# Patient Record
Sex: MALE | Race: WHITE | Employment: OTHER | URBAN - METROPOLITAN AREA
[De-identification: names, ages, dates, MRNs, and addresses within clinical notes are randomized per-mention and may not be internally consistent; named-entity substitution may affect disease eponyms.]

---

## 2019-06-10 ENCOUNTER — HOSPITAL ENCOUNTER (OUTPATIENT)
Dept: LAB | Age: 69
Discharge: HOME | End: 2019-06-10

## 2019-06-10 PROCEDURE — 88305 TISSUE EXAM BY PATHOLOGIST: CPT

## 2020-01-28 ENCOUNTER — HOSPITAL ENCOUNTER (OUTPATIENT)
Dept: LAB | Age: 70
Discharge: HOME | End: 2020-01-28
Payer: MEDICARE

## 2020-01-28 DIAGNOSIS — R94.39 ABNORMAL CARDIOVASCULAR STRESS TEST: ICD-10-CM

## 2020-01-28 PROBLEM — I51.9 LV DYSFUNCTION: Status: ACTIVE | Noted: 2020-01-28

## 2020-01-28 PROBLEM — I44.7 LBBB (LEFT BUNDLE BRANCH BLOCK): Status: ACTIVE | Noted: 2020-01-28

## 2020-01-28 PROBLEM — I10 ESSENTIAL HYPERTENSION, BENIGN: Status: ACTIVE | Noted: 2020-01-28

## 2020-01-28 LAB
ANION GAP SERPL CALC-SCNC: 14 MMOL/L (ref 7–16)
BASOPHILS # BLD: 0.1 K/UL (ref 0–0.2)
BASOPHILS NFR BLD: 1 % (ref 0–2)
BUN SERPL-MCNC: 16 MG/DL (ref 8–23)
CALCIUM SERPL-MCNC: 9.9 MG/DL (ref 8.3–10.4)
CHLORIDE SERPL-SCNC: 106 MMOL/L (ref 98–107)
CO2 SERPL-SCNC: 21 MMOL/L (ref 21–32)
CREAT SERPL-MCNC: 1.37 MG/DL (ref 0.8–1.5)
DIFFERENTIAL METHOD BLD: NORMAL
EOSINOPHIL # BLD: 0.1 K/UL (ref 0–0.8)
EOSINOPHIL NFR BLD: 2 % (ref 0.5–7.8)
ERYTHROCYTE [DISTWIDTH] IN BLOOD BY AUTOMATED COUNT: 12.6 % (ref 11.9–14.6)
GLUCOSE SERPL-MCNC: 157 MG/DL (ref 65–100)
HCT VFR BLD AUTO: 44.8 % (ref 41.1–50.3)
HGB BLD-MCNC: 15.4 G/DL (ref 13.6–17.2)
IMM GRANULOCYTES # BLD AUTO: 0 K/UL (ref 0–0.5)
IMM GRANULOCYTES NFR BLD AUTO: 0 % (ref 0–5)
LYMPHOCYTES # BLD: 2.8 K/UL (ref 0.5–4.6)
LYMPHOCYTES NFR BLD: 41 % (ref 13–44)
MCH RBC QN AUTO: 30.9 PG (ref 26.1–32.9)
MCHC RBC AUTO-ENTMCNC: 34.4 G/DL (ref 31.4–35)
MCV RBC AUTO: 89.8 FL (ref 79.6–97.8)
MONOCYTES # BLD: 0.6 K/UL (ref 0.1–1.3)
MONOCYTES NFR BLD: 9 % (ref 4–12)
NEUTS SEG # BLD: 3.2 K/UL (ref 1.7–8.2)
NEUTS SEG NFR BLD: 47 % (ref 43–78)
NRBC # BLD: 0 K/UL (ref 0–0.2)
PLATELET # BLD AUTO: 212 K/UL (ref 150–450)
PMV BLD AUTO: 9.7 FL (ref 9.4–12.3)
POTASSIUM SERPL-SCNC: 4 MMOL/L (ref 3.5–5.1)
RBC # BLD AUTO: 4.99 M/UL (ref 4.23–5.6)
SODIUM SERPL-SCNC: 141 MMOL/L (ref 136–145)
WBC # BLD AUTO: 6.7 K/UL (ref 4.3–11.1)

## 2020-01-28 PROCEDURE — 36415 COLL VENOUS BLD VENIPUNCTURE: CPT

## 2020-01-28 PROCEDURE — 85025 COMPLETE CBC W/AUTO DIFF WBC: CPT

## 2020-01-28 PROCEDURE — 80048 BASIC METABOLIC PNL TOTAL CA: CPT

## 2020-01-31 ENCOUNTER — HOSPITAL ENCOUNTER (OUTPATIENT)
Dept: CARDIAC CATH/INVASIVE PROCEDURES | Age: 70
Discharge: HOME | End: 2020-01-31
Attending: INTERNAL MEDICINE | Admitting: INTERNAL MEDICINE
Payer: MEDICARE

## 2020-01-31 VITALS
HEART RATE: 75 BPM | BODY MASS INDEX: 32.21 KG/M2 | RESPIRATION RATE: 14 BRPM | HEIGHT: 70 IN | OXYGEN SATURATION: 95 % | DIASTOLIC BLOOD PRESSURE: 78 MMHG | SYSTOLIC BLOOD PRESSURE: 130 MMHG | TEMPERATURE: 98.1 F | WEIGHT: 225 LBS

## 2020-01-31 LAB
ALBUMIN SERPL-MCNC: 3.8 G/DL (ref 3.2–4.6)
ALBUMIN/GLOB SERPL: 1.2 {RATIO} (ref 1.2–3.5)
ALP SERPL-CCNC: 68 U/L (ref 50–136)
ALT SERPL-CCNC: 46 U/L (ref 12–65)
ANION GAP SERPL CALC-SCNC: 6 MMOL/L (ref 7–16)
AST SERPL-CCNC: 27 U/L (ref 15–37)
BILIRUB SERPL-MCNC: 0.8 MG/DL (ref 0.2–1.1)
BUN SERPL-MCNC: 13 MG/DL (ref 8–23)
CALCIUM SERPL-MCNC: 8.9 MG/DL (ref 8.3–10.4)
CHLORIDE SERPL-SCNC: 106 MMOL/L (ref 98–107)
CO2 SERPL-SCNC: 30 MMOL/L (ref 21–32)
CREAT SERPL-MCNC: 1.44 MG/DL (ref 0.8–1.5)
GLOBULIN SER CALC-MCNC: 3.3 G/DL (ref 2.3–3.5)
GLUCOSE SERPL-MCNC: 130 MG/DL (ref 65–100)
POTASSIUM SERPL-SCNC: 3.9 MMOL/L (ref 3.5–5.1)
PROT SERPL-MCNC: 7.1 G/DL (ref 6.3–8.2)
SODIUM SERPL-SCNC: 142 MMOL/L (ref 136–145)

## 2020-01-31 PROCEDURE — 80053 COMPREHEN METABOLIC PANEL: CPT

## 2020-01-31 PROCEDURE — 74011000250 HC RX REV CODE- 250: Performed by: INTERNAL MEDICINE

## 2020-01-31 PROCEDURE — 74011250637 HC RX REV CODE- 250/637: Performed by: INTERNAL MEDICINE

## 2020-01-31 PROCEDURE — C1894 INTRO/SHEATH, NON-LASER: HCPCS

## 2020-01-31 PROCEDURE — C1769 GUIDE WIRE: HCPCS

## 2020-01-31 PROCEDURE — 74011636320 HC RX REV CODE- 636/320: Performed by: INTERNAL MEDICINE

## 2020-01-31 PROCEDURE — 93458 L HRT ARTERY/VENTRICLE ANGIO: CPT

## 2020-01-31 PROCEDURE — 77030004559 HC CATH ANGI DX SUPT CARD -B

## 2020-01-31 PROCEDURE — 74011250636 HC RX REV CODE- 250/636: Performed by: INTERNAL MEDICINE

## 2020-01-31 PROCEDURE — 99152 MOD SED SAME PHYS/QHP 5/>YRS: CPT

## 2020-01-31 PROCEDURE — 77030029997 HC DEV COM RDL R BND TELE -B

## 2020-01-31 RX ORDER — GUAIFENESIN 100 MG/5ML
81-324 LIQUID (ML) ORAL ONCE
Status: COMPLETED | OUTPATIENT
Start: 2020-01-31 | End: 2020-01-31

## 2020-01-31 RX ORDER — MIDAZOLAM HYDROCHLORIDE 1 MG/ML
.5-2 INJECTION, SOLUTION INTRAMUSCULAR; INTRAVENOUS
Status: DISCONTINUED | OUTPATIENT
Start: 2020-01-31 | End: 2020-01-31 | Stop reason: HOSPADM

## 2020-01-31 RX ORDER — HEPARIN SODIUM 200 [USP'U]/100ML
3 INJECTION, SOLUTION INTRAVENOUS CONTINUOUS
Status: DISCONTINUED | OUTPATIENT
Start: 2020-01-31 | End: 2020-01-31 | Stop reason: HOSPADM

## 2020-01-31 RX ORDER — SODIUM CHLORIDE 9 MG/ML
75 INJECTION, SOLUTION INTRAVENOUS CONTINUOUS
Status: DISCONTINUED | OUTPATIENT
Start: 2020-01-31 | End: 2020-01-31 | Stop reason: HOSPADM

## 2020-01-31 RX ORDER — LIDOCAINE HYDROCHLORIDE 10 MG/ML
2-20 INJECTION, SOLUTION EPIDURAL; INFILTRATION; INTRACAUDAL; PERINEURAL ONCE
Status: COMPLETED | OUTPATIENT
Start: 2020-01-31 | End: 2020-01-31

## 2020-01-31 RX ADMIN — ASPIRIN 81 MG 324 MG: 81 TABLET ORAL at 10:02

## 2020-01-31 RX ADMIN — SODIUM CHLORIDE 75 ML/HR: 900 INJECTION, SOLUTION INTRAVENOUS at 10:03

## 2020-01-31 RX ADMIN — MIDAZOLAM 2 MG: 1 INJECTION INTRAMUSCULAR; INTRAVENOUS at 13:25

## 2020-01-31 RX ADMIN — IOPAMIDOL 80 ML: 755 INJECTION, SOLUTION INTRAVENOUS at 13:36

## 2020-01-31 RX ADMIN — HEPARIN SODIUM 3 ML/HR: 5000 INJECTION, SOLUTION INTRAVENOUS; SUBCUTANEOUS at 13:25

## 2020-01-31 RX ADMIN — HEPARIN SODIUM 2.3 ML: 10000 INJECTION, SOLUTION INTRAVENOUS; SUBCUTANEOUS at 13:25

## 2020-01-31 RX ADMIN — LIDOCAINE HYDROCHLORIDE 3 ML: 10 INJECTION, SOLUTION EPIDURAL; INFILTRATION; INTRACAUDAL; PERINEURAL at 13:25

## 2020-01-31 NOTE — PROGRESS NOTES
Patient pre-assessment complete for [facility] poss with Dr Shaina Rodriguez scheduled for 20 at 11am, arrival time 9am. Patient verified using . Patient instructed to bring all home medications in labeled bottles on the day of procedure. NPO status reinforced. Patient informed to take a full dose aspirin 325mg  or 81 mg x 4 on the day of procedure. Instructed they can take all other medications excluding vitamins & supplements. Patient verbalizes understanding of all instructions & denies any questions at this time.

## 2020-01-31 NOTE — PROGRESS NOTES
Pt arrived, ambulated to room with no visible problems, planned C for Dr Elmira Degroot. Consent signed, Procedure discussed with pt all questions answered voiced understanding. Medications and history discussed with pt. Pt prepped per ordersThe patient has a fraility score of 3-MANAGING WELL, based on ability to complete ADLs without assistance.

## 2020-01-31 NOTE — DISCHARGE INSTRUCTIONS
HEART CATHETERIZATION/ANGIOGRAPHY DISCHARGE INSTRUCTIONS    1. Check puncture site frequently for swelling or bleeding. If there is any bleeding, apply pressure over the area with a clean towel or washcloth. If you are unable to stop the bleeding in 15-20 minutes call 911. Notify your doctor for any redness, swelling, drainage, or oozing from the puncture site. Notify your doctor for any fever, chills or other signs of infection. 2. If the extremity becomes cold, numb, or painful call Dr. Salamanca [address] [facility] [address] Cardiology at [phone].  3. Activity should be very limited for the next 24 hours. Avoid pushing, pulling, or strenuous activity for 3 days No heavy lifting (anything over 5 pounds) for 3 days. No driving for 48 hours. 4. You may resume your usual diet. Drink more fluids than usual, water is best.  5. Have a responsible person drive you home and stay with you for at least 24 hours after your heart catheterization/angiography. 6. You may remove bandage from your right wrist in 24 hours. You may shower in 24 hours. No tub baths, hot tubs, or swimming for 1 week. Do not wash dishes for 1 week. Do not place any lotions, creams, powders, or ointments over puncture site for 1 week. You may place a clean band-aid over the puncture site each day for 5 days. Change daily. I have read the above instructions and have had the opportunity to ask questions.

## 2020-01-31 NOTE — PROCEDURES
Brief Cardiac Procedure Note    Patient: Arpit Records MRN: [id number]  SSN: [id number]    YOB: 1950  Age: 77 y.o. Sex: male      Date of Procedure: 1/31/2020     Pre-procedure Diagnosis: Congestive Heart Failure and Coronary Artery Disease    Post-procedure Diagnosis: Coronary Artery Disease    Reason for Procedure: Cardiomyopathy    Procedure: Left Heart Catheterization    Brief Description of Procedure: rra    Performed By: Jaz Domínguez MD     Assistants:     Anesthesia: Moderate Sedation    Estimated Blood Loss: Less than 10 mL      Specimens: None    Implants: None    Findings:   Ef 50  edp 12  Lm ok  + non obst cad lad, lcx and rca    Complications: None    Recommendations: Continue medical therapy.     Signed By: Jaz Domínguez MD     January 31, 2020

## 2020-02-01 NOTE — PROCEDURES
[facility]  CARDIAC CATH    Name:  Gertrudis Trevino  MR#:  [id number]  :  1950  ACCOUNT #:  [de-identified]  DATE OF SERVICE:  2020    PROCEDURES PERFORMED:  Cardiac catheterization. PREOPERATIVE DIAGNOSES:  cardiomyopathy. POSTOPERATIVE DIAGNOSES:  same. SURGEON:  Omar Dia MD    ASSISTANT:  YOLANDA Stearns    ESTIMATED BLOOD LOSS:  Zero. SPECIMENS REMOVED:  Zero. COMPLICATIONS:  Zero. IMPLANTS:  Zero. ANESTHESIA:  Conscious sedation, Versed 2 mg administered by Alexa Sinha RN. The procedure start time was 01:26 and end time was 01:41. HISTORY:  This is a 61-year-old gentleman with multiple risk factors of coronary artery disease. He has a left bundle-branch block. He has a mild left ventricular dysfunction. Cardiac catheterization to exclude coronary disease is recommended. PROCEDURE:  Left heart catheterization, left ventriculography, coronary angiography was carried out from right radial artery by modified Seldinger technique with a 6-Slov[place] multipurpose catheter. He tolerated well. No intervention was performed. FINDINGS:  The central aortic pressure is 140/70 mmHg. Left ventricular end-diastolic pressure is 12 mmHg. There is no gradient on pullback across the aortic valve. The overall left ventricular size is normal.  There is mild generalized left ventricular hypokinesis. Left ventricular ejection fraction on the order of 50%. Coronary angiography reveals the right coronary artery to be normal in its origin. There is mild-to-moderate smooth atherosclerotic disease in the midportion. The greatest stenosis in this region on the order of 40%. The middle and distal portion of the right coronary artery shows some calcification and minor irregularity. The left main is normal.  Divides into LAD and left circumflex. LAD has calcification.   In the midportion of the vessel, there is plaque formation with the greatest stenosis in this region on the order of 40-50%. The further middle and distal LAD segments are normal.  The LAD diagonal is a big vessel with minor disease. The left circumflex is a large artery. It has mild atherosclerotic disease present. IMPRESSION:  1. Mild left ventricular systolic dysfunction. 2.  Nonobstructive coronary artery disease as described. There is mild-to-moderate nonobstructive disease of the left anterior descending, left circumflex, and right coronary arteries. RECOMMENDATIONS:  Risk factor modification and medical therapy.       Philip Rodríguez MD      [id number]  D:  01/31/2020 13:52  T:  02/01/2020 1:41  JOB #:  [id number]

## 2020-02-17 PROBLEM — I25.10 ATHEROSCLEROSIS OF NATIVE CORONARY ARTERY OF NATIVE HEART WITHOUT ANGINA PECTORIS: Status: ACTIVE | Noted: 2020-02-17

## 2021-07-16 ENCOUNTER — TRANSCRIBE ORDER (OUTPATIENT)
Dept: SCHEDULING | Age: 71
End: 2021-07-16

## 2021-07-16 DIAGNOSIS — Z13.6 SCREENING FOR AAA (ABDOMINAL AORTIC ANEURYSM): Primary | ICD-10-CM

## 2021-07-24 ENCOUNTER — HOSPITAL ENCOUNTER (OUTPATIENT)
Dept: ULTRASOUND IMAGING | Age: 71
Discharge: HOME | End: 2021-07-24
Attending: FAMILY MEDICINE
Payer: MEDICARE

## 2021-07-24 DIAGNOSIS — Z13.6 SCREENING FOR AAA (ABDOMINAL AORTIC ANEURYSM): ICD-10-CM

## 2021-07-24 PROCEDURE — 76706 US ABDL AORTA SCREEN AAA: CPT

## 2021-08-11 PROBLEM — I48.92 ATRIAL FLUTTER (HCC): Status: ACTIVE | Noted: 2021-08-11

## 2021-10-07 ENCOUNTER — HOSPITAL ENCOUNTER (OUTPATIENT)
Dept: LAB | Age: 71
Discharge: HOME | End: 2021-10-07
Payer: MEDICARE

## 2021-10-07 DIAGNOSIS — I44.7 LBBB (LEFT BUNDLE BRANCH BLOCK): ICD-10-CM

## 2021-10-07 DIAGNOSIS — I48.3 TYPICAL ATRIAL FLUTTER (HCC): ICD-10-CM

## 2021-10-07 DIAGNOSIS — I10 ESSENTIAL HYPERTENSION, BENIGN: ICD-10-CM

## 2021-10-07 LAB
ANION GAP SERPL CALC-SCNC: 7 MMOL/L (ref 7–16)
BASOPHILS # BLD: 0.1 K/UL (ref 0–0.2)
BASOPHILS NFR BLD: 1 % (ref 0–2)
BUN SERPL-MCNC: 17 MG/DL (ref 8–23)
CALCIUM SERPL-MCNC: 9.6 MG/DL (ref 8.3–10.4)
CHLORIDE SERPL-SCNC: 107 MMOL/L (ref 98–107)
CO2 SERPL-SCNC: 28 MMOL/L (ref 21–32)
CREAT SERPL-MCNC: 1.61 MG/DL (ref 0.8–1.5)
DIFFERENTIAL METHOD BLD: NORMAL
EOSINOPHIL # BLD: 0.1 K/UL (ref 0–0.8)
EOSINOPHIL NFR BLD: 2 % (ref 0.5–7.8)
ERYTHROCYTE [DISTWIDTH] IN BLOOD BY AUTOMATED COUNT: 13.2 % (ref 11.9–14.6)
GLUCOSE SERPL-MCNC: 156 MG/DL (ref 65–100)
HCT VFR BLD AUTO: 43.7 % (ref 41.1–50.3)
HGB BLD-MCNC: 14.4 G/DL (ref 13.6–17.2)
IMM GRANULOCYTES # BLD AUTO: 0 K/UL (ref 0–0.5)
IMM GRANULOCYTES NFR BLD AUTO: 0 % (ref 0–5)
LYMPHOCYTES # BLD: 1.9 K/UL (ref 0.5–4.6)
LYMPHOCYTES NFR BLD: 34 % (ref 13–44)
MAGNESIUM SERPL-MCNC: 2 MG/DL (ref 1.8–2.4)
MCH RBC QN AUTO: 29.1 PG (ref 26.1–32.9)
MCHC RBC AUTO-ENTMCNC: 33 G/DL (ref 31.4–35)
MCV RBC AUTO: 88.3 FL (ref 79.6–97.8)
MONOCYTES # BLD: 0.4 K/UL (ref 0.1–1.3)
MONOCYTES NFR BLD: 7 % (ref 4–12)
NEUTS SEG # BLD: 3.1 K/UL (ref 1.7–8.2)
NEUTS SEG NFR BLD: 56 % (ref 43–78)
NRBC # BLD: 0 K/UL (ref 0–0.2)
PLATELET # BLD AUTO: 245 K/UL (ref 150–450)
PMV BLD AUTO: 9.9 FL (ref 9.4–12.3)
POTASSIUM SERPL-SCNC: 4.1 MMOL/L (ref 3.5–5.1)
RBC # BLD AUTO: 4.95 M/UL (ref 4.23–5.6)
SODIUM SERPL-SCNC: 142 MMOL/L (ref 136–145)
WBC # BLD AUTO: 5.5 K/UL (ref 4.3–11.1)

## 2021-10-07 PROCEDURE — 80048 BASIC METABOLIC PNL TOTAL CA: CPT

## 2021-10-07 PROCEDURE — 83735 ASSAY OF MAGNESIUM: CPT

## 2021-10-07 PROCEDURE — 36415 COLL VENOUS BLD VENIPUNCTURE: CPT

## 2021-10-07 PROCEDURE — 85025 COMPLETE CBC W/AUTO DIFF WBC: CPT

## 2021-10-08 NOTE — PROGRESS NOTES
Patient pre-assessment complete for Atrial flutter ablation with Dr Jeffrey Lopez scheduled for 10/11/21 at 9:30am, arrival time 7:30am. Patient verified using . Patient instructed to bring all home medications in labeled bottles on the day of procedure. NPO status reinforced. Patient instructed to HOLD eliquis starting  & HOLD lisinopril & metformin the day of procedure. Instructed they can take all other medications excluding vitamins & supplements. Patient verbalizes understanding of all instructions & denies any questions at this time.

## 2021-10-10 ENCOUNTER — ANESTHESIA EVENT (OUTPATIENT)
Dept: CARDIAC CATH/INVASIVE PROCEDURES | Age: 71
End: 2021-10-10
Payer: MEDICARE

## 2021-10-11 ENCOUNTER — HOSPITAL ENCOUNTER (OUTPATIENT)
Age: 71
Setting detail: OUTPATIENT SURGERY
Discharge: HOME | End: 2021-10-11
Attending: INTERNAL MEDICINE | Admitting: INTERNAL MEDICINE
Payer: MEDICARE

## 2021-10-11 ENCOUNTER — ANESTHESIA (OUTPATIENT)
Dept: CARDIAC CATH/INVASIVE PROCEDURES | Age: 71
End: 2021-10-11
Payer: MEDICARE

## 2021-10-11 ENCOUNTER — APPOINTMENT (OUTPATIENT)
Dept: CARDIAC CATH/INVASIVE PROCEDURES | Age: 71
End: 2021-10-11
Attending: INTERNAL MEDICINE
Payer: MEDICARE

## 2021-10-11 VITALS
OXYGEN SATURATION: 91 % | HEART RATE: 101 BPM | SYSTOLIC BLOOD PRESSURE: 127 MMHG | BODY MASS INDEX: 31.21 KG/M2 | TEMPERATURE: 98 F | RESPIRATION RATE: 16 BRPM | WEIGHT: 218 LBS | DIASTOLIC BLOOD PRESSURE: 88 MMHG | HEIGHT: 70 IN

## 2021-10-11 DIAGNOSIS — I48.3 TYPICAL ATRIAL FLUTTER (HCC): ICD-10-CM

## 2021-10-11 DIAGNOSIS — I48.19 PERSISTENT ATRIAL FIBRILLATION (HCC): ICD-10-CM

## 2021-10-11 LAB
ALBUMIN SERPL-MCNC: 4 G/DL (ref 3.2–4.6)
ALBUMIN/GLOB SERPL: 1.2 {RATIO} (ref 1.2–3.5)
ALP SERPL-CCNC: 42 U/L (ref 50–136)
ALT SERPL-CCNC: 29 U/L (ref 12–65)
ANION GAP SERPL CALC-SCNC: 5 MMOL/L (ref 7–16)
AST SERPL-CCNC: 19 U/L (ref 15–37)
ATRIAL RATE: 113 BPM
BILIRUB SERPL-MCNC: 0.6 MG/DL (ref 0.2–1.1)
BUN SERPL-MCNC: 14 MG/DL (ref 8–23)
CALCIUM SERPL-MCNC: 9 MG/DL (ref 8.3–10.4)
CALCULATED R AXIS, ECG10: -50 DEGREES
CALCULATED T AXIS, ECG11: 96 DEGREES
CHLORIDE SERPL-SCNC: 108 MMOL/L (ref 98–107)
CO2 SERPL-SCNC: 25 MMOL/L (ref 21–32)
CREAT SERPL-MCNC: 1.46 MG/DL (ref 0.8–1.5)
DIAGNOSIS, 93000: NORMAL
ERYTHROCYTE [DISTWIDTH] IN BLOOD BY AUTOMATED COUNT: 13.2 % (ref 11.9–14.6)
GLOBULIN SER CALC-MCNC: 3.3 G/DL (ref 2.3–3.5)
GLUCOSE SERPL-MCNC: 146 MG/DL (ref 65–100)
HCT VFR BLD AUTO: 43.2 % (ref 41.1–50.3)
HGB BLD-MCNC: 13.9 G/DL (ref 13.6–17.2)
MAGNESIUM SERPL-MCNC: 1.9 MG/DL (ref 1.8–2.4)
MCH RBC QN AUTO: 28.8 PG (ref 26.1–32.9)
MCHC RBC AUTO-ENTMCNC: 32.2 G/DL (ref 31.4–35)
MCV RBC AUTO: 89.6 FL (ref 79.6–97.8)
NRBC # BLD: 0 K/UL (ref 0–0.2)
PLATELET # BLD AUTO: 244 K/UL (ref 150–450)
PMV BLD AUTO: 9.7 FL (ref 9.4–12.3)
POTASSIUM SERPL-SCNC: 3.8 MMOL/L (ref 3.5–5.1)
PROT SERPL-MCNC: 7.3 G/DL (ref 6.3–8.2)
Q-T INTERVAL, ECG07: 324 MS
QRS DURATION, ECG06: 144 MS
QTC CALCULATION (BEZET), ECG08: 463 MS
RBC # BLD AUTO: 4.82 M/UL (ref 4.23–5.6)
SODIUM SERPL-SCNC: 138 MMOL/L (ref 138–145)
VENTRICULAR RATE, ECG03: 123 BPM
WBC # BLD AUTO: 6.2 K/UL (ref 4.3–11.1)

## 2021-10-11 PROCEDURE — 93312 ECHO TRANSESOPHAGEAL: CPT | Performed by: INTERNAL MEDICINE

## 2021-10-11 PROCEDURE — 93613 INTRACARDIAC EPHYS 3D MAPG: CPT | Performed by: INTERNAL MEDICINE

## 2021-10-11 PROCEDURE — 92960 CARDIOVERSION ELECTRIC EXT: CPT | Performed by: INTERNAL MEDICINE

## 2021-10-11 PROCEDURE — 93653 COMPRE EP EVAL TX SVT: CPT | Performed by: INTERNAL MEDICINE

## 2021-10-11 PROCEDURE — 76060000033 HC ANESTHESIA 1 TO 1.5 HR: Performed by: INTERNAL MEDICINE

## 2021-10-11 PROCEDURE — C1760 CLOSURE DEV, VASC: HCPCS | Performed by: INTERNAL MEDICINE

## 2021-10-11 PROCEDURE — 74011250636 HC RX REV CODE- 250/636: Performed by: NURSE ANESTHETIST, CERTIFIED REGISTERED

## 2021-10-11 PROCEDURE — 93325 DOPPLER ECHO COLOR FLOW MAPG: CPT | Performed by: INTERNAL MEDICINE

## 2021-10-11 PROCEDURE — C1730 CATH, EP, 19 OR FEW ELECT: HCPCS | Performed by: INTERNAL MEDICINE

## 2021-10-11 PROCEDURE — 77030027107 HC PTCH EXT REF CARTO3 J&J -F: Performed by: INTERNAL MEDICINE

## 2021-10-11 PROCEDURE — 93621 COMP EP EVL L PAC&REC C SINS: CPT | Performed by: INTERNAL MEDICINE

## 2021-10-11 PROCEDURE — C1732 CATH, EP, DIAG/ABL, 3D/VECT: HCPCS | Performed by: INTERNAL MEDICINE

## 2021-10-11 PROCEDURE — 93320 DOPPLER ECHO COMPLETE: CPT | Performed by: INTERNAL MEDICINE

## 2021-10-11 PROCEDURE — 77030039425 HC BLD LARYNG TRULITE DISP TELE -A: Performed by: NURSE ANESTHETIST, CERTIFIED REGISTERED

## 2021-10-11 PROCEDURE — 80053 COMPREHEN METABOLIC PANEL: CPT

## 2021-10-11 PROCEDURE — 76210000006 HC OR PH I REC 0.5 TO 1 HR: Performed by: INTERNAL MEDICINE

## 2021-10-11 PROCEDURE — 74011000250 HC RX REV CODE- 250: Performed by: NURSE ANESTHETIST, CERTIFIED REGISTERED

## 2021-10-11 PROCEDURE — 93005 ELECTROCARDIOGRAM TRACING: CPT | Performed by: INTERNAL MEDICINE

## 2021-10-11 PROCEDURE — 77030037088 HC TUBE ENDOTRACH ORAL NSL COVD-A: Performed by: NURSE ANESTHETIST, CERTIFIED REGISTERED

## 2021-10-11 PROCEDURE — 83735 ASSAY OF MAGNESIUM: CPT

## 2021-10-11 PROCEDURE — 93325 DOPPLER ECHO COLOR FLOW MAPG: CPT

## 2021-10-11 PROCEDURE — 85027 COMPLETE CBC AUTOMATED: CPT

## 2021-10-11 PROCEDURE — C1894 INTRO/SHEATH, NON-LASER: HCPCS | Performed by: INTERNAL MEDICINE

## 2021-10-11 PROCEDURE — 77030013687 HC GD NDL BARD -B: Performed by: INTERNAL MEDICINE

## 2021-10-11 PROCEDURE — 77030035291 HC TBNG PMP SMARTABLATE J&J -B: Performed by: INTERNAL MEDICINE

## 2021-10-11 RX ORDER — SODIUM CHLORIDE 0.9 % (FLUSH) 0.9 %
5-40 SYRINGE (ML) INJECTION AS NEEDED
Status: DISCONTINUED | OUTPATIENT
Start: 2021-10-11 | End: 2021-10-11 | Stop reason: HOSPADM

## 2021-10-11 RX ORDER — PROPOFOL 10 MG/ML
INJECTION, EMULSION INTRAVENOUS AS NEEDED
Status: DISCONTINUED | OUTPATIENT
Start: 2021-10-11 | End: 2021-10-11 | Stop reason: HOSPADM

## 2021-10-11 RX ORDER — PROPOFOL 10 MG/ML
INJECTION, EMULSION INTRAVENOUS
Status: DISCONTINUED | OUTPATIENT
Start: 2021-10-11 | End: 2021-10-11 | Stop reason: HOSPADM

## 2021-10-11 RX ORDER — LIDOCAINE HYDROCHLORIDE 20 MG/ML
INJECTION, SOLUTION EPIDURAL; INFILTRATION; INTRACAUDAL; PERINEURAL AS NEEDED
Status: DISCONTINUED | OUTPATIENT
Start: 2021-10-11 | End: 2021-10-11 | Stop reason: HOSPADM

## 2021-10-11 RX ORDER — DILTIAZEM HYDROCHLORIDE 120 MG/1
120 CAPSULE, COATED, EXTENDED RELEASE ORAL EVERY 12 HOURS
Qty: 60 CAPSULE | Refills: 3 | Status: SHIPPED | OUTPATIENT
Start: 2021-10-11 | End: 2021-12-08 | Stop reason: SDUPTHER

## 2021-10-11 RX ORDER — NALOXONE HYDROCHLORIDE 0.4 MG/ML
0.1 INJECTION, SOLUTION INTRAMUSCULAR; INTRAVENOUS; SUBCUTANEOUS AS NEEDED
Status: DISCONTINUED | OUTPATIENT
Start: 2021-10-11 | End: 2021-10-11 | Stop reason: HOSPADM

## 2021-10-11 RX ORDER — ROCURONIUM BROMIDE 10 MG/ML
INJECTION, SOLUTION INTRAVENOUS AS NEEDED
Status: DISCONTINUED | OUTPATIENT
Start: 2021-10-11 | End: 2021-10-11 | Stop reason: HOSPADM

## 2021-10-11 RX ORDER — HYDROCODONE BITARTRATE AND ACETAMINOPHEN 7.5; 325 MG/1; MG/1
1 TABLET ORAL AS NEEDED
Status: DISCONTINUED | OUTPATIENT
Start: 2021-10-11 | End: 2021-10-11 | Stop reason: HOSPADM

## 2021-10-11 RX ORDER — SODIUM CHLORIDE, SODIUM LACTATE, POTASSIUM CHLORIDE, CALCIUM CHLORIDE 600; 310; 30; 20 MG/100ML; MG/100ML; MG/100ML; MG/100ML
INJECTION, SOLUTION INTRAVENOUS
Status: DISCONTINUED | OUTPATIENT
Start: 2021-10-11 | End: 2021-10-11 | Stop reason: HOSPADM

## 2021-10-11 RX ORDER — OXYCODONE HYDROCHLORIDE 5 MG/1
5 TABLET ORAL
Status: DISCONTINUED | OUTPATIENT
Start: 2021-10-11 | End: 2021-10-11 | Stop reason: HOSPADM

## 2021-10-11 RX ORDER — SUCCINYLCHOLINE CHLORIDE 20 MG/ML
INJECTION INTRAMUSCULAR; INTRAVENOUS AS NEEDED
Status: DISCONTINUED | OUTPATIENT
Start: 2021-10-11 | End: 2021-10-11 | Stop reason: HOSPADM

## 2021-10-11 RX ORDER — SODIUM CHLORIDE 0.9 % (FLUSH) 0.9 %
5-40 SYRINGE (ML) INJECTION EVERY 8 HOURS
Status: DISCONTINUED | OUTPATIENT
Start: 2021-10-11 | End: 2021-10-11 | Stop reason: HOSPADM

## 2021-10-11 RX ORDER — HYDROMORPHONE HYDROCHLORIDE 2 MG/ML
0.5 INJECTION, SOLUTION INTRAMUSCULAR; INTRAVENOUS; SUBCUTANEOUS
Status: DISCONTINUED | OUTPATIENT
Start: 2021-10-11 | End: 2021-10-11 | Stop reason: HOSPADM

## 2021-10-11 RX ORDER — EPHEDRINE SULFATE/0.9% NACL/PF 50 MG/5 ML
SYRINGE (ML) INTRAVENOUS AS NEEDED
Status: DISCONTINUED | OUTPATIENT
Start: 2021-10-11 | End: 2021-10-11 | Stop reason: HOSPADM

## 2021-10-11 RX ADMIN — Medication 10 MG: at 10:38

## 2021-10-11 RX ADMIN — LIDOCAINE HYDROCHLORIDE 40 MG: 20 INJECTION, SOLUTION EPIDURAL; INFILTRATION; INTRACAUDAL; PERINEURAL at 09:53

## 2021-10-11 RX ADMIN — PHENYLEPHRINE HYDROCHLORIDE 100 MCG: 10 INJECTION INTRAVENOUS at 10:33

## 2021-10-11 RX ADMIN — SUCCINYLCHOLINE CHLORIDE 140 MG: 20 INJECTION, SOLUTION INTRAMUSCULAR; INTRAVENOUS at 10:29

## 2021-10-11 RX ADMIN — ROCURONIUM BROMIDE 30 MG: 10 INJECTION, SOLUTION INTRAVENOUS at 10:34

## 2021-10-11 RX ADMIN — PROPOFOL 30 MG: 10 INJECTION, EMULSION INTRAVENOUS at 09:54

## 2021-10-11 RX ADMIN — PROPOFOL 100 MCG/KG/MIN: 10 INJECTION, EMULSION INTRAVENOUS at 09:57

## 2021-10-11 RX ADMIN — PROPOFOL 30 MG: 10 INJECTION, EMULSION INTRAVENOUS at 10:17

## 2021-10-11 RX ADMIN — SUGAMMADEX 200 MG: 100 INJECTION, SOLUTION INTRAVENOUS at 10:47

## 2021-10-11 RX ADMIN — PROPOFOL 50 MG: 10 INJECTION, EMULSION INTRAVENOUS at 10:29

## 2021-10-11 RX ADMIN — PHENYLEPHRINE HYDROCHLORIDE 200 MCG: 10 INJECTION INTRAVENOUS at 10:37

## 2021-10-11 RX ADMIN — PHENYLEPHRINE HYDROCHLORIDE 100 MCG: 10 INJECTION INTRAVENOUS at 10:19

## 2021-10-11 RX ADMIN — SODIUM CHLORIDE, SODIUM LACTATE, POTASSIUM CHLORIDE, AND CALCIUM CHLORIDE: 600; 310; 30; 20 INJECTION, SOLUTION INTRAVENOUS at 09:46

## 2021-10-11 NOTE — Clinical Note
Bilateral groin clipped prepped with ChloraPrep and draped. Wet prep solution applied at: 952. Wet prep solution dried at: 957. Wet prep elapsed drying time: 5 mins.

## 2021-10-11 NOTE — DISCHARGE INSTRUCTIONS
Ablation Discharge Instructions    *Check the puncture site frequently for swelling or bleeding. If you see any bleeding, lie down and apply pressure over the area with a clean town or washcloth. Notify your doctor for any redness, swelling, drainage or oozing from the puncture site. Notify your doctor for any fever or chills. *If the leg with the puncture becomes cold, numb or painful, call [facility]iology at  [phone]. *Activity should be limited for the next 48 hours. Climb stairs as little as possible and avoid any stooping, bending or strenuous activity for 48 hours. No heavy lifting (anything over 10 pounds) for three days. *Do not drive for 48 hours. *You may resume your usual diet. Drink more fluids than usual.    *Have a responsible person drive you home and stay with you for at least 24 hours after your ablation. *You may remove the bandage from your Right Groin in 24 hours. You may shower in 24 hours. No tub baths, hot tubs or swimming for one week. Do not place any lotions, creams, powders, ointments over the puncture site for one week. You may place a clean band-aid over the puncture site each day for 5 days. Change this daily.

## 2021-10-11 NOTE — ANESTHESIA POSTPROCEDURE EVALUATION
Procedure(s):  ABLATION A-FLUTTER.    total IV anesthesia    Anesthesia Post Evaluation      Multimodal analgesia: multimodal analgesia used between 6 hours prior to anesthesia start to PACU discharge  Patient location during evaluation: PACU  Patient participation: complete - patient participated  Level of consciousness: awake and alert  Pain management: adequate  Airway patency: patent  Anesthetic complications: no  Cardiovascular status: acceptable  Respiratory status: acceptable  Hydration status: acceptable  Post anesthesia nausea and vomiting:  none  Final Post Anesthesia Temperature Assessment:  Normothermia (36.0-37.5 degrees C)      INITIAL Post-op Vital signs:   Vitals Value Taken Time   /73 10/11/21 1146   Temp 36.7 °C (98 °F) 10/11/21 1146   Pulse 105 10/11/21 1146   Resp 16 10/11/21 1146   SpO2 96 % 10/11/21 1146

## 2021-10-11 NOTE — H&P
[facility] Cardiology/Electrophyiology Consult                Date of  Admission: 10/11/2021  7:15 AM     CC/Reason for admission: aflutter ablation     Cyn Hoskins is a 80 y.o. male admitted for Typical atrial flutter ([facility] [address] [I48.3]. 80 y.o. male with a past medical and cardiac history significant for HTN, LBBB, CAD and new diagnosis of atrial flutter admitted for scheduled aflutter ablation. PT was seen recently by Dr. Christina Sanchez and was found incidentally to have rapid HR, atrial flutter. PT has been having some fatigue, no chest pain, palpitations, presyncope or syncope. He was feeling significant SOB, DUNLAP, now improved no meds. Cardiac PMH: (Old records have been reviewed and summarized below)  Cath (1/31/20): IMPRESSION:  1.  Mild left ventricular systolic dysfunction.   2.  Nonobstructive coronary artery disease as described. Xochitl Norris is mild-to-moderate nonobstructive disease of the left anterior descending, left circumflex, and right coronary arteries.     TTE (1/23/20): EF 50%    Patient Active Problem List   Diagnosis Code    Essential hypertension, benign I10    LBBB (left bundle branch block) I44.7    LV dysfunction I51.9    Abnormal cardiovascular stress test R94.39    Atherosclerosis of native coronary artery of native heart without angina pectoris I25.10    Atrial flutter ([facility] [address] I48.92       Past Medical History:   Diagnosis Date    Atherosclerosis of native coronary artery of native heart without angina pectoris 2/17/2020    GERD (gastroesophageal reflux disease)     High cholesterol     Hypertension     LBBB (left bundle branch block) 1/28/2020      Past Surgical History:   Procedure Laterality Date    HX HEENT      left tube placement    HX ORTHOPAEDIC      L5 disc surgery    HX OTHER SURGICAL      skin grafts on right hip and thigh     No Known Allergies   Family History   Problem Relation Age of Onset    Malignant Hyperthermia Neg Hx     Pseudocholinesterase Deficiency Neg Hx  Delayed Awakening Neg Hx     Post-op Nausea/Vomiting Neg Hx     Emergence Delirium Neg Hx     Post-op Cognitive Dysfunction Neg Hx     Other Neg Hx         No current facility-administered medications for this encounter. Review of Symptoms:  A comprehensive ROS was performed with the pertinent positives and negatives mentioned in the HPI, all other systems reviewed and are negative       Physical Exam  Vitals:    10/11/21 0743   BP: (!) 139/93   Pulse: (!) 107   Temp: 98.6 °F (37 °C)   SpO2: 95%   Weight: 218 lb (98.9 kg)   Height: 5' 10\" (1.778 m)       Physical Exam:  Gen: well appearing, well developed, NAD  Eyes: Pupils equal, EOMI  CV: irreg, no M/R/G, normal JVD, normal distal pulses, no JELENA  Pulm: CTAB, no accessory muscle uses, no wheezes, crackles  GI: soft, NT, ND    Cardiographics    Telemetry:   ECG (Indpendently visualized and interpreted):  Echocardiogram:     Labs:   Recent Labs     10/11/21  0740      K 3.8   MG 1.9   BUN 14   CREA 1.46   *   WBC 6.2   HGB 13.9   HCT 43.2           Assessment:      Principal Problem:    Atrial flutter ([address] (8/11/2021)           Plan:   1. Atrial flutter, new diagnosis: I had a discussion with the Pt regarding rate and rhythm control strategies, rhythm control strategy treatment options including DCCV, antiarrhythmic therapy, catheter ablation and the combination of the above. I discussed at length the advantages and disadvantages of all treatment strategies. We discussed the option of cardiac ablation in detail, including discussion of some of the more common, however, very low risks of the procedure including access complications and risks of damage to the heart or surrounding structures. Pt would like to proceed. --LAMONTE followed by aflutter ablation        --cont mariam Varghese MD, MS  Cardiology/Electrophysiology

## 2021-10-11 NOTE — ANESTHESIA PREPROCEDURE EVALUATION
Relevant Problems   No relevant active problems       Anesthetic History               Review of Systems / Medical History  Patient summary reviewed and pertinent labs reviewed    Pulmonary                   Neuro/Psych              Cardiovascular    Hypertension        Dysrhythmias : atrial flutter  CAD and hyperlipidemia    Exercise tolerance: >4 METS  Comments: LBBB    TTE 10/6/2021:  · LA: Moderately dilated left atrium. Left Atrium volume index is 39 mL/m2. · TV: Mild tricuspid valve regurgitation is present. Right Ventricular Arterial Pressure (RVSP) is 37 mmHg. · LV: Estimated LVEF is 45 - 50%. Normal cavity size and wall thickness. Abnormal left ventricular septal motion consistent with left bundle branch block. Moderate (grade 2) left ventricular diastolic dysfunction. · MV: Mild mitral annular calcification. Mild to moderate mitral valve regurgitation is present    NST: 1/2020:  CONCLUSION:   1. Stress EKG: Non-diagnostic due to resting EKG abnormalities. 2. SPECT Perfusion Imaging: Normal Perfusion. 3. LV Systolic Function is moderately abnormal.   4. Risk Assessment: Low Risk Scan.    GI/Hepatic/Renal     GERD           Endo/Other             Other Findings            Physical Exam    Airway  Mallampati: II  TM Distance: > 6 cm  Neck ROM: normal range of motion   Mouth opening: Normal     Cardiovascular  Regular rate and rhythm,  S1 and S2 normal,  no murmur, click, rub, or gallop             Dental  No notable dental hx       Pulmonary  Breath sounds clear to auscultation               Abdominal         Other Findings            Anesthetic Plan    ASA: 3  Anesthesia type: total IV anesthesia            Anesthetic plan and risks discussed with: Patient

## 2021-10-11 NOTE — PROGRESS NOTES
Pt arrived, ambulated to room with no visible problems, planned LAMONTE/Aflutter Ablation for Dr Elizabeth Ortiz. Consent signed, Procedure discussed with pt all questions answered voiced understanding. Medications and history discussed with pt. Pt prepped per ordersThe patient has a fraility score of 3-MANAGING WELL, based on no need for assistance.

## 2021-10-11 NOTE — PERIOP NOTES
TRANSFER - OUT REPORT:    Verbal report given to Cath lab RN on Lashaun Morris  being transferred to Cath Lab for routine progression of care         Report consisted of patients Situation, Background, Assessment and   Recommendations(SBAR). Information from the following report(s)  was reviewed with the receiving nurse. Lines:   Peripheral IV 10/11/21 Right Antecubital (Active)   Site Assessment Clean, dry, & intact 10/11/21 1146   Phlebitis Assessment 0 10/11/21 1146   Infiltration Assessment 0 10/11/21 1146   Dressing Status Clean, dry, & intact 10/11/21 1146   Dressing Type Transparent;Tape 10/11/21 1146   Hub Color/Line Status Patent 10/11/21 1146        Opportunity for questions and clarification was provided. Patient transported with:   Monitor  O2 @ 2 liters  Registered Nurse    VTE prophylaxis orders have been written for Lashaun Morris. Patient and family given floor number and nurses name. Family updated re: pt status after security code verified.

## 2021-10-11 NOTE — PROGRESS NOTES
Patient received from PACU. Patient alert with no complaints.  Right groin site intact with no bleeding or hematoma

## 2021-10-11 NOTE — PROGRESS NOTES
Patient up to bedside, vital signs and site stable. Patient ambulated to bathroom without difficulty.  Right groin site intact with no bleeding or hematoma

## 2022-03-09 ENCOUNTER — HOSPITAL ENCOUNTER (OUTPATIENT)
Dept: LAB | Age: 72
Discharge: HOME | End: 2022-03-09
Payer: MEDICARE

## 2022-03-09 DIAGNOSIS — I48.3 TYPICAL ATRIAL FLUTTER (HCC): ICD-10-CM

## 2022-03-09 LAB
ANION GAP SERPL CALC-SCNC: 1 MMOL/L (ref 7–16)
BUN SERPL-MCNC: 19 MG/DL (ref 8–23)
CALCIUM SERPL-MCNC: 9.5 MG/DL (ref 8.3–10.4)
CHLORIDE SERPL-SCNC: 106 MMOL/L (ref 98–107)
CO2 SERPL-SCNC: 33 MMOL/L (ref 21–32)
CREAT SERPL-MCNC: 1.5 MG/DL (ref 0.8–1.5)
ERYTHROCYTE [DISTWIDTH] IN BLOOD BY AUTOMATED COUNT: 13.2 % (ref 11.9–14.6)
GLUCOSE SERPL-MCNC: 162 MG/DL (ref 65–100)
HCT VFR BLD AUTO: 44.6 % (ref 41.1–50.3)
HGB BLD-MCNC: 14.7 G/DL (ref 13.6–17.2)
MCH RBC QN AUTO: 29.3 PG (ref 26.1–32.9)
MCHC RBC AUTO-ENTMCNC: 33 G/DL (ref 31.4–35)
MCV RBC AUTO: 89 FL (ref 79.6–97.8)
NRBC # BLD: 0 K/UL (ref 0–0.2)
PLATELET # BLD AUTO: 245 K/UL (ref 150–450)
PMV BLD AUTO: 9.5 FL (ref 9.4–12.3)
POTASSIUM SERPL-SCNC: 4.4 MMOL/L (ref 3.5–5.1)
RBC # BLD AUTO: 5.01 M/UL (ref 4.23–5.6)
SODIUM SERPL-SCNC: 140 MMOL/L (ref 138–145)
WBC # BLD AUTO: 6.2 K/UL (ref 4.3–11.1)

## 2022-03-09 PROCEDURE — 85027 COMPLETE CBC AUTOMATED: CPT

## 2022-03-09 PROCEDURE — 80048 BASIC METABOLIC PNL TOTAL CA: CPT

## 2022-03-09 PROCEDURE — 36415 COLL VENOUS BLD VENIPUNCTURE: CPT

## 2022-03-11 NOTE — PROGRESS NOTES
Patient pre-assessment complete for Cardioversion scheduled for 3/14/22, arrival time 0700. Patient verified using . Patient instructed to bring all home medications in labeled bottles on the day of procedure. NPO status reinforced. Patient instructed to HOLD metformin until after the procedure. Instructed they can take all other medications excluding vitamins & supplements. Patient verbalizes understanding of all instructions & denies any questions at this time.

## 2022-03-14 ENCOUNTER — HOSPITAL ENCOUNTER (OUTPATIENT)
Dept: CARDIAC CATH/INVASIVE PROCEDURES | Age: 72
Discharge: HOME | End: 2022-03-14
Attending: INTERNAL MEDICINE | Admitting: INTERNAL MEDICINE
Payer: MEDICARE

## 2022-03-14 VITALS
SYSTOLIC BLOOD PRESSURE: 92 MMHG | TEMPERATURE: 98 F | HEART RATE: 61 BPM | OXYGEN SATURATION: 95 % | DIASTOLIC BLOOD PRESSURE: 66 MMHG

## 2022-03-14 DIAGNOSIS — I48.91 ATRIAL FIBRILLATION, UNSPECIFIED TYPE (HCC): ICD-10-CM

## 2022-03-14 LAB
ATRIAL RATE: 110 BPM
ATRIAL RATE: 67 BPM
CALCULATED P AXIS, ECG09: 45 DEGREES
CALCULATED R AXIS, ECG10: -5 DEGREES
CALCULATED R AXIS, ECG10: -59 DEGREES
CALCULATED T AXIS, ECG11: 101 DEGREES
CALCULATED T AXIS, ECG11: 120 DEGREES
DIAGNOSIS, 93000: NORMAL
DIAGNOSIS, 93000: NORMAL
P-R INTERVAL, ECG05: 204 MS
Q-T INTERVAL, ECG07: 422 MS
Q-T INTERVAL, ECG07: 472 MS
QRS DURATION, ECG06: 150 MS
QRS DURATION, ECG06: 154 MS
QTC CALCULATION (BEZET), ECG08: 498 MS
QTC CALCULATION (BEZET), ECG08: 527 MS
VENTRICULAR RATE, ECG03: 67 BPM
VENTRICULAR RATE, ECG03: 94 BPM

## 2022-03-14 PROCEDURE — 99152 MOD SED SAME PHYS/QHP 5/>YRS: CPT

## 2022-03-14 PROCEDURE — 92960 CARDIOVERSION ELECTRIC EXT: CPT

## 2022-03-14 PROCEDURE — 92960 CARDIOVERSION ELECTRIC EXT: CPT | Performed by: INTERNAL MEDICINE

## 2022-03-14 PROCEDURE — 74011250636 HC RX REV CODE- 250/636: Performed by: INTERNAL MEDICINE

## 2022-03-14 PROCEDURE — 99152 MOD SED SAME PHYS/QHP 5/>YRS: CPT | Performed by: INTERNAL MEDICINE

## 2022-03-14 PROCEDURE — 93005 ELECTROCARDIOGRAM TRACING: CPT | Performed by: INTERNAL MEDICINE

## 2022-03-14 RX ORDER — FENTANYL CITRATE 50 UG/ML
25-50 INJECTION, SOLUTION INTRAMUSCULAR; INTRAVENOUS
Status: DISCONTINUED | OUTPATIENT
Start: 2022-03-14 | End: 2022-03-14 | Stop reason: HOSPADM

## 2022-03-14 RX ORDER — MIDAZOLAM HYDROCHLORIDE 1 MG/ML
1-2 INJECTION, SOLUTION INTRAMUSCULAR; INTRAVENOUS
Status: DISCONTINUED | OUTPATIENT
Start: 2022-03-14 | End: 2022-03-14 | Stop reason: HOSPADM

## 2022-03-14 RX ORDER — SODIUM CHLORIDE 9 MG/ML
75 INJECTION, SOLUTION INTRAVENOUS CONTINUOUS
Status: DISCONTINUED | OUTPATIENT
Start: 2022-03-14 | End: 2022-03-14 | Stop reason: HOSPADM

## 2022-03-14 RX ADMIN — MIDAZOLAM 1 MG: 1 INJECTION INTRAMUSCULAR; INTRAVENOUS at 07:52

## 2022-03-14 RX ADMIN — FENTANYL CITRATE 50 MCG: 50 INJECTION, SOLUTION INTRAMUSCULAR; INTRAVENOUS at 07:45

## 2022-03-14 RX ADMIN — FENTANYL CITRATE 25 MCG: 50 INJECTION, SOLUTION INTRAMUSCULAR; INTRAVENOUS at 07:48

## 2022-03-14 RX ADMIN — MIDAZOLAM 2 MG: 1 INJECTION INTRAMUSCULAR; INTRAVENOUS at 07:45

## 2022-03-14 RX ADMIN — MIDAZOLAM 1 MG: 1 INJECTION INTRAMUSCULAR; INTRAVENOUS at 07:48

## 2022-03-14 NOTE — PROGRESS NOTES
Cardioversion complete with Dr. Deedee Gale, Physician reassessed pt prior to sedation. 4 of Versed and 75 of Fentanyl was given. 1 shock @ 360j and conversion to NSR at 69 bpm. Pt tolerated procedure.     Sedation start 0742  Sedation end 0753 DISPLAY PLAN FREE TEXT

## 2022-03-14 NOTE — PROGRESS NOTES
Patient received to [facility] room # 15  Ambulatory from [facility]. Patient scheduled for CVN today with Dr Ankit Ray. Procedure reviewed & questions answered, voiced good understanding consent obtained & placed on chart. All medications and medical history reviewed. Will prep patient per orders. Patient & family updated on plan of care. The patient has a fraility score of 3-MANAGING WELL, based on ambulation.

## 2022-03-14 NOTE — PROGRESS NOTES
Direct Current Cardioversion Note  - Indication : atrial fibrillation   - synchronized DC Cardioversion 360 J x 1   - Results: sinus rhythm with PACs   - 12 lead EKG pending at this time  - start 0742  - stop 0752  - sedation: 4 mg IV Midazolam, 75 mcg Fentanyl IV given by Bonner Sever, RN under my direct supervision. Direct monitoring of vital signs and respiratory status throughout the procedure. - An independent trained observer pushed medications at my direction. We monitored the patient's level of consciousness and vital signs/physiologic status throughout the procedure duration (see start and stop times above). - No complications, pt in stable condition  - LAMONTE not indicated as the patient has had at least 30 days with uninterrupted anticoagulation with Eliquis 5 mg PO BID , confirmed with patient prior to procedure. - No driving or heavy machine operation today.    - Results and medication changes discussed with patient and family at bedside   - stable and appropriate for discharge home today

## 2022-03-14 NOTE — DISCHARGE INSTRUCTIONS
Sedation for a Medical Procedure: Care Instructions     You were given a sedative medication during your visit. While many of the effects will have worn   off before you leave; you may continue to feel some effects for several hours. Common side effects from sedation include:  · Feeling sleepy. (Your doctors and nurses will make sure you are not too sleepy to go home.)  · Nausea and vomiting. This usually does not last long. · Feeling tired. How can you care for yourself at home? Activity    · Don't do anything for 24 hours that requires attention to detail. It takes time for the medicine effects to completely wear off. · Do not make important legal decisions for 24 hours. · Do not sign any legal documents for 24 hours. · Do not drink alcohol today     · For your safety, you should not drive or operate heavy machinery for the remainder of the day     · Rest when you feel tired. Getting enough sleep will help you recover. Diet    · You can eat your normal diet, unless your doctor gives you other instructions. If your stomach is upset, try clear liquids and bland, low-fat foods like plain toast or rice. · Drink plenty of fluids (unless your doctor tells you not to). · Don't drink alcohol for 24 hours. Medicines    · Be safe with medicines. Read and follow all instructions on the label. · If the doctor gave you a prescription medicine for pain, take it as prescribed. · If you are not taking a prescription pain medicine, ask your doctor if you can take an over-the-counter medicine. · If you think your pain medicine is making you sick to your stomach:  · Take your medicine after meals (unless your doctor has told you not to). · Ask your doctor for a different pain medicine.      Patient Education        Electrical Cardioversion: What to Expect at Home  Your Recovery     Electrical cardioversion is a treatment for an abnormal heartbeat, such as atrial fibrillation, supraventricular tachycardia, or ventricular tachycardia (VT). Your doctor used a brief electrical shock to reset your heart's rhythm. After the procedure, you may have redness, like a sunburn, where the patches were. The medicines you got to make you sleepy may make you feel drowsy for the rest of the day. Your doctor may have you take medicines to help the heart beat normally and to prevent blood clots. This care sheet gives you a general idea about how long it will take for you to recover. But each person recovers at a different pace. Follow the steps below to feel better as quickly as possible. How can you care for yourself at home? Medicines    · Be safe with medicines. Take your medicines exactly as prescribed. Call your doctor if you think you are having a problem with your medicine. You may take one or more of the following medicines:  ? Rate-control medicines to slow the heart rate. These include beta-blockers, calcium channel blockers, and digoxin. ? Rhythm control medicines that help the heart keep a normal rhythm. ? Blood thinners, also called anticoagulants, which help prevent blood clots. You will get more details on the specific medicines your doctor prescribes. Be sure you know how to take your medicines safely.     · Do not take any vitamins, over-the-counter medicines, or herbal products without talking to your doctor first.   Exercise    · Start light exercise if your doctor says that it's okay. Even a small amount will help you get stronger, have more energy, and manage your stress. Walking is an easy way to get exercise. Start out by walking a little more than you did in the hospital. Bit by bit, increase the amount you walk.     · When you exercise, watch for signs that your heart is working too hard. You are pushing too hard if you cannot talk while you are exercising.  If you become short of breath or dizzy or have chest pain, sit down and rest right away.     · Check your pulse regularly. Place two fingers on the artery at the palm side of your wrist in line with your thumb. If your heartbeat seems uneven or fast, talk to your doctor. Other instructions    · Ask your doctor when you can drive again.     · Do not smoke. If you need help quitting, talk to your doctor about stop-smoking programs and medicines. These can increase your chances of quitting for good.     · Limit alcohol. Follow-up care is a key part of your treatment and safety. Be sure to make and go to all appointments, and call your doctor if you are having problems. It's also a good idea to know your test results and keep a list of the medicines you take. When should you call for help? Call 911 anytime you think you may need emergency care. For example, call if:    · You passed out (lost consciousness).     · You have chest pain or pressure. This may occur with:  ? Sweating. ? Shortness of breath. ? Nausea or vomiting. ? Pain that spreads from the chest to the neck, jaw, or one or both shoulders or arms. ? A fast or uneven pulse. After calling 911, the  may tell you to chew 1 adult-strength or 2 to 4 low-dose aspirin. Wait for an ambulance. Do not try to drive yourself.     · You have symptoms of a stroke. These may include:  ? Sudden numbness, tingling, weakness, or loss of movement in your face, arm, or leg, especially on only one side of your body. ? Sudden vision changes. ? Sudden trouble speaking. ? Sudden confusion or trouble understanding simple statements. ? Sudden problems with walking or balance. ? A sudden, severe headache that is different from past headaches. Call your doctor now or seek immediate medical care if:    · You feel dizzy or lightheaded, or you feel like you may faint.     · You have a fast or irregular heartbeat. Watch closely for any changes in your health, and be sure to contact your doctor if you have any problems. Where can you learn more?   Go to http://www.gray.com/  Enter A617 in the search box to learn more about \"Electrical Cardioversion: What to Expect at Home. \"  Current as of: January 10, 2022               Content Version: 13.2  © 4581-6318 [employer]. Care instructions adapted under license by [employer] (which disclaims liability or warranty for this information). If you have questions about a medical condition or this instruction, always ask your healthcare professional. [address] any warranty or liability for your use of this information.          HOLD CARDIM UNTIL FOLLOW UP WITH DR. Eliseo Martinez

## 2022-03-14 NOTE — PROGRESS NOTES
Patient received to [address] [place] [address] room # 15. Ambulatory from [facility]. Patient scheduled for cardioversion today with Dr Coleen Olguin. Procedure reviewed & questions answered, voiced good understanding consent obtained & placed on chart. All medications and medical history reviewed. Will prep patient per orders. Patient & family updated on plan of care.

## 2022-03-14 NOTE — PROGRESS NOTES
Discharge instructions given per orders, voiced good understanding of  care, medications & follow up care.  Denies any questions

## 2022-03-18 PROBLEM — I10 ESSENTIAL HYPERTENSION, BENIGN: Status: ACTIVE | Noted: 2020-01-28

## 2022-03-19 PROBLEM — I48.92 ATRIAL FLUTTER (HCC): Status: ACTIVE | Noted: 2021-08-11

## 2022-03-19 PROBLEM — R94.39 ABNORMAL CARDIOVASCULAR STRESS TEST: Status: ACTIVE | Noted: 2020-01-28

## 2022-03-19 PROBLEM — I25.10 ATHEROSCLEROSIS OF NATIVE CORONARY ARTERY OF NATIVE HEART WITHOUT ANGINA PECTORIS: Status: ACTIVE | Noted: 2020-02-17

## 2022-03-19 PROBLEM — I51.9 LV DYSFUNCTION: Status: ACTIVE | Noted: 2020-01-28

## 2022-03-20 PROBLEM — I44.7 LBBB (LEFT BUNDLE BRANCH BLOCK): Status: ACTIVE | Noted: 2020-01-28

## 2022-03-31 PROBLEM — I48.91 ATRIAL FIBRILLATION (HCC): Status: ACTIVE | Noted: 2022-03-31

## 2022-05-12 ENCOUNTER — HOSPITAL ENCOUNTER (OUTPATIENT)
Dept: LAB | Age: 72
Discharge: HOME | End: 2022-05-12
Payer: MEDICARE

## 2022-05-12 DIAGNOSIS — I48.19 PERSISTENT ATRIAL FIBRILLATION (HCC): ICD-10-CM

## 2022-05-12 PROBLEM — N18.30 CHRONIC RENAL DISEASE, STAGE III (HCC): Status: ACTIVE | Noted: 2022-05-12

## 2022-05-12 LAB
ANION GAP SERPL CALC-SCNC: 5 MMOL/L (ref 7–16)
BASOPHILS # BLD: 0.1 K/UL (ref 0–0.2)
BASOPHILS NFR BLD: 1 % (ref 0–2)
BUN SERPL-MCNC: 21 MG/DL (ref 8–23)
CALCIUM SERPL-MCNC: 9.6 MG/DL (ref 8.3–10.4)
CHLORIDE SERPL-SCNC: 106 MMOL/L (ref 98–107)
CO2 SERPL-SCNC: 29 MMOL/L (ref 21–32)
CREAT SERPL-MCNC: 1.7 MG/DL (ref 0.8–1.5)
DIFFERENTIAL METHOD BLD: NORMAL
EOSINOPHIL # BLD: 0.1 K/UL (ref 0–0.8)
EOSINOPHIL NFR BLD: 2 % (ref 0.5–7.8)
ERYTHROCYTE [DISTWIDTH] IN BLOOD BY AUTOMATED COUNT: 13.5 % (ref 11.9–14.6)
GLUCOSE SERPL-MCNC: 163 MG/DL (ref 65–100)
HCT VFR BLD AUTO: 44.9 % (ref 41.1–50.3)
HGB BLD-MCNC: 14.7 G/DL (ref 13.6–17.2)
IMM GRANULOCYTES # BLD AUTO: 0 K/UL (ref 0–0.5)
IMM GRANULOCYTES NFR BLD AUTO: 0 % (ref 0–5)
INR PPP: 1.1
LYMPHOCYTES # BLD: 2.3 K/UL (ref 0.5–4.6)
LYMPHOCYTES NFR BLD: 37 % (ref 13–44)
MCH RBC QN AUTO: 29.2 PG (ref 26.1–32.9)
MCHC RBC AUTO-ENTMCNC: 32.7 G/DL (ref 31.4–35)
MCV RBC AUTO: 89.1 FL (ref 79.6–97.8)
MONOCYTES # BLD: 0.5 K/UL (ref 0.1–1.3)
MONOCYTES NFR BLD: 7 % (ref 4–12)
NEUTS SEG # BLD: 3.4 K/UL (ref 1.7–8.2)
NEUTS SEG NFR BLD: 53 % (ref 43–78)
NRBC # BLD: 0 K/UL (ref 0–0.2)
PLATELET # BLD AUTO: 273 K/UL (ref 150–450)
PMV BLD AUTO: 9.6 FL (ref 9.4–12.3)
POTASSIUM SERPL-SCNC: 5.1 MMOL/L (ref 3.5–5.1)
PROTHROMBIN TIME: 14.3 SEC (ref 12.6–14.5)
RBC # BLD AUTO: 5.04 M/UL (ref 4.23–5.6)
SODIUM SERPL-SCNC: 140 MMOL/L (ref 136–145)
WBC # BLD AUTO: 6.3 K/UL (ref 4.3–11.1)

## 2022-05-12 PROCEDURE — 80048 BASIC METABOLIC PNL TOTAL CA: CPT

## 2022-05-12 PROCEDURE — 85610 PROTHROMBIN TIME: CPT

## 2022-05-12 PROCEDURE — 36415 COLL VENOUS BLD VENIPUNCTURE: CPT

## 2022-05-12 PROCEDURE — 85025 COMPLETE CBC W/AUTO DIFF WBC: CPT

## 2022-05-16 ENCOUNTER — ANESTHESIA EVENT (OUTPATIENT)
Dept: CARDIAC CATH/INVASIVE PROCEDURES | Age: 72
End: 2022-05-16
Payer: MEDICARE

## 2022-05-16 RX ORDER — ACETAMINOPHEN 500 MG
1000 TABLET ORAL ONCE
Status: CANCELLED | OUTPATIENT
Start: 2022-05-16 | End: 2022-05-16

## 2022-05-16 RX ORDER — LIDOCAINE HYDROCHLORIDE 10 MG/ML
0.1 INJECTION INFILTRATION; PERINEURAL AS NEEDED
Status: CANCELLED | OUTPATIENT
Start: 2022-05-16

## 2022-05-16 RX ORDER — MIDAZOLAM HYDROCHLORIDE 1 MG/ML
2 INJECTION, SOLUTION INTRAMUSCULAR; INTRAVENOUS
Status: CANCELLED | OUTPATIENT
Start: 2022-05-16 | End: 2022-05-17

## 2022-05-16 RX ORDER — SODIUM CHLORIDE, SODIUM LACTATE, POTASSIUM CHLORIDE, CALCIUM CHLORIDE 600; 310; 30; 20 MG/100ML; MG/100ML; MG/100ML; MG/100ML
1000 INJECTION, SOLUTION INTRAVENOUS CONTINUOUS
Status: CANCELLED | OUTPATIENT
Start: 2022-05-16 | End: 2022-05-17

## 2022-05-16 NOTE — PROGRESS NOTES
Called to pre-assess for Afib ablation with Dr Bee Dash , Scheduled 5/17/22. No answer & message left.

## 2022-05-17 ENCOUNTER — ANESTHESIA (OUTPATIENT)
Dept: CARDIAC CATH/INVASIVE PROCEDURES | Age: 72
End: 2022-05-17
Payer: MEDICARE

## 2022-05-17 ENCOUNTER — HOSPITAL ENCOUNTER (OUTPATIENT)
Dept: CARDIAC CATH/INVASIVE PROCEDURES | Age: 72
Setting detail: OUTPATIENT SURGERY
Discharge: HOME | End: 2022-05-17
Attending: INTERNAL MEDICINE
Payer: MEDICARE

## 2022-05-17 ENCOUNTER — HOSPITAL ENCOUNTER (OUTPATIENT)
Age: 72
Setting detail: OUTPATIENT SURGERY
Discharge: HOME | End: 2022-05-17
Attending: INTERNAL MEDICINE | Admitting: INTERNAL MEDICINE
Payer: MEDICARE

## 2022-05-17 VITALS — WEIGHT: 226 LBS | BODY MASS INDEX: 33.47 KG/M2 | HEIGHT: 69 IN

## 2022-05-17 VITALS
BODY MASS INDEX: 32.35 KG/M2 | TEMPERATURE: 97.4 F | OXYGEN SATURATION: 96 % | RESPIRATION RATE: 16 BRPM | HEIGHT: 70 IN | SYSTOLIC BLOOD PRESSURE: 119 MMHG | HEART RATE: 90 BPM | DIASTOLIC BLOOD PRESSURE: 82 MMHG | WEIGHT: 226 LBS

## 2022-05-17 DIAGNOSIS — I48.91 ATRIAL FIBRILLATION, UNSPECIFIED TYPE (HCC): ICD-10-CM

## 2022-05-17 DIAGNOSIS — I48.19 PERSISTENT ATRIAL FIBRILLATION (HCC): ICD-10-CM

## 2022-05-17 LAB
ACT BLD: 285 SECS (ref 70–128)
ACT BLD: 315 SECS (ref 70–128)
ANION GAP SERPL CALC-SCNC: 3 MMOL/L (ref 7–16)
ATRIAL RATE: 129 BPM
BUN SERPL-MCNC: 21 MG/DL (ref 8–23)
CALCIUM SERPL-MCNC: 9.1 MG/DL (ref 8.3–10.4)
CALCULATED R AXIS, ECG10: 11 DEGREES
CALCULATED T AXIS, ECG11: 149 DEGREES
CHLORIDE SERPL-SCNC: 107 MMOL/L (ref 98–107)
CO2 SERPL-SCNC: 30 MMOL/L (ref 21–32)
CREAT SERPL-MCNC: 1.7 MG/DL (ref 0.8–1.5)
DIAGNOSIS, 93000: NORMAL
GLUCOSE SERPL-MCNC: 178 MG/DL (ref 65–100)
MAGNESIUM SERPL-MCNC: 2.2 MG/DL (ref 1.8–2.4)
POTASSIUM SERPL-SCNC: 3.9 MMOL/L (ref 3.5–5.1)
Q-T INTERVAL, ECG07: 398 MS
QRS DURATION, ECG06: 152 MS
QTC CALCULATION (BEZET), ECG08: 541 MS
SODIUM SERPL-SCNC: 140 MMOL/L (ref 136–145)
VENTRICULAR RATE, ECG03: 111 BPM

## 2022-05-17 PROCEDURE — 77030027107 HC PTCH EXT REF CARTO3 J&J -F: Performed by: INTERNAL MEDICINE

## 2022-05-17 PROCEDURE — 74011250636 HC RX REV CODE- 250/636: Performed by: INTERNAL MEDICINE

## 2022-05-17 PROCEDURE — C1894 INTRO/SHEATH, NON-LASER: HCPCS | Performed by: INTERNAL MEDICINE

## 2022-05-17 PROCEDURE — C1893 INTRO/SHEATH, FIXED,NON-PEEL: HCPCS | Performed by: INTERNAL MEDICINE

## 2022-05-17 PROCEDURE — 92960 CARDIOVERSION ELECTRIC EXT: CPT | Performed by: INTERNAL MEDICINE

## 2022-05-17 PROCEDURE — C1759 CATH, INTRA ECHOCARDIOGRAPHY: HCPCS | Performed by: INTERNAL MEDICINE

## 2022-05-17 PROCEDURE — 77030019908 HC STETH ESOPH SIMS -A: Performed by: ANESTHESIOLOGY

## 2022-05-17 PROCEDURE — 93657 TX L/R ATRIAL FIB ADDL: CPT | Performed by: INTERNAL MEDICINE

## 2022-05-17 PROCEDURE — 93325 DOPPLER ECHO COLOR FLOW MAPG: CPT | Performed by: INTERNAL MEDICINE

## 2022-05-17 PROCEDURE — 77030037088 HC TUBE ENDOTRACH ORAL NSL COVD-A: Performed by: NURSE ANESTHETIST, CERTIFIED REGISTERED

## 2022-05-17 PROCEDURE — 77030035291 HC TBNG PMP SMARTABLATE J&J -B: Performed by: INTERNAL MEDICINE

## 2022-05-17 PROCEDURE — 93655 ICAR CATH ABLTJ DSCRT ARRHYT: CPT | Performed by: INTERNAL MEDICINE

## 2022-05-17 PROCEDURE — C1730 CATH, EP, 19 OR FEW ELECT: HCPCS | Performed by: INTERNAL MEDICINE

## 2022-05-17 PROCEDURE — C1733 CATH, EP, OTHR THAN COOL-TIP: HCPCS | Performed by: INTERNAL MEDICINE

## 2022-05-17 PROCEDURE — 93623 PRGRMD STIMJ&PACG IV RX NFS: CPT | Performed by: INTERNAL MEDICINE

## 2022-05-17 PROCEDURE — 93662 INTRACARDIAC ECG (ICE): CPT | Performed by: INTERNAL MEDICINE

## 2022-05-17 PROCEDURE — C1732 CATH, EP, DIAG/ABL, 3D/VECT: HCPCS | Performed by: INTERNAL MEDICINE

## 2022-05-17 PROCEDURE — 85347 COAGULATION TIME ACTIVATED: CPT

## 2022-05-17 PROCEDURE — 77030013687 HC GD NDL BARD -B: Performed by: INTERNAL MEDICINE

## 2022-05-17 PROCEDURE — 93656 COMPRE EP EVAL ABLTJ ATR FIB: CPT | Performed by: INTERNAL MEDICINE

## 2022-05-17 PROCEDURE — 93005 ELECTROCARDIOGRAM TRACING: CPT

## 2022-05-17 PROCEDURE — 76060000037 HC ANESTHESIA 3 TO 3.5 HR: Performed by: INTERNAL MEDICINE

## 2022-05-17 PROCEDURE — 83735 ASSAY OF MAGNESIUM: CPT

## 2022-05-17 PROCEDURE — 77030013797 HC KT TRNSDUC PRSSR EDWD -A: Performed by: INTERNAL MEDICINE

## 2022-05-17 PROCEDURE — 93613 INTRACARDIAC EPHYS 3D MAPG: CPT | Performed by: INTERNAL MEDICINE

## 2022-05-17 PROCEDURE — 93320 DOPPLER ECHO COMPLETE: CPT | Performed by: INTERNAL MEDICINE

## 2022-05-17 PROCEDURE — 93312 ECHO TRANSESOPHAGEAL: CPT | Performed by: INTERNAL MEDICINE

## 2022-05-17 PROCEDURE — 74011250636 HC RX REV CODE- 250/636: Performed by: NURSE ANESTHETIST, CERTIFIED REGISTERED

## 2022-05-17 PROCEDURE — C1760 CLOSURE DEV, VASC: HCPCS | Performed by: INTERNAL MEDICINE

## 2022-05-17 PROCEDURE — 74011000250 HC RX REV CODE- 250: Performed by: NURSE ANESTHETIST, CERTIFIED REGISTERED

## 2022-05-17 PROCEDURE — 77030020506 HC NDL TRNSPTL NRG BAYL -F: Performed by: INTERNAL MEDICINE

## 2022-05-17 PROCEDURE — 93622 COMP EP EVAL L VENTR PAC&REC: CPT | Performed by: INTERNAL MEDICINE

## 2022-05-17 PROCEDURE — 77030039425 HC BLD LARYNG TRULITE DISP TELE -A: Performed by: NURSE ANESTHETIST, CERTIFIED REGISTERED

## 2022-05-17 PROCEDURE — 80048 BASIC METABOLIC PNL TOTAL CA: CPT

## 2022-05-17 PROCEDURE — 93325 DOPPLER ECHO COLOR FLOW MAPG: CPT

## 2022-05-17 PROCEDURE — 76210000006 HC OR PH I REC 0.5 TO 1 HR: Performed by: INTERNAL MEDICINE

## 2022-05-17 RX ORDER — HEPARIN SODIUM 1000 [USP'U]/ML
INJECTION, SOLUTION INTRAVENOUS; SUBCUTANEOUS AS NEEDED
Status: DISCONTINUED | OUTPATIENT
Start: 2022-05-17 | End: 2022-05-17 | Stop reason: HOSPADM

## 2022-05-17 RX ORDER — PANTOPRAZOLE SODIUM 40 MG/1
40 TABLET, DELAYED RELEASE ORAL 2 TIMES DAILY
Qty: 60 TABLET | Refills: 0 | Status: SHIPPED | OUTPATIENT
Start: 2022-05-17 | End: 2022-06-16

## 2022-05-17 RX ORDER — SUCRALFATE 1 G/1
1 TABLET ORAL 4 TIMES DAILY
Qty: 120 TABLET | Refills: 0 | Status: SHIPPED | OUTPATIENT
Start: 2022-05-17 | End: 2022-06-16

## 2022-05-17 RX ORDER — FENTANYL CITRATE 50 UG/ML
INJECTION, SOLUTION INTRAMUSCULAR; INTRAVENOUS AS NEEDED
Status: DISCONTINUED | OUTPATIENT
Start: 2022-05-17 | End: 2022-05-17 | Stop reason: HOSPADM

## 2022-05-17 RX ORDER — ADENOSINE 3 MG/ML
INJECTION, SOLUTION INTRAVENOUS AS NEEDED
Status: DISCONTINUED | OUTPATIENT
Start: 2022-05-17 | End: 2022-05-17 | Stop reason: HOSPADM

## 2022-05-17 RX ORDER — COLCHICINE 0.6 MG/1
0.6 TABLET ORAL
Qty: 20 TABLET | Refills: 0 | Status: SHIPPED | OUTPATIENT
Start: 2022-05-17 | End: 2022-05-27

## 2022-05-17 RX ORDER — ROCURONIUM BROMIDE 10 MG/ML
INJECTION, SOLUTION INTRAVENOUS AS NEEDED
Status: DISCONTINUED | OUTPATIENT
Start: 2022-05-17 | End: 2022-05-17 | Stop reason: HOSPADM

## 2022-05-17 RX ORDER — SODIUM CHLORIDE, SODIUM LACTATE, POTASSIUM CHLORIDE, CALCIUM CHLORIDE 600; 310; 30; 20 MG/100ML; MG/100ML; MG/100ML; MG/100ML
INJECTION, SOLUTION INTRAVENOUS
Status: DISCONTINUED | OUTPATIENT
Start: 2022-05-17 | End: 2022-05-17 | Stop reason: HOSPADM

## 2022-05-17 RX ORDER — ONDANSETRON 2 MG/ML
INJECTION INTRAMUSCULAR; INTRAVENOUS AS NEEDED
Status: DISCONTINUED | OUTPATIENT
Start: 2022-05-17 | End: 2022-05-17 | Stop reason: HOSPADM

## 2022-05-17 RX ORDER — LIDOCAINE HYDROCHLORIDE 20 MG/ML
INJECTION, SOLUTION EPIDURAL; INFILTRATION; INTRACAUDAL; PERINEURAL AS NEEDED
Status: DISCONTINUED | OUTPATIENT
Start: 2022-05-17 | End: 2022-05-17 | Stop reason: HOSPADM

## 2022-05-17 RX ORDER — HYDROMORPHONE HYDROCHLORIDE 2 MG/ML
0.5 INJECTION, SOLUTION INTRAMUSCULAR; INTRAVENOUS; SUBCUTANEOUS
Status: DISCONTINUED | OUTPATIENT
Start: 2022-05-17 | End: 2022-05-17 | Stop reason: HOSPADM

## 2022-05-17 RX ORDER — ONDANSETRON 2 MG/ML
4 INJECTION INTRAMUSCULAR; INTRAVENOUS
Status: DISCONTINUED | OUTPATIENT
Start: 2022-05-17 | End: 2022-05-17 | Stop reason: HOSPADM

## 2022-05-17 RX ORDER — OXYCODONE HYDROCHLORIDE 5 MG/1
5 TABLET ORAL
Status: DISCONTINUED | OUTPATIENT
Start: 2022-05-17 | End: 2022-05-17 | Stop reason: HOSPADM

## 2022-05-17 RX ORDER — PROPOFOL 10 MG/ML
INJECTION, EMULSION INTRAVENOUS AS NEEDED
Status: DISCONTINUED | OUTPATIENT
Start: 2022-05-17 | End: 2022-05-17 | Stop reason: HOSPADM

## 2022-05-17 RX ORDER — PROTAMINE SULFATE 10 MG/ML
INJECTION, SOLUTION INTRAVENOUS AS NEEDED
Status: DISCONTINUED | OUTPATIENT
Start: 2022-05-17 | End: 2022-05-17 | Stop reason: HOSPADM

## 2022-05-17 RX ORDER — HEPARIN SODIUM 200 [USP'U]/100ML
INJECTION, SOLUTION INTRAVENOUS AS NEEDED
Status: DISCONTINUED | OUTPATIENT
Start: 2022-05-17 | End: 2022-05-17 | Stop reason: HOSPADM

## 2022-05-17 RX ORDER — HEPARIN SODIUM 5000 [USP'U]/100ML
INJECTION, SOLUTION INTRAVENOUS
Status: DISCONTINUED | OUTPATIENT
Start: 2022-05-17 | End: 2022-05-17 | Stop reason: HOSPADM

## 2022-05-17 RX ORDER — ALBUTEROL SULFATE 0.83 MG/ML
2.5 SOLUTION RESPIRATORY (INHALATION) AS NEEDED
Status: DISCONTINUED | OUTPATIENT
Start: 2022-05-17 | End: 2022-05-17 | Stop reason: HOSPADM

## 2022-05-17 RX ORDER — METOPROLOL SUCCINATE 100 MG/1
50 TABLET, EXTENDED RELEASE ORAL 2 TIMES DAILY
Qty: 60 TABLET | Refills: 5 | Status: SHIPPED | OUTPATIENT
Start: 2022-05-17

## 2022-05-17 RX ORDER — EPHEDRINE SULFATE/0.9% NACL/PF 50 MG/5 ML
SYRINGE (ML) INTRAVENOUS AS NEEDED
Status: DISCONTINUED | OUTPATIENT
Start: 2022-05-17 | End: 2022-05-17 | Stop reason: HOSPADM

## 2022-05-17 RX ADMIN — PHENYLEPHRINE HYDROCHLORIDE 300 MCG: 10 INJECTION INTRAVENOUS at 08:07

## 2022-05-17 RX ADMIN — PHENYLEPHRINE HYDROCHLORIDE 100 MCG: 10 INJECTION INTRAVENOUS at 08:11

## 2022-05-17 RX ADMIN — PHENYLEPHRINE HYDROCHLORIDE 200 MCG: 10 INJECTION INTRAVENOUS at 07:49

## 2022-05-17 RX ADMIN — PROTAMINE SULFATE 100 MG: 10 INJECTION, SOLUTION INTRAVENOUS at 10:21

## 2022-05-17 RX ADMIN — PHENYLEPHRINE HYDROCHLORIDE 200 MCG: 10 INJECTION INTRAVENOUS at 07:51

## 2022-05-17 RX ADMIN — ONDANSETRON 4 MG: 2 INJECTION INTRAMUSCULAR; INTRAVENOUS at 10:08

## 2022-05-17 RX ADMIN — Medication 65 MCG/MIN: at 07:59

## 2022-05-17 RX ADMIN — Medication 10 MG: at 08:24

## 2022-05-17 RX ADMIN — HEPARIN SODIUM 9000 UNITS: 1000 INJECTION INTRAVENOUS; SUBCUTANEOUS at 08:20

## 2022-05-17 RX ADMIN — PHENYLEPHRINE HYDROCHLORIDE 300 MCG: 10 INJECTION INTRAVENOUS at 07:57

## 2022-05-17 RX ADMIN — PHENYLEPHRINE HYDROCHLORIDE 200 MCG: 10 INJECTION INTRAVENOUS at 08:50

## 2022-05-17 RX ADMIN — ROCURONIUM BROMIDE 10 MG: 10 INJECTION, SOLUTION INTRAVENOUS at 08:21

## 2022-05-17 RX ADMIN — ROCURONIUM BROMIDE 15 MG: 10 INJECTION, SOLUTION INTRAVENOUS at 09:08

## 2022-05-17 RX ADMIN — SODIUM CHLORIDE, SODIUM LACTATE, POTASSIUM CHLORIDE, AND CALCIUM CHLORIDE: 600; 310; 30; 20 INJECTION, SOLUTION INTRAVENOUS at 07:28

## 2022-05-17 RX ADMIN — SODIUM CHLORIDE, SODIUM LACTATE, POTASSIUM CHLORIDE, AND CALCIUM CHLORIDE: 600; 310; 30; 20 INJECTION, SOLUTION INTRAVENOUS at 10:16

## 2022-05-17 RX ADMIN — PHENYLEPHRINE HYDROCHLORIDE 200 MCG: 10 INJECTION INTRAVENOUS at 07:53

## 2022-05-17 RX ADMIN — HEPARIN SODIUM 2000 UNITS: 1000 INJECTION INTRAVENOUS; SUBCUTANEOUS at 09:55

## 2022-05-17 RX ADMIN — FENTANYL CITRATE 100 MCG: 50 INJECTION INTRAMUSCULAR; INTRAVENOUS at 07:41

## 2022-05-17 RX ADMIN — LIDOCAINE HYDROCHLORIDE 40 MG: 20 INJECTION, SOLUTION EPIDURAL; INFILTRATION; INTRACAUDAL; PERINEURAL at 07:41

## 2022-05-17 RX ADMIN — ROCURONIUM BROMIDE 15 MG: 10 INJECTION, SOLUTION INTRAVENOUS at 10:00

## 2022-05-17 RX ADMIN — HEPARIN SODIUM 40 UNITS/KG/HR: 5000 INJECTION, SOLUTION INTRAVENOUS at 08:41

## 2022-05-17 RX ADMIN — PHENYLEPHRINE HYDROCHLORIDE 300 MCG: 10 INJECTION INTRAVENOUS at 07:59

## 2022-05-17 RX ADMIN — SUGAMMADEX 200 MG: 100 INJECTION, SOLUTION INTRAVENOUS at 10:21

## 2022-05-17 RX ADMIN — PROPOFOL 170 MG: 10 INJECTION, EMULSION INTRAVENOUS at 07:41

## 2022-05-17 RX ADMIN — Medication 10 MG: at 08:09

## 2022-05-17 RX ADMIN — ROCURONIUM BROMIDE 10 MG: 10 INJECTION, SOLUTION INTRAVENOUS at 09:22

## 2022-05-17 RX ADMIN — ROCURONIUM BROMIDE 30 MG: 10 INJECTION, SOLUTION INTRAVENOUS at 07:43

## 2022-05-17 RX ADMIN — HEPARIN SODIUM 9000 UNITS: 1000 INJECTION INTRAVENOUS; SUBCUTANEOUS at 08:41

## 2022-05-17 RX ADMIN — PHENYLEPHRINE HYDROCHLORIDE 300 MCG: 10 INJECTION INTRAVENOUS at 08:01

## 2022-05-17 RX ADMIN — PHENYLEPHRINE HYDROCHLORIDE 300 MCG: 10 INJECTION INTRAVENOUS at 07:55

## 2022-05-17 RX ADMIN — ROCURONIUM BROMIDE 20 MG: 10 INJECTION, SOLUTION INTRAVENOUS at 08:14

## 2022-05-17 RX ADMIN — Medication 10 MG: at 08:28

## 2022-05-17 NOTE — ANESTHESIA PREPROCEDURE EVALUATION
Relevant Problems   CARDIOVASCULAR   (+) Atherosclerosis of native coronary artery of native heart without angina pectoris   (+) Atrial flutter (HCC)   (+) Essential hypertension, benign   (+) LBBB (left bundle branch block)   (+) Persistent atrial fibrillation (HCC)      RENAL FAILURE   (+) Chronic renal disease, stage III       Anesthetic History   No history of anesthetic complications            Review of Systems / Medical History  Patient summary reviewed and pertinent labs reviewed    Pulmonary  Within defined limits                 Neuro/Psych   Within defined limits           Cardiovascular    Hypertension        Dysrhythmias (carvioverted 3/2022) : atrial flutter and atrial fibrillation  CAD and hyperlipidemia    Exercise tolerance: >4 METS  Comments: LBBB    TTE Mod-severe MR, Moderate TI    · LV: Estimated LVEF is 45 - 50%. Abnormal left ventricular septal motion consistent with left bundle branch block. NST: 1/2020:  CONCLUSION:   1. Stress EKG: Non-diagnostic due to resting EKG abnormalities. 2. SPECT Perfusion Imaging: Normal Perfusion. 3. LV Systolic Function is moderately abnormal.   4. Risk Assessment: Low Risk Scan.    GI/Hepatic/Renal     GERD    Renal disease: CRI       Endo/Other  Within defined limits           Other Findings              Physical Exam    Airway  Mallampati: II  TM Distance: > 6 cm  Neck ROM: normal range of motion   Mouth opening: Normal     Cardiovascular  Regular rate and rhythm,  S1 and S2 normal,  no murmur, click, rub, or gallop  Rhythm: regular  Rate: normal      Pertinent negatives: No murmur   Dental  No notable dental hx       Pulmonary  Breath sounds clear to auscultation               Abdominal         Other Findings            Anesthetic Plan    ASA: 3  Anesthesia type: total IV anesthesia            Anesthetic plan and risks discussed with: Patient      MAP goal >75 given CRI

## 2022-05-17 NOTE — Clinical Note
Sheath #all: Closed using Vascade and manual compression. Site secured by Tegaderm and transparent dressing. Pressure held for: 5 minutes.

## 2022-05-17 NOTE — ANESTHESIA POSTPROCEDURE EVALUATION
Procedure(s):  ABLATION A-FIB  W COMPLETE EP STUDY.    total IV anesthesia    Anesthesia Post Evaluation      Multimodal analgesia: multimodal analgesia used between 6 hours prior to anesthesia start to PACU discharge  Patient location during evaluation: bedside  Patient participation: complete - patient participated  Level of consciousness: awake and responsive to light touch  Pain management: adequate  Airway patency: patent  Anesthetic complications: no  Cardiovascular status: acceptable, hemodynamically stable, blood pressure returned to baseline and stable  Respiratory status: acceptable, unassisted, spontaneous ventilation and nonlabored ventilation  Hydration status: acceptable  Post anesthesia nausea and vomiting:  controlled  Final Post Anesthesia Temperature Assessment:  Normothermia (36.0-37.5 degrees C)      INITIAL Post-op Vital signs:   Vitals Value Taken Time   /64 05/17/22 1115   Temp 36.3 °C (97.4 °F) 05/17/22 1115   Pulse 88 05/17/22 1115   Resp 16 05/17/22 1115   SpO2 92 % 05/17/22 1115   Vitals shown include unvalidated device data.

## 2022-05-17 NOTE — PROGRESS NOTES
Assisted OOB & ambulated to BR & on unit. Tolerated activity without difficulty.  Bilateral groins without bleeding or hematoma

## 2022-05-17 NOTE — PROGRESS NOTES
Patient received to [address] [place] [address] room # 9  Ambulatory from [facility]. Patient scheduled for Afib albation today with Dr Fabian Ralph. Procedure reviewed & questions answered, voiced good understanding consent obtained & placed on chart. All medications and medical history reviewed. Will prep patient per orders. Patient & family updated on plan of care. The patient has a fraility score of 3-MANAGING WELL, based on ability to perform ADLS by self.

## 2022-05-17 NOTE — PROGRESS NOTES
Called to pre-assess for Atrial fib ablation with Dr Venessa Wallace , Scheduled 5/17/22 at 7:30am - arrival at [place]. No answer & message left with arrival time of 6am, NPO after MN, HOLD all meds in am & bring list of meds along with overnight bag & check in at [facility] entrance registration.

## 2022-05-17 NOTE — DISCHARGE INSTRUCTIONS
Instructions from     1) Continue medicines for now including your blood thinner, Eliquis, indefinitely and your rhythm medicine, metoprolol. These medicines should be continued until EP follow up.   2) Will start carafate and protonix for one month. This is to protect your esophagus from a possible injury during the ablation procedure. 3) If chest pain occurs (especially when taking a deep breath), it is likely due to pericarditis or inflammation around your heart sac which is related to the ablation procedure. It usually responded to ibuprofen at 400-600 mg every 6-8 hours as needed. If feels moderate to severe, we can consider a medicinecalled colchicine, please call office for a prescription and discuss symptoms with the triage nurse. 4) Call office with any questions. 5) Relax (no heavy lifting/working) for next 48-72 hours. 6) Reduce activity for 1-2 weeks. Walking ok, driving a car ok after 72 hours. Would avoid being outside in the hottest part of the day. Moderate to intense exercise should be avoided until further direction by Dr. Renita Marshall. 7) Most patients a mild flu like illness post AF ablation which usually improves over the course of 1-2 weeks. If there are alarm symptoms such as near fainting, fevers, chills or worsening shortness of breath or chest pain this should prompt a call to our office. If an emergency, call 911.   8) Office follow up in 1 month or as needed. Atrial Fib Ablation Discharge Instructions    1 - Check the puncture site frequently for swelling or bleeding. If you see any bleeding, lie down and apply pressure over the area with a clean town or washcloth. Notify your doctor for any redness, swelling, drainage or oozing from the puncture site. Notify your doctor for any fever or chills.     2 - If the leg with the puncture becomes cold, numb or painful, call [facility] at  [phone].    3 - Activity should be limited for the next 48 hours. Climb stairs as little as possible and avoid any stooping, bending or strenuous activity for 48 hours. No heavy lifting (anything over 10 pounds) for three days. 4 - Do not drive for the first 24 hours post ablation. 5 - You may resume your usual diet. Drink more fluids than usual.    6 - Have a responsible person drive you home and stay with you for at least 24 hours after your ablation. 7 -You may remove the bandage from your Right & Left Groin in 24 hours. You may shower in 24 hours. No tub baths, hot tubs or swimming for one week. Do not place any lotions, creams, powders, ointments over the puncture site for one week. You may place a clean band-aid over the puncture site each day for 5 days. Change this daily. 8 - Continue medicines for now including your blood thinner Eliquis, and your rhythm medicine Metoprolol. These medicines should be continued until EP follow up. 9 - You will need to start Carafate, Colchicine and Protonix for one month. This is to protect your esophagus from a possible injury during the ablation procedure. 10 - If chest pain occurs (especially when taking a deep breath), it is likely due to pericarditis or inflammation around your heart sac which is related to the ablation procedure. It usually responds to ibuprofen at 400-600 mg every 6-8 hours as needed. If feels severe or unrelieved, please call office to discuss symptoms with the triage nurse. 11 - Call office with any questions. 12 - Relax (no heavy lifting/working) for next 48-72 hours. 13 - Reduced activity for 1-2 weeks. Walking ok, driving a car ok after 72 hours. Would avoid being outside in the hottest part of the day. Moderate to intense exercise should be avoided until further direction by Dr. Fabian Ralph. 14 -Most patients can have mild flu like symptoms post AFib ablation which usually improves over the course of 1-2 weeks.  If there are alarming symptoms such as near fainting, fevers, chills or worsening shortness of breath or chest pain this should prompt a call to our office. If an emergency, call 911.     15 - Office follow up in 1 month or as needed. Sedation for a Medical Procedure: Care Instructions     You were given a sedative medication during your visit. While many of the effects will have worn   off before you leave; you may continue to feel some effects for several hours. Common side effects from sedation include:  · Feeling sleepy. (Your doctors and nurses will make sure you are not too sleepy to go home.)  · Nausea and vomiting. This usually does not last long. · Feeling tired. How can you care for yourself at home? Activity    · Don't do anything for 24 hours that requires attention to detail. It takes time for the medicine effects to completely wear off. · Do not make important legal decisions for 24 hours. · Do not sign any legal documents for 24 hours. · Do not drink alcohol today     · For your safety, you should not drive or operate heavy machinery for the remainder of the day     · Rest when you feel tired. Getting enough sleep will help you recover. Diet    · You can eat your normal diet, unless your doctor gives you other instructions. If your stomach is upset, try clear liquids and bland, low-fat foods like plain toast or rice. · Drink plenty of fluids (unless your doctor tells you not to). What to Expect after your Ablation             During the first 48 hours after an ablation, some patients experience:    Mild Chest Discomfort - for a week or so, due to post procedure inflammation. The pain will often worsen with a deep breath or when leaning forward. It should resolve within a week.     Mild shortness of breath with activity    Mild to moderate fatigue - this may last 1-3 weeks    Soreness and bruising in the groin area. This bruising may extend down past the knee. This bruising will go away slowly over a few weeks. During the first month after an ablation, some patients may experience:    Palpitations, fast heart rates can be normal first 6 weeks is the healing phase.     Recurrence of the arrhythmia during this time is not an indicator of failure of the ablation.  You will generally have two sets of puncture sites one on each side of the groin, keep area clean.  You may shower when you get home and remove the band aides. DO NOT go in a tub bath, pool, ocean, or lake until completely healed 7-10 days. Avoid any lotions, powder or creams to the puncture sites.  You may notice a lump at the puncture site smaller than the size of a quarter this is normal.           You will be scheduled with your electrophysiologist in 4-6 weeks after the procedure           Activity Restrictions:    First two days post ablation, you should take it easy.  Do not drive for 24 hours post ablation    Do not lift, pull or push anything greater than 5-10 pounds for 7 days    You may resume normal activity after 1 week, but avoid strenuous activities for 2 weeks      Call us immediately at [phone] for:  Signs of infection, such as fever over 100 degrees F within the first 3 weeks post procedure, drainage from the puncture sites, redness swelling, hot to touch or severe pain.  Lump larger than the size of a quarter near the puncture sites, increasing more painful or seem to be throbbing.     Severe chest pain with deep breath or when leaning forward, or shortness of breath    Slurred speech, difficulties swallowing, loss of sensation, decrease strength in hands or legs or any other stroke like symptoms    Severe chest pain or difficulty breathing

## 2022-05-17 NOTE — PROGRESS NOTES
Discharge instructions given per orders, voiced good understanding of bilateral groin site care, medications & follow up care.  Denies any questions

## 2022-05-17 NOTE — PERIOP NOTES
TRANSFER - OUT REPORT:    Verbal report given to Bautista Elena on Lashaun Morris  being transferred to [facility] for routine post - op       Report consisted of patients Situation, Background, Assessment and   Recommendations(SBAR). Information from the following report(s) SBAR, OR Summary, Procedure Summary, Intake/Output and Cardiac Rhythm NSR/ST was reviewed with the receiving nurse. Lines:   Peripheral IV 05/17/22 Right Antecubital (Active)   Site Assessment Clean, dry, & intact 05/17/22 1045   Phlebitis Assessment 0 05/17/22 1045   Infiltration Assessment 0 05/17/22 1045   Dressing Status Clean, dry, & intact; Occlusive 05/17/22 1045   Dressing Type Transparent;Tape 05/17/22 1045   Hub Color/Line Status Pink;Patent 05/17/22 1045   Alcohol Cap Used No 05/17/22 1045       Peripheral IV 05/17/22 Left Antecubital (Active)   Site Assessment Clean, dry, & intact 05/17/22 1045   Phlebitis Assessment 0 05/17/22 1045   Infiltration Assessment 0 05/17/22 1045   Dressing Status Clean, dry, & intact; Occlusive 05/17/22 1045   Dressing Type Transparent;Tape 05/17/22 1045   Hub Color/Line Status Pink;Patent 05/17/22 1045   Alcohol Cap Used No 05/17/22 1045        Opportunity for questions and clarification was provided. Patient transported with:   Monitor  O2 @ 2 liters  Registered Nurse    VTE prophylaxis orders have been written for Lashaun Morris. Patient and family given floor number and nurses name. Family updated re: pt status after security code verified.

## 2022-05-17 NOTE — Clinical Note
Transseptal Cath Performed under hemodynamic and ICE, utilizing a standard needle, [place] 71cm via a guiding sheath. Needle inserted.

## 2022-05-17 NOTE — PROGRESS NOTES
HOB elevated 30 degrees, groin sites remain CDI. Patient is awake and alert, vital signs cycling every 15 minutes. Meal tray and beverage provided. No other needs at this time.

## 2022-05-17 NOTE — PROGRESS NOTES
TRANSFER - IN REPORT:    Verbal report received from Skye(name) on [employer]  being received from [employer]) for routine post - op      Report consisted of patients Situation, Background, Assessment and   Recommendations(SBAR). Information from the following report(s) Procedure Summary was reviewed with the receiving nurse. Opportunity for questions and clarification was provided. Assessment completed upon patients arrival to unit and care assumed. Bilateral groin sites inspected upon arrival, sites remain CDI. HOB flat, wife at bedside. Call light within reach.

## 2022-05-17 NOTE — PROCEDURES
: Neelima Higgins. Masha Hayward MD    REFERRING: Trey Mohs, MD    Pre-Electrophysiology Diagnosis  1. Atrial fibrillation, persistent   2. Atrial flutter     Procedure Performed  1. EPS afib ablation/pulmonary vein isolation  2. Left atrial pacing recording from the coronary sinus. 3. 3-D Electroanatomical mapping  4. Intracardiac echo  5. Ablation of second arrhythmia  6. LV pacing and recording  7. Transesophageal echo  8. Drug infusion  9. Cardioversion    Anesthesia: General     Estimated Blood Loss: Less than 50 cc     Specimens: * No specimens in log *    Antibiotics: None    Complications: None    Fluoroscopy Time: 0 minutes     Procedure in Detail:  The patient was brought to the electrophysiology lab in the fasting state. The patient was intubated by anesthesiology and an esophageal temperature probe inserted and advanced to a location directly posterior to the LA at the level of the pulmonary veins. The esophageal temperature probe was repositioned throughout the case to a location as close the the ablation catheter as possible. If the esophageal temperature increased 0.5 degrees Celsius ablation was stopped and high flush performed until the temperature returned to baseline. A tranesophageal echocardiogram was performed directly prior to the procedure and was negative for a YUNIOR thrombus (see full report in chart). A Ref-Star CARTO patch was placed, the patient was then prepped and draped in sterile fashion. Venous access was obtained under ultrasound guidance x4 using modified Seldinger technique, with placement of three 8 Fr short sidearm sheaths into the right femoral vein and placement of a 10 Fr sheath into the left femoral vein. Two of the 8 Fr sheaths were upsized to an 8.5 Fr CadiaGuide (Biosense-Feliciano) and Vizigo (Biosense-Feliciano) sheaths, respectively. The patient presented to the EP lab in atrial fibrillation.  A synchronized cardioversion was performed with restoration of sinus rhythm. An intra-cardiac echo probe was prepped, and then inserted into an 10 Fr short sheath and used to localize the fossa ovalis and create LA and pulmonary vein anatomy utilizing [facility]. A Biosense Feliciano Pentaray catheter was then inserted into an 8.5 CardiaGuide sheath and used to create a 3D electroanatomical map of the right atrium, including attempts at His localization and the os of the CS with a focus on minimizing fluoroscopic radiation. Then a Smart Touch porous irrigated BW 3.5 mm ablation catheter was used to map out the body of the CS. A decapolar Biosense Feliciano CS catheter was inserted via an 8Fr sheath and positioned in the mid coronary sinus. Next, a trans-septal needle was inserted into the CardiaGuide sheath and was used to perform a trans-septal puncture with assistance from ICE (intra-cardiac echo) as well as the LA Carto Sound map and the right atrial impedence map. The SL1 sheath was advanced into the LA. Total weight based heparin bolus was administered just after transeptal access, and systemic blood pressure monitored invasively. The patient was then placed on our heparin weight based nomogram for targeted ACT of 300-350 during the ablation procedure. A second trans-septal access was obtained with the ablation catheter using the \"antoinette-wire\" technique. The sheaths were carefully advanced into the LA. The Pentaray catheter was then inserted into the LA via the CardiaGuide sheath and was used to map pulmonary vein potentials and target ablation. An 8 Fr, 3.5 mm tip saline irrigated bidirectional D/F curve Thermo-cool SmartTouch catheter was used for RF ablation and ablation was performed at 40-45 W unless ablation was in the proximity of the esophagus where ablation was performed at 31-35 W. The esophagus was located nearest the left inferior PV.  Antral pulmonary vein isolation was then performed for all 4 pulmonary veins with ablation index targets of 500 and 380 on the anterior and posterior walls, respectively. Entrance and exit block was demonstrated by left atrial pacing and within the pulmonary veins. Lack of any conducted atrial EGMs into the pulmonary veins was documented. Prior to ablation of the right antral pulmonary veins, the ablation catheter was used to pace at high output to try to localize the right phrenic nerve and map its location prior to ablation. Adenosine was injected (12 mg) to demonstrate the lack of early reconnection with the Pentaray in the PVs. There was reconnection in the RUPV antra in a high posterior area which was ablated and resulted in durable entrance/exit block. Repeat adenosine testing was performed with no re-connections observed. The ablation catheter was inserted into the LV, documented LV electrograms and was used to pace the LV for the EP study and for rescue pacing during adenosine infusion. Cavotricuspid Isthmus ablation (right atrial flutter)  Linear ablation across the cavo-tricuspid isthmus was performed starting with 1:2 A:V EGMs along the isthmus at 6pm YOUSUF. The local electrogram activation sequence, differential pacing maneuvers and electrogram timing was used to demonstrate bidirectional block along the cavotricuspid isthmus. Post-Ablation trans-isthmus time: 140 msec  Local double potentials: 88 msec    Next, baseline recordings and a diagnostic EP study was performed. The coronary sinus multipolar catheter was used to pace the left atrium during the EP study. The LA CS electrograms were documented and interpreted during the procedure. A comprehensive EP study was performed with 1:1 AV decremental pacing, atrial extrastimuli and ventricular pacing to assess retrograde conduction. The patient did not have sustained slow pathway conduction or evidence of an accessory pathway.  Ventricular pacing revealed retrograde AV conduction which was concentric and decremental.    At the completion of the ablation and EPS, all catheters were removed, 100 mg of Protamine was administered and all long sheaths were exchanged for short 8 Fr sheaths. Four VASCADE MVP devices were used to close the venotomy sites. The MVP tools were advanced into the 8 Fr and 10 Fr sheaths, respectively; the sheaths were then removed. The collagen was then deployed and a 30 second dwell time was performed to allow for expansion of the collagen. The delivery tools were then removed with adequate hemostasis. The patient tolerated the procedure well with no acute complications recognized. The patient left the EP lab in stable condition, in normal sinus rhythm. Just prior to pulling shealths, the ICE catheter was used to obtain ultrasound images and revealed no evidence of pericardial effusion. ABLATION DATA:  Total procedure time: 125 minutes  Left PV WACA isolation time: 20 minutes 2 seconds  Right PV WACA isolation time: 19 minutes 22 seconds   LA Volume: 115 cc     Baseline Intervals:  AH interval: 104 msec  HV interval: 55 msec  NH interval: 215 msec  QRS duration: 157 msec  QT interval: 459 msec  RR interval: 785 msec    EP Study  AV Wenchebach: 340 msec  AV candace ERP: < or equal to 600/310 msec  Atrial ERP: 600/310 msec  VA Wenchebach: >600 msec    Figure 1. 3D electroanatomic map of the posterior left atrium pre and post AF ablation. Plan of care: The patient will be placed in the same-day discharge protocol, 3-4 hour flat time, followed by ambulation as tolerated and will continue anticoagulation as prescribed pre-procedure. Impression:   1. Successful pulmonary vein isolation (PVAI) of the 4 pulmonary veins. 2.  Successful ablation of CTI-dependent atrial flutter. 3.  Comprehensive EP study with normal intra-atrial, AV candace and infrahissian conduction times. Plan:   1. Continue OAC (Eliquis) indefinitely until EP follow up. 2.  Continue AAD (metoprolol) for at least 4-6 weeks and stop if no AF.   3.  Family updated with plan. 4.  Routine cardiology follow up with Dr. Mariana Castaneda. 5.  Patient and family updated about small risk of atrioesophageal fistula and need for emergent ED evaluation if any concerning symptoms arise. Randy Montez.  Mauri DELAROSA, MS  Clinical Cardiac Electrophysiology  5/17/2022  10:35 AM

## 2022-06-06 ENCOUNTER — TELEPHONE (OUTPATIENT)
Dept: CARDIOLOGY CLINIC | Age: 72
End: 2022-06-06

## 2022-06-06 NOTE — TELEPHONE ENCOUNTER
Spoke to [facility]. States pt has been in the office since 215 and his rate has been 120s since arriving. Instructed to send pt to ER for evaluation of tachy rhythm. Verb understanding.

## 2022-06-07 NOTE — TELEPHONE ENCOUNTER
Spoke to pt. States he feels fine, declines offer for ekg. Made aware rate & rhythm may worsen on steroids, but should settle down. Will call back for worsening sx.

## 2022-06-10 ENCOUNTER — TELEPHONE (OUTPATIENT)
Dept: CARDIOLOGY CLINIC | Age: 72
End: 2022-06-10

## 2022-06-10 NOTE — TELEPHONE ENCOUNTER
----- Message from Raymon Frye MA sent at 6/9/2022  2:00 PM EDT -----  Pt sent a message to Dr. Vj Lopez with concern for response to messages that were sent to triage for Dr. Amna Dawkins. Christina Mcbride sent to me. I am happy to call pt to see how I can help but wanted to run the situation by you first. Please advise on how to proceed. \"I would like you to check out the messages I have send to Dr. Freddie Ruiz, since you are my primary heart Dr.   I might want you to be present at my next visit on June 17 for me to find out a few things are going on that bother me. \"

## 2022-06-17 ENCOUNTER — OFFICE VISIT (OUTPATIENT)
Dept: CARDIOLOGY CLINIC | Age: 72
End: 2022-06-17
Payer: MEDICARE

## 2022-06-17 VITALS
SYSTOLIC BLOOD PRESSURE: 110 MMHG | BODY MASS INDEX: 32.85 KG/M2 | HEART RATE: 120 BPM | HEIGHT: 69 IN | WEIGHT: 221.8 LBS | DIASTOLIC BLOOD PRESSURE: 74 MMHG

## 2022-06-17 DIAGNOSIS — I48.19 PERSISTENT ATRIAL FIBRILLATION (HCC): Primary | ICD-10-CM

## 2022-06-17 PROCEDURE — 1123F ACP DISCUSS/DSCN MKR DOCD: CPT | Performed by: INTERNAL MEDICINE

## 2022-06-17 PROCEDURE — 93000 ELECTROCARDIOGRAM COMPLETE: CPT | Performed by: INTERNAL MEDICINE

## 2022-06-17 PROCEDURE — 99214 OFFICE O/P EST MOD 30 MIN: CPT | Performed by: INTERNAL MEDICINE

## 2022-06-17 RX ORDER — ALBUTEROL SULFATE 90 UG/1
AEROSOL, METERED RESPIRATORY (INHALATION)
COMMUNITY
Start: 2022-06-15 | End: 2022-08-19 | Stop reason: ALTCHOICE

## 2022-06-17 ASSESSMENT — ENCOUNTER SYMPTOMS
ALLERGIC/IMMUNOLOGIC NEGATIVE: 1
RESPIRATORY NEGATIVE: 1
EYES NEGATIVE: 1
GASTROINTESTINAL NEGATIVE: 1

## 2022-06-17 NOTE — TELEPHONE ENCOUNTER
Pt did not respond to MyChart communication or phone calls. Patient in the office 6.17.22 for follow up with  [facility]. This nurse spent significant time with patient regarding concerns. Patient frustrated and feels he is rushed when he is in the office. Does not feel like he is getting the education and answers he needs. Admits receiving MyChart and phone messages. States \"I wouldn't answer for nothing\" due to frustration. States pcp reviewed all information with patient and patient has no additional questions or needs. Patient given this nurse's name for additional concerns.

## 2022-06-17 NOTE — PROGRESS NOTES
[facility] CARDIOLOGY  [address] [facility], [address] [employer] [facility], [address] [place] [address]  PHONE: [phone]        22    [facility] 1950  MRN: [id number]      Referring Cardiologist: Adithya Lima MD      Reason for Consultation: Atrial fibrillation, persistent       ASSESSMENT and PLAN:   Diagnoses and all orders for this visit:      1. Persistent atrial fibrillation ([facility] [address] s/p AF ablation 2022.       2. Essential hypertension, benign      3. Stage 3a chronic kidney disease ([facility] [address]      4. Atherosclerosis of native coronary artery of native heart without angina pectoris      5. LBBB (left bundle branch block)      6. LV dysfunction, EF 45-50%. 80year old male with a history of persistent AF/AFL to whom has undergone an AFL last year and an AF ablation recently. He is now in recurrent AF after his ablation. We discussed options and we will plan on sotalol induction with DCCV. -AF - s/p for AF ablation. Continue Eliquis and metoprolol.     -Plan for sotalol induction. -HFrEF  - continue GDMT.   -Routine cardiac care per Dr. Misa Cerna. -EP follow up PRN. Patient has been instructed and agrees to call our office with any issues or other concerns related to their cardiac condition(s) and/or complaint(s). No follow-up provider specified. Thank you for allowing me to participate in the electrophysiologic care of Mr. Milla Moore. Please contact me if any questions or concerns were to arise. Elmira Ascencio.  Cecilia STILES, MS  Clinical Cardiac Electrophysiology  [facility] Cardiology  22  8:30 AM    ===================================================================  Chief Complant:    Chief Complaint   Patient presents with    Follow[facility]Up from [facility] Atrial Fibrillation     1 month post afib abl    Shortness of Breath        Consultation is requested by Tiff Rahman MD for evaluation of Follow-Up from Hospital, Atrial Fibrillation (1 month post afib abl), and Dispense Refill    albuterol sulfate HFA (PROVENTIL;VENTOLIN;PROAIR) 108 (90 Base) MCG/ACT inhaler       apixaban (ELIQUIS) 5 MG TABS tablet Take 5 mg by mouth 2 times daily      aspirin 81 MG EC tablet Take by mouth daily      atorvastatin (LIPITOR) 40 MG tablet Take by mouth daily      azelastine HCl 0.15 % SOLN 1 spray by Nasal route daily      famotidine (PEPCID) 20 MG tablet TAKE 1 TABLET BY MOUTH ONCE DAILY AT BEDTIME AS NEEDED FOR 90 DAYS      fenofibrate (TRICOR) 145 MG tablet TAKE 1 TABLET BY MOUTH ONCE DAILY FOR 90 DAYS      fluticasone (FLONASE) 50 MCG/ACT nasal spray USE 1 SPRAY(S) IN EACH NOSTRIL TWICE DAILY      lisinopril (PRINIVIL;ZESTRIL) 30 MG tablet Take 30 mg by mouth daily      magnesium oxide (MAG-OX) 400 (240 Mg) MG tablet Take 400 mg by mouth daily      metFORMIN (GLUCOPHAGE) 1000 MG tablet Take 1,000 mg by mouth 2 times daily (with meals)      metoprolol succinate (TOPROL XL) 100 MG extended release tablet Take 100 mg by mouth 2 times daily Indications: 100 mg am 50 mg pm       montelukast (SINGULAIR) 10 MG tablet TAKE 1 TABLET BY MOUTH ONCE DAILY AT BEDTIME FOR ALLERGIES FOR 90 DAYS      sildenafil (REVATIO) 20 MG tablet Take 20 mg by mouth 3 times daily       No current facility-administered medications for this visit.      No Known Allergies    Past Medical History:   Diagnosis Date    Atherosclerosis of native coronary artery of native heart without angina pectoris 2/17/2020    GERD (gastroesophageal reflux disease)     High cholesterol     Hypertension     LBBB (left bundle branch block) 1/28/2020     Past Surgical History:   Procedure Laterality Date    HEENT      left tube placement    ORTHOPEDIC SURGERY      L5 disc surgery    OTHER SURGICAL HISTORY      skin grafts on right hip and thigh     Family History   Problem Relation Age of Onset    Other Neg Hx     Post-op Cognitive Dysfunction Neg Hx     Emergence Delirium Neg Hx     Post-op Nausea/Vomiting Neg Hx  Delayed Awakening Neg Hx     Pseudochol. Deficiency Neg Hx     Malig Hypertherm Neg Hx      Social History     Tobacco Use    Smoking status: Never Smoker    Smokeless tobacco: Never Used   Substance Use Topics    Alcohol use: No       ROS:  A comprehensive review of systems was performed with the pertinent positives and negatives as noted in the HPI in addition to:  Review of Systems   Constitutional: Negative. HENT: Negative. Eyes: Negative. Respiratory: Negative. Cardiovascular: Negative. Gastrointestinal: Negative. Endocrine: Negative. Genitourinary: Negative. Musculoskeletal: Negative. Skin: Negative. Allergic/Immunologic: Negative. Neurological: Negative. Hematological: Negative. Psychiatric/Behavioral: Negative. All other systems reviewed and are negative. PHYSICAL EXAM:   /74   Pulse (!) 120   Ht 5' 9\" (1.753 m)   Wt 221 lb 12.8 oz (100.6 kg)   BMI 32.75 kg/m²      Wt Readings from Last 3 Encounters:   06/17/22 221 lb 12.8 oz (100.6 kg)   05/17/22 226 lb (102.5 kg)   05/12/22 226 lb (102.5 kg)     BP Readings from Last 3 Encounters:   06/17/22 110/74   05/12/22 (!) 138/100   03/31/22 116/80       Gen: Well appearing, well developed, no acute distress  Eyes: Pupils equal, round. Extraocular movements are intact  ENT: Oropharynx clear, no oral lesions, normal dentition  CV: S1S2, regular rate and rhythm, no murmurs, rubs or gallops, normal JVD, no carotid bruits, normal distal pulses, no MARCUS  Pulm: Clear to auscultation bilaterally, no accessory muscle uses, no wheezes or rales  GI: Soft, NT, ND, +BS  Neuro: Alert and oriented, nonfocal  Psych: Appropriate affect  Skin: Normal color and skin turgor  MSK: Normal muscle bulk and tone    Medical problems and test results were reviewed with the patient today. No results found for any visits on 06/17/22.

## 2022-06-17 NOTE — PATIENT INSTRUCTIONS
Patient Education        Atrial Fibrillation: Care Instructions  Your Care Instructions     Atrial fibrillation is an irregular and often fast heartbeat. Treating this condition is important for several reasons. It can cause blood clots, which can travel from your heart to your brain and cause a stroke. If you have a fast heartbeat, you may feel lightheaded, dizzy, and weak. An irregular heartbeatcan also increase your risk for heart failure. Atrial fibrillation is often the result of another heart condition, such as high blood pressure or coronary artery disease. Making changes to improve yourheart condition will help you stay healthy and active. Follow-up care is a key part of your treatment and safety. Be sure to make and go to all appointments, and call your doctor if you are having problems. It's also a good idea to know your test results and keep alist of the medicines you take. How can you care for yourself at home? Medicines     Take your medicines exactly as prescribed. Call your doctor if you think you are having a problem with your medicine. You will get more details on the specific medicines your doctor prescribes.      If your doctor has given you a blood thinner to prevent a stroke, be sure you get instructions about how to take your medicine safely. Blood thinners can cause serious bleeding problems.      Do not take any vitamins, over-the-counter drugs, or herbal products without talking to your doctor first.   Lifestyle changes     Do not smoke. Smoking can increase your chance of a stroke and heart attack. If you need help quitting, talk to your doctor about stop-smoking programs and medicines. These can increase your chances of quitting for good.      Eat a heart-healthy diet.      Stay at a healthy weight. Lose weight if you need to.      Limit alcohol to 2 drinks a day for men and 1 drink a day for women. Too much alcohol can cause health problems.      Avoid colds and flu.  Get a pneumococcal vaccine shot. If you have had one before, ask your doctor whether you need another dose. Get a flu shot every year. If you must be around people with colds or flu, wash your hands often. Activity     If your doctor recommends it, get more exercise. Walking is a good choice. Bit by bit, increase the amount you walk every day. Try for at least 30 minutes on most days of the week. You also may want to swim, bike, or do other activities. Your doctor may suggest that you join a cardiac rehabilitation program so that you can have help increasing your physical activity safely.      Start light exercise if your doctor says it is okay. Even a small amount will help you get stronger, have more energy, and manage stress. Walking is an easy way to get exercise. Start out by walking a little more than you did in the hospital. Gradually increase the amount you walk.      When you exercise, watch for signs that your heart is working too hard. You are pushing too hard if you cannot talk while you are exercising. If you become short of breath or dizzy or have chest pain, sit down and rest immediately.      Check your pulse regularly. Place two fingers on the artery at the palm side of your wrist, in line with your thumb. If your heartbeat seems uneven or fast, talk to your doctor. When should you call for help? Call 911 anytime you think you may need emergency care. For example, call if:     You have symptoms of a heart attack. These may include:  ? Chest pain or pressure, or a strange feeling in the chest.  ? Sweating. ? Shortness of breath. ? Nausea or vomiting. ? Pain, pressure, or a strange feeling in the back, neck, jaw, or upper belly or in one or both shoulders or arms. ? Lightheadedness or sudden weakness. ? A fast or irregular heartbeat. After you call 911, the  may tell you to chew 1 adult-strength or 2 to 4 low-dose aspirin. Wait for an ambulance.  Do not try to drive yourself.      You have symptoms of a stroke. These may include:  ? Sudden numbness, tingling, weakness, or loss of movement in your face, arm, or leg, especially on only one side of your body. ? Sudden vision changes. ? Sudden trouble speaking. ? Sudden confusion or trouble understanding simple statements. ? Sudden problems with walking or balance. ? A sudden, severe headache that is different from past headaches.      You passed out (lost consciousness). Call your doctor now or seek immediate medical care if:     You have new or increased shortness of breath.      You feel dizzy or lightheaded, or you feel like you may faint.      Your heart rate becomes irregular.      You can feel your heart flutter in your chest or skip heartbeats. Tell your doctor if these symptoms are new or worse. Watch closely for changes in your health, and be sure to contact your doctor ifyou have any problems. Where can you learn more? Go to https://[employer]. org and sign in to your [employer] account. Enter U020 in the SSEV box to learn more about \"Atrial Fibrillation: Care Instructions. \"     If you do not have an account, please click on the \"Sign Up Now\" link. Current as of: January 10, 2022               Content Version: 13.2  © 2006-2022 [employer]. Care instructions adapted under license by [facility] ([facility]). If you have questions about a medical condition or this instruction, always ask your healthcare professional. [place] [address] any warranty or liability for your use of this information.

## 2022-06-18 ENCOUNTER — HOSPITAL ENCOUNTER (INPATIENT)
Age: 72
LOS: 5 days | Discharge: HOME | DRG: 871 | End: 2022-06-24
Attending: EMERGENCY MEDICINE | Admitting: FAMILY MEDICINE
Payer: MEDICARE

## 2022-06-18 ENCOUNTER — HOSPITAL ENCOUNTER (EMERGENCY)
Dept: GENERAL RADIOLOGY | Age: 72
Discharge: HOME | DRG: 871 | End: 2022-06-21
Payer: MEDICARE

## 2022-06-18 DIAGNOSIS — R65.21 SEPSIS WITH ACUTE HYPOXIC RESPIRATORY FAILURE AND SEPTIC SHOCK, DUE TO UNSPECIFIED ORGANISM (HCC): Primary | ICD-10-CM

## 2022-06-18 DIAGNOSIS — A41.9 SEPSIS WITH ACUTE HYPOXIC RESPIRATORY FAILURE AND SEPTIC SHOCK, DUE TO UNSPECIFIED ORGANISM (HCC): Primary | ICD-10-CM

## 2022-06-18 DIAGNOSIS — J96.01 SEPSIS WITH ACUTE HYPOXIC RESPIRATORY FAILURE AND SEPTIC SHOCK, DUE TO UNSPECIFIED ORGANISM (HCC): Primary | ICD-10-CM

## 2022-06-18 DIAGNOSIS — R60.9 PERIPHERAL EDEMA: ICD-10-CM

## 2022-06-18 DIAGNOSIS — J18.9 PNEUMONIA OF BOTH LUNGS DUE TO INFECTIOUS ORGANISM, UNSPECIFIED PART OF LUNG: ICD-10-CM

## 2022-06-18 DIAGNOSIS — I48.91 ATRIAL FIBRILLATION WITH RVR (HCC): ICD-10-CM

## 2022-06-18 LAB
BASOPHILS # BLD: 0 K/UL (ref 0–0.2)
BASOPHILS NFR BLD: 0 % (ref 0–2)
DIFFERENTIAL METHOD BLD: ABNORMAL
EOSINOPHIL # BLD: 0.1 K/UL (ref 0–0.8)
EOSINOPHIL NFR BLD: 1 % (ref 0.5–7.8)
ERYTHROCYTE [DISTWIDTH] IN BLOOD BY AUTOMATED COUNT: 14.2 % (ref 11.9–14.6)
HCT VFR BLD AUTO: 37.5 % (ref 41.1–50.3)
HGB BLD-MCNC: 12 G/DL (ref 13.6–17.2)
IMM GRANULOCYTES # BLD AUTO: 0.1 K/UL (ref 0–0.5)
IMM GRANULOCYTES NFR BLD AUTO: 1 % (ref 0–5)
LYMPHOCYTES # BLD: 1.1 K/UL (ref 0.5–4.6)
LYMPHOCYTES NFR BLD: 9 % (ref 13–44)
MCH RBC QN AUTO: 28.8 PG (ref 26.1–32.9)
MCHC RBC AUTO-ENTMCNC: 32 G/DL (ref 31.4–35)
MCV RBC AUTO: 90.1 FL (ref 79.6–97.8)
MONOCYTES # BLD: 0.6 K/UL (ref 0.1–1.3)
MONOCYTES NFR BLD: 5 % (ref 4–12)
NEUTS SEG # BLD: 10.7 K/UL (ref 1.7–8.2)
NEUTS SEG NFR BLD: 84 % (ref 43–78)
NRBC # BLD: 0 K/UL (ref 0–0.2)
PLATELET # BLD AUTO: 361 K/UL (ref 150–450)
PMV BLD AUTO: 9.3 FL (ref 9.4–12.3)
RBC # BLD AUTO: 4.16 M/UL (ref 4.23–5.6)
WBC # BLD AUTO: 12.6 K/UL (ref 4.3–11.1)

## 2022-06-18 PROCEDURE — 96365 THER/PROPH/DIAG IV INF INIT: CPT

## 2022-06-18 PROCEDURE — 80053 COMPREHEN METABOLIC PANEL: CPT

## 2022-06-18 PROCEDURE — 83880 ASSAY OF NATRIURETIC PEPTIDE: CPT

## 2022-06-18 PROCEDURE — 96375 TX/PRO/DX INJ NEW DRUG ADDON: CPT

## 2022-06-18 PROCEDURE — 83735 ASSAY OF MAGNESIUM: CPT

## 2022-06-18 PROCEDURE — 96374 THER/PROPH/DIAG INJ IV PUSH: CPT

## 2022-06-18 PROCEDURE — 71045 X-RAY EXAM CHEST 1 VIEW: CPT

## 2022-06-18 PROCEDURE — 99285 EMERGENCY DEPT VISIT HI MDM: CPT

## 2022-06-18 PROCEDURE — 85025 COMPLETE CBC W/AUTO DIFF WBC: CPT

## 2022-06-18 PROCEDURE — 84484 ASSAY OF TROPONIN QUANT: CPT

## 2022-06-18 ASSESSMENT — PAIN - FUNCTIONAL ASSESSMENT
PAIN_FUNCTIONAL_ASSESSMENT: NONE - DENIES PAIN
PAIN_FUNCTIONAL_ASSESSMENT: NONE - DENIES PAIN

## 2022-06-18 NOTE — Clinical Note
Discharge Plan[de-identified] Home with Office Follow-up   Telemetry/Cardiac Monitoring Required?: Yes   Bed request comments: 3rd floor Telemetry bed please.   Patient is borderline afib with RVR and may need cardizem drip tonight

## 2022-06-19 ENCOUNTER — APPOINTMENT (OUTPATIENT)
Dept: CT IMAGING | Age: 72
DRG: 871 | End: 2022-06-19
Payer: MEDICARE

## 2022-06-19 ENCOUNTER — APPOINTMENT (OUTPATIENT)
Dept: NON INVASIVE DIAGNOSTICS | Age: 72
DRG: 871 | End: 2022-06-19
Payer: MEDICARE

## 2022-06-19 PROBLEM — R65.21 SEPTIC SHOCK (HCC): Status: ACTIVE | Noted: 2022-06-19

## 2022-06-19 PROBLEM — I50.22 CHRONIC SYSTOLIC CONGESTIVE HEART FAILURE (HCC): Status: ACTIVE | Noted: 2020-01-28

## 2022-06-19 PROBLEM — I51.9 LV DYSFUNCTION: Status: ACTIVE | Noted: 2020-01-28

## 2022-06-19 PROBLEM — N18.30 CHRONIC RENAL DISEASE, STAGE III (HCC): Status: ACTIVE | Noted: 2022-05-12

## 2022-06-19 PROBLEM — J96.01 SEPSIS WITH ACUTE HYPOXIC RESPIRATORY FAILURE AND SEPTIC SHOCK (HCC): Status: ACTIVE | Noted: 2022-06-19

## 2022-06-19 PROBLEM — I48.0 PAROXYSMAL ATRIAL FIBRILLATION (HCC): Status: ACTIVE | Noted: 2022-03-31

## 2022-06-19 PROBLEM — A41.9 SEPSIS (HCC): Status: ACTIVE | Noted: 2022-06-19

## 2022-06-19 PROBLEM — R60.0 EDEMA OF BOTH LOWER LEGS: Status: ACTIVE | Noted: 2022-06-19

## 2022-06-19 PROBLEM — I48.19 PERSISTENT ATRIAL FIBRILLATION (HCC): Status: ACTIVE | Noted: 2022-03-31

## 2022-06-19 PROBLEM — I48.91 ATRIAL FIBRILLATION WITH RAPID VENTRICULAR RESPONSE (HCC): Status: ACTIVE | Noted: 2022-06-19

## 2022-06-19 PROBLEM — I10 ESSENTIAL HYPERTENSION, BENIGN: Status: ACTIVE | Noted: 2020-01-28

## 2022-06-19 PROBLEM — I25.10 ATHEROSCLEROSIS OF NATIVE CORONARY ARTERY OF NATIVE HEART WITHOUT ANGINA PECTORIS: Status: ACTIVE | Noted: 2020-02-17

## 2022-06-19 PROBLEM — I44.7 LBBB (LEFT BUNDLE BRANCH BLOCK): Status: ACTIVE | Noted: 2020-01-28

## 2022-06-19 LAB
ALBUMIN SERPL-MCNC: 3 G/DL (ref 3.2–4.6)
ALBUMIN/GLOB SERPL: 1 {RATIO} (ref 1.2–3.5)
ALP SERPL-CCNC: 56 U/L (ref 50–136)
ALT SERPL-CCNC: 70 U/L (ref 12–65)
ANION GAP SERPL CALC-SCNC: 8 MMOL/L (ref 7–16)
ANION GAP SERPL CALC-SCNC: 9 MMOL/L (ref 7–16)
AST SERPL-CCNC: 42 U/L (ref 15–37)
BILIRUB SERPL-MCNC: 0.6 MG/DL (ref 0.2–1.1)
BUN SERPL-MCNC: 23 MG/DL (ref 8–23)
BUN SERPL-MCNC: 23 MG/DL (ref 8–23)
CALCIUM SERPL-MCNC: 8.1 MG/DL (ref 8.3–10.4)
CALCIUM SERPL-MCNC: 8.7 MG/DL (ref 8.3–10.4)
CHLORIDE SERPL-SCNC: 107 MMOL/L (ref 98–107)
CHLORIDE SERPL-SCNC: 108 MMOL/L (ref 98–107)
CO2 SERPL-SCNC: 25 MMOL/L (ref 21–32)
CO2 SERPL-SCNC: 25 MMOL/L (ref 21–32)
CREAT SERPL-MCNC: 1.3 MG/DL (ref 0.8–1.5)
CREAT SERPL-MCNC: 1.4 MG/DL (ref 0.8–1.5)
ECHO AV AREA VTI: 2.1 CM2
ECHO AV AREA/BSA VTI: 1 CM2/M2
ECHO AV MEAN GRADIENT: 2 MMHG
ECHO AV MEAN VELOCITY: 0.7 M/S
ECHO AV PEAK GRADIENT: 5 MMHG
ECHO AV PEAK VELOCITY: 1.1 M/S
ECHO AV VELOCITY RATIO: 0.64
ECHO AV VTI: 16.7 CM
ECHO BSA: 2.26 M2
ECHO LA AREA 2C: 24.9 CM2
ECHO LA AREA 4C: 18.6 CM2
ECHO LA DIAMETER INDEX: 2.23 CM/M2
ECHO LA DIAMETER: 4.9 CM
ECHO LA MAJOR AXIS: 6 CM
ECHO LA MINOR AXIS: 5.9 CM
ECHO LA VOL BP: 62 ML (ref 18–58)
ECHO LA VOL/BSA BIPLANE: 28 ML/M2 (ref 16–34)
ECHO LV E' LATERAL VELOCITY: 12 CM/S
ECHO LV E' SEPTAL VELOCITY: 8 CM/S
ECHO LV FRACTIONAL SHORTENING: 18 % (ref 28–44)
ECHO LV INTERNAL DIMENSION DIASTOLE INDEX: 2.32 CM/M2
ECHO LV INTERNAL DIMENSION DIASTOLIC: 5.1 CM (ref 4.2–5.9)
ECHO LV INTERNAL DIMENSION SYSTOLIC INDEX: 1.91 CM/M2
ECHO LV INTERNAL DIMENSION SYSTOLIC: 4.2 CM
ECHO LV IVSD: 1.3 CM (ref 0.6–1)
ECHO LV MASS 2D: 255.5 G (ref 88–224)
ECHO LV MASS INDEX 2D: 116.1 G/M2 (ref 49–115)
ECHO LV POSTERIOR WALL DIASTOLIC: 1.2 CM (ref 0.6–1)
ECHO LV RELATIVE WALL THICKNESS RATIO: 0.47
ECHO LVOT AREA: 3.5 CM2
ECHO LVOT AV VTI INDEX: 0.59
ECHO LVOT DIAM: 2.1 CM
ECHO LVOT MEAN GRADIENT: 1 MMHG
ECHO LVOT PEAK GRADIENT: 2 MMHG
ECHO LVOT PEAK VELOCITY: 0.7 M/S
ECHO LVOT STROKE VOLUME INDEX: 15.6 ML/M2
ECHO LVOT SV: 34.3 ML
ECHO LVOT VTI: 9.9 CM
ECHO MV A VELOCITY: 0.32 M/S
ECHO MV AREA VTI: 2 CM2
ECHO MV E DECELERATION TIME (DT): 173 MS
ECHO MV E VELOCITY: 1.05 M/S
ECHO MV E/A RATIO: 3.28
ECHO MV E/E' LATERAL: 8.75
ECHO MV E/E' RATIO (AVERAGED): 10.94
ECHO MV E/E' SEPTAL: 13.13
ECHO MV EROA PISA: 33.3 CM2
ECHO MV LVOT VTI INDEX: 1.76
ECHO MV MAX VELOCITY: 1.1 M/S
ECHO MV MEAN GRADIENT: 1 MMHG
ECHO MV MEAN VELOCITY: 0.5 M/S
ECHO MV PEAK GRADIENT: 5 MMHG
ECHO MV REGURGITANT ALIASING (NYQUIST) VELOCITY: 40 CM/S
ECHO MV REGURGITANT PEAK GRADIENT: 55 MMHG
ECHO MV REGURGITANT PEAK VELOCITY: 3.7 M/S
ECHO MV REGURGITANT RADIUS PISA: 0.7 CM
ECHO MV REGURGITANT VOLUME PISA: 3526.3 ML
ECHO MV REGURGITANT VTIA: 106 CM
ECHO MV VTI: 17.4 CM
ECHO RA AREA 4C: 22.8 CM2
ECHO RA END SYSTOLIC VOLUME APICAL 4 CHAMBER INDEX BSA: 32 ML/M2
ECHO RA VOLUME: 71 ML
ECHO RV BASAL DIMENSION: 3.8 CM
ECHO RV INTERNAL DIMENSION: 3.3 CM
ECHO RV TAPSE: 1.5 CM (ref 1.7–?)
ECHO TV REGURGITANT MAX VELOCITY: 3.05 M/S
ECHO TV REGURGITANT PEAK GRADIENT: 37 MMHG
EKG DIAGNOSIS: NORMAL
EKG Q-T INTERVAL: 302 MS
EKG QRS DURATION: 144 MS
EKG QTC CALCULATION (BAZETT): 445 MS
EKG R AXIS: 83 DEGREES
EKG T AXIS: -74 DEGREES
EKG VENTRICULAR RATE: 131 BPM
GLOBULIN SER CALC-MCNC: 3.1 G/DL (ref 2.3–3.5)
GLUCOSE BLD STRIP.AUTO-MCNC: 226 MG/DL (ref 65–100)
GLUCOSE SERPL-MCNC: 170 MG/DL (ref 65–100)
GLUCOSE SERPL-MCNC: 209 MG/DL (ref 65–100)
LACTATE SERPL-SCNC: 1.6 MMOL/L (ref 0.4–2)
MAGNESIUM SERPL-MCNC: 1.9 MG/DL (ref 1.8–2.4)
NT PRO BNP: 3566 PG/ML (ref 5–125)
POTASSIUM SERPL-SCNC: 4.4 MMOL/L (ref 3.5–5.1)
POTASSIUM SERPL-SCNC: 4.6 MMOL/L (ref 3.5–5.1)
PROCALCITONIN SERPL-MCNC: <0.05 NG/ML (ref 0–0.49)
PROT SERPL-MCNC: 6.1 G/DL (ref 6.3–8.2)
SARS-COV-2 RDRP RESP QL NAA+PROBE: NOT DETECTED
SERVICE CMNT-IMP: ABNORMAL
SODIUM SERPL-SCNC: 141 MMOL/L (ref 136–145)
SODIUM SERPL-SCNC: 141 MMOL/L (ref 138–145)
SOURCE: NORMAL
TROPONIN I SERPL HS-MCNC: 35.3 PG/ML (ref 0–14)
TSH, 3RD GENERATION: 2.16 UIU/ML (ref 0.36–3.74)

## 2022-06-19 PROCEDURE — 2500000003 HC RX 250 WO HCPCS: Performed by: NURSE PRACTITIONER

## 2022-06-19 PROCEDURE — 6360000002 HC RX W HCPCS: Performed by: NURSE PRACTITIONER

## 2022-06-19 PROCEDURE — 6360000002 HC RX W HCPCS: Performed by: FAMILY MEDICINE

## 2022-06-19 PROCEDURE — 96365 THER/PROPH/DIAG IV INF INIT: CPT

## 2022-06-19 PROCEDURE — 84443 ASSAY THYROID STIM HORMONE: CPT

## 2022-06-19 PROCEDURE — 99223 1ST HOSP IP/OBS HIGH 75: CPT | Performed by: INTERNAL MEDICINE

## 2022-06-19 PROCEDURE — 6370000000 HC RX 637 (ALT 250 FOR IP): Performed by: FAMILY MEDICINE

## 2022-06-19 PROCEDURE — C8929 TTE W OR WO FOL WCON,DOPPLER: HCPCS

## 2022-06-19 PROCEDURE — 6360000004 HC RX CONTRAST MEDICATION: Performed by: INTERNAL MEDICINE

## 2022-06-19 PROCEDURE — 2580000003 HC RX 258: Performed by: EMERGENCY MEDICINE

## 2022-06-19 PROCEDURE — 2580000003 HC RX 258: Performed by: NURSE PRACTITIONER

## 2022-06-19 PROCEDURE — 93306 TTE W/DOPPLER COMPLETE: CPT | Performed by: INTERNAL MEDICINE

## 2022-06-19 PROCEDURE — 80048 BASIC METABOLIC PNL TOTAL CA: CPT

## 2022-06-19 PROCEDURE — 36415 COLL VENOUS BLD VENIPUNCTURE: CPT

## 2022-06-19 PROCEDURE — 1100000003 HC PRIVATE W/ TELEMETRY

## 2022-06-19 PROCEDURE — 81001 URINALYSIS AUTO W/SCOPE: CPT

## 2022-06-19 PROCEDURE — 87635 SARS-COV-2 COVID-19 AMP PRB: CPT

## 2022-06-19 PROCEDURE — 87040 BLOOD CULTURE FOR BACTERIA: CPT

## 2022-06-19 PROCEDURE — 96375 TX/PRO/DX INJ NEW DRUG ADDON: CPT

## 2022-06-19 PROCEDURE — 82962 GLUCOSE BLOOD TEST: CPT

## 2022-06-19 PROCEDURE — 96374 THER/PROPH/DIAG INJ IV PUSH: CPT

## 2022-06-19 PROCEDURE — 2500000003 HC RX 250 WO HCPCS: Performed by: EMERGENCY MEDICINE

## 2022-06-19 PROCEDURE — 2580000003 HC RX 258: Performed by: FAMILY MEDICINE

## 2022-06-19 PROCEDURE — 84145 PROCALCITONIN (PCT): CPT

## 2022-06-19 PROCEDURE — 2500000003 HC RX 250 WO HCPCS: Performed by: FAMILY MEDICINE

## 2022-06-19 PROCEDURE — P9041 ALBUMIN (HUMAN),5%, 50ML: HCPCS | Performed by: NURSE PRACTITIONER

## 2022-06-19 PROCEDURE — 71260 CT THORAX DX C+: CPT

## 2022-06-19 PROCEDURE — 6360000002 HC RX W HCPCS: Performed by: EMERGENCY MEDICINE

## 2022-06-19 PROCEDURE — 83605 ASSAY OF LACTIC ACID: CPT

## 2022-06-19 PROCEDURE — 6370000000 HC RX 637 (ALT 250 FOR IP): Performed by: INTERNAL MEDICINE

## 2022-06-19 RX ORDER — SODIUM CHLORIDE 9 MG/ML
INJECTION, SOLUTION INTRAVENOUS PRN
Status: DISCONTINUED | OUTPATIENT
Start: 2022-06-19 | End: 2022-06-24 | Stop reason: HOSPADM

## 2022-06-19 RX ORDER — SODIUM CHLORIDE 0.9 % (FLUSH) 0.9 %
5-40 SYRINGE (ML) INJECTION EVERY 12 HOURS SCHEDULED
Status: DISCONTINUED | OUTPATIENT
Start: 2022-06-19 | End: 2022-06-24 | Stop reason: HOSPADM

## 2022-06-19 RX ORDER — MONTELUKAST SODIUM 10 MG/1
10 TABLET ORAL NIGHTLY
Status: DISCONTINUED | OUTPATIENT
Start: 2022-06-19 | End: 2022-06-24 | Stop reason: HOSPADM

## 2022-06-19 RX ORDER — SODIUM CHLORIDE 0.9 % (FLUSH) 0.9 %
5-40 SYRINGE (ML) INJECTION PRN
Status: DISCONTINUED | OUTPATIENT
Start: 2022-06-19 | End: 2022-06-24 | Stop reason: HOSPADM

## 2022-06-19 RX ORDER — TEMAZEPAM 15 MG/1
15 CAPSULE ORAL NIGHTLY PRN
Status: DISCONTINUED | OUTPATIENT
Start: 2022-06-19 | End: 2022-06-24 | Stop reason: HOSPADM

## 2022-06-19 RX ORDER — SODIUM CHLORIDE 9 MG/ML
INJECTION, SOLUTION INTRAVENOUS CONTINUOUS
Status: DISCONTINUED | OUTPATIENT
Start: 2022-06-19 | End: 2022-06-19

## 2022-06-19 RX ORDER — ALBUTEROL SULFATE 90 UG/1
1 AEROSOL, METERED RESPIRATORY (INHALATION) EVERY 4 HOURS PRN
Status: DISCONTINUED | OUTPATIENT
Start: 2022-06-19 | End: 2022-06-24 | Stop reason: HOSPADM

## 2022-06-19 RX ORDER — AZELASTINE HCL 205.5 UG/1
1 SPRAY NASAL DAILY
Status: DISCONTINUED | OUTPATIENT
Start: 2022-06-19 | End: 2022-06-24 | Stop reason: HOSPADM

## 2022-06-19 RX ORDER — FENOFIBRATE 160 MG/1
160 TABLET ORAL DAILY
Status: DISCONTINUED | OUTPATIENT
Start: 2022-06-19 | End: 2022-06-24 | Stop reason: HOSPADM

## 2022-06-19 RX ORDER — GUAIFENESIN/DEXTROMETHORPHAN 100-10MG/5
5 SYRUP ORAL EVERY 4 HOURS PRN
Status: DISCONTINUED | OUTPATIENT
Start: 2022-06-19 | End: 2022-06-24 | Stop reason: HOSPADM

## 2022-06-19 RX ORDER — 0.9 % SODIUM CHLORIDE 0.9 %
30 INTRAVENOUS SOLUTION INTRAVENOUS
Status: DISCONTINUED | OUTPATIENT
Start: 2022-06-19 | End: 2022-06-19

## 2022-06-19 RX ORDER — PHENYLEPHRINE HCL IN 0.9% NACL 50MG/250ML
10-300 PLASTIC BAG, INJECTION (ML) INTRAVENOUS CONTINUOUS
Status: DISCONTINUED | OUTPATIENT
Start: 2022-06-19 | End: 2022-06-19 | Stop reason: HOSPADM

## 2022-06-19 RX ORDER — FUROSEMIDE 10 MG/ML
20 INJECTION INTRAMUSCULAR; INTRAVENOUS ONCE
Status: COMPLETED | OUTPATIENT
Start: 2022-06-19 | End: 2022-06-19

## 2022-06-19 RX ORDER — MAGNESIUM SULFATE IN WATER 40 MG/ML
2000 INJECTION, SOLUTION INTRAVENOUS ONCE
Status: COMPLETED | OUTPATIENT
Start: 2022-06-19 | End: 2022-06-19

## 2022-06-19 RX ORDER — NOREPINEPHRINE BIT/0.9 % NACL 16MG/250ML
1-100 INFUSION BOTTLE (ML) INTRAVENOUS CONTINUOUS
Status: DISCONTINUED | OUTPATIENT
Start: 2022-06-19 | End: 2022-06-19

## 2022-06-19 RX ORDER — 0.9 % SODIUM CHLORIDE 0.9 %
1000 INTRAVENOUS SOLUTION INTRAVENOUS
Status: COMPLETED | OUTPATIENT
Start: 2022-06-19 | End: 2022-06-19

## 2022-06-19 RX ORDER — ATORVASTATIN CALCIUM 40 MG/1
40 TABLET, FILM COATED ORAL NIGHTLY
Status: DISCONTINUED | OUTPATIENT
Start: 2022-06-19 | End: 2022-06-24 | Stop reason: HOSPADM

## 2022-06-19 RX ORDER — ALBUMIN, HUMAN INJ 5% 5 %
25 SOLUTION INTRAVENOUS ONCE
Status: COMPLETED | OUTPATIENT
Start: 2022-06-19 | End: 2022-06-19

## 2022-06-19 RX ORDER — ONDANSETRON 4 MG/1
4 TABLET, ORALLY DISINTEGRATING ORAL EVERY 8 HOURS PRN
Status: DISCONTINUED | OUTPATIENT
Start: 2022-06-19 | End: 2022-06-24 | Stop reason: HOSPADM

## 2022-06-19 RX ORDER — DILTIAZEM HYDROCHLORIDE 5 MG/ML
10 INJECTION INTRAVENOUS
Status: COMPLETED | OUTPATIENT
Start: 2022-06-19 | End: 2022-06-19

## 2022-06-19 RX ORDER — ASPIRIN 81 MG/1
81 TABLET ORAL DAILY
Status: DISCONTINUED | OUTPATIENT
Start: 2022-06-19 | End: 2022-06-24 | Stop reason: HOSPADM

## 2022-06-19 RX ORDER — LANOLIN ALCOHOL/MO/W.PET/CERES
400 CREAM (GRAM) TOPICAL DAILY
Status: DISCONTINUED | OUTPATIENT
Start: 2022-06-19 | End: 2022-06-24 | Stop reason: HOSPADM

## 2022-06-19 RX ORDER — AZITHROMYCIN 250 MG/1
500 TABLET, FILM COATED ORAL DAILY
Status: DISCONTINUED | OUTPATIENT
Start: 2022-06-20 | End: 2022-06-20

## 2022-06-19 RX ORDER — ACETAMINOPHEN 650 MG/1
650 SUPPOSITORY RECTAL EVERY 6 HOURS PRN
Status: DISCONTINUED | OUTPATIENT
Start: 2022-06-19 | End: 2022-06-24 | Stop reason: HOSPADM

## 2022-06-19 RX ORDER — POLYETHYLENE GLYCOL 3350 17 G/17G
17 POWDER, FOR SOLUTION ORAL DAILY PRN
Status: DISCONTINUED | OUTPATIENT
Start: 2022-06-19 | End: 2022-06-24 | Stop reason: HOSPADM

## 2022-06-19 RX ORDER — ACETAMINOPHEN 325 MG/1
650 TABLET ORAL EVERY 6 HOURS PRN
Status: DISCONTINUED | OUTPATIENT
Start: 2022-06-19 | End: 2022-06-24 | Stop reason: HOSPADM

## 2022-06-19 RX ORDER — FLUTICASONE PROPIONATE 50 MCG
1 SPRAY, SUSPENSION (ML) NASAL DAILY
Status: DISCONTINUED | OUTPATIENT
Start: 2022-06-19 | End: 2022-06-24 | Stop reason: HOSPADM

## 2022-06-19 RX ORDER — ONDANSETRON 2 MG/ML
4 INJECTION INTRAMUSCULAR; INTRAVENOUS EVERY 6 HOURS PRN
Status: DISCONTINUED | OUTPATIENT
Start: 2022-06-19 | End: 2022-06-24 | Stop reason: HOSPADM

## 2022-06-19 RX ORDER — METOPROLOL SUCCINATE 100 MG/1
100 TABLET, EXTENDED RELEASE ORAL 2 TIMES DAILY
Status: DISCONTINUED | OUTPATIENT
Start: 2022-06-19 | End: 2022-06-24 | Stop reason: HOSPADM

## 2022-06-19 RX ADMIN — DILTIAZEM HYDROCHLORIDE 10 MG: 5 INJECTION INTRAVENOUS at 02:38

## 2022-06-19 RX ADMIN — ONDANSETRON 4 MG: 2 INJECTION INTRAMUSCULAR; INTRAVENOUS at 19:18

## 2022-06-19 RX ADMIN — MAGNESIUM SULFATE HEPTAHYDRATE 2000 MG: 40 INJECTION, SOLUTION INTRAVENOUS at 05:56

## 2022-06-19 RX ADMIN — ATORVASTATIN CALCIUM 40 MG: 40 TABLET, FILM COATED ORAL at 20:48

## 2022-06-19 RX ADMIN — SODIUM CHLORIDE 1000 ML: 9 INJECTION, SOLUTION INTRAVENOUS at 01:19

## 2022-06-19 RX ADMIN — SODIUM CHLORIDE: 9 INJECTION, SOLUTION INTRAVENOUS at 05:01

## 2022-06-19 RX ADMIN — SODIUM CHLORIDE, PRESERVATIVE FREE 10 ML: 5 INJECTION INTRAVENOUS at 07:44

## 2022-06-19 RX ADMIN — FENOFIBRATE 160 MG: 160 TABLET ORAL at 10:15

## 2022-06-19 RX ADMIN — FUROSEMIDE 20 MG: 10 INJECTION, SOLUTION INTRAMUSCULAR; INTRAVENOUS at 06:10

## 2022-06-19 RX ADMIN — TEMAZEPAM 15 MG: 15 CAPSULE ORAL at 18:54

## 2022-06-19 RX ADMIN — CEFTRIAXONE 1000 MG: 1 INJECTION, POWDER, FOR SOLUTION INTRAMUSCULAR; INTRAVENOUS at 01:12

## 2022-06-19 RX ADMIN — GUAIFENESIN SYRUP AND DEXTROMETHORPHAN 5 ML: 100; 10 SYRUP ORAL at 18:54

## 2022-06-19 RX ADMIN — SODIUM CHLORIDE, PRESERVATIVE FREE 10 ML: 5 INJECTION INTRAVENOUS at 20:48

## 2022-06-19 RX ADMIN — APIXABAN 5 MG: 5 TABLET, FILM COATED ORAL at 20:48

## 2022-06-19 RX ADMIN — SODIUM CHLORIDE 2.5 MG/HR: 900 INJECTION, SOLUTION INTRAVENOUS at 08:06

## 2022-06-19 RX ADMIN — Medication 400 MG: at 07:40

## 2022-06-19 RX ADMIN — IOPAMIDOL 100 ML: 755 INJECTION, SOLUTION INTRAVENOUS at 11:01

## 2022-06-19 RX ADMIN — APIXABAN 5 MG: 5 TABLET, FILM COATED ORAL at 10:15

## 2022-06-19 RX ADMIN — MONTELUKAST 10 MG: 10 TABLET, FILM COATED ORAL at 20:49

## 2022-06-19 RX ADMIN — FUROSEMIDE 20 MG: 20 INJECTION, SOLUTION INTRAMUSCULAR; INTRAVENOUS at 06:42

## 2022-06-19 RX ADMIN — ASPIRIN 81 MG: 81 TABLET ORAL at 07:40

## 2022-06-19 RX ADMIN — METOPROLOL SUCCINATE 100 MG: 100 TABLET, EXTENDED RELEASE ORAL at 07:40

## 2022-06-19 RX ADMIN — PERFLUTREN 2 ML: 6.52 INJECTION, SUSPENSION INTRAVENOUS at 09:55

## 2022-06-19 RX ADMIN — SODIUM CHLORIDE 1000 ML: 9 INJECTION, SOLUTION INTRAVENOUS at 01:28

## 2022-06-19 RX ADMIN — Medication 5 MCG/MIN: at 04:16

## 2022-06-19 RX ADMIN — METOPROLOL SUCCINATE 100 MG: 100 TABLET, EXTENDED RELEASE ORAL at 20:49

## 2022-06-19 RX ADMIN — ALBUMIN (HUMAN) 25 G: 12.5 INJECTION, SOLUTION INTRAVENOUS at 05:56

## 2022-06-19 RX ADMIN — GUAIFENESIN SYRUP AND DEXTROMETHORPHAN 5 ML: 100; 10 SYRUP ORAL at 13:58

## 2022-06-19 RX ADMIN — AZITHROMYCIN MONOHYDRATE 500 MG: 500 INJECTION, POWDER, LYOPHILIZED, FOR SOLUTION INTRAVENOUS at 02:40

## 2022-06-19 RX ADMIN — ONDANSETRON 4 MG: 2 INJECTION INTRAMUSCULAR; INTRAVENOUS at 04:00

## 2022-06-19 ASSESSMENT — ENCOUNTER SYMPTOMS: SHORTNESS OF BREATH: 1

## 2022-06-19 ASSESSMENT — PAIN SCALES - GENERAL
PAINLEVEL_OUTOF10: 0

## 2022-06-19 ASSESSMENT — LIFESTYLE VARIABLES: HOW OFTEN DO YOU HAVE A DRINK CONTAINING ALCOHOL: NEVER

## 2022-06-19 NOTE — ED NOTES
TRANSFER - OUT REPORT:    Verbal report given to [address] on Gail Verdugo  being transferred to [facility] for routine progression of patient care       Report consisted of patient's Situation, Background, Assessment and   Recommendations(SBAR). Information from the following report(s) Nurse Handoff Report, ED Encounter Summary, ED SBAR, [facility], Recent Results and Cardiac Rhythm a-fib was reviewed with the receiving nurse. Lines:   Peripheral IV 06/19/22 Left; Anterior Antecubital (Active)       Peripheral IV 06/19/22 Right;Posterior Hand (Active)   Site Assessment Clean, dry & intact 06/19/22 0229   Line Status Blood return noted 06/19/22 0229   Phlebitis Assessment No symptoms 06/19/22 0229   Infiltration Assessment 0 06/19/22 0229   Dressing Status New dressing applied;Clean, dry & intact 06/19/22 0229   Dressing Type Transparent 06/19/22 0229        Opportunity for questions and clarification was provided.       Patient transported with:  Monitor and Registered Nurse       Jayla Shah RN  06/19/22 8227

## 2022-06-19 NOTE — PROGRESS NOTES
Bedside and Verbal shift change report given to MACEY ROLAND (oncoming nurse) by Geoffrey Rosas RN (offgoing nurse). Report included the following information Nurse Handoff Report.

## 2022-06-19 NOTE — H&P
Hospitalist History and Physical   Admit Date:  2022 11:52 PM   Name:  Neha Shields   Age:  80 y.o. Sex:  male  :  1950   MRN:  [id number]     Presenting Complaint: SOB  Reason(s) for Admission: Sepsis ([address] [A41.9]     History of Present Illness:   Neha Shields is a 80 y.o. male with medical history of atrial fibrillation with scheduled cardioversion on , HTN, CKD who presented to ED with SOB. Symptoms have worsened over the past 3-4 days. Patient reports fevers/chills at home. States that he saw an outpatient doctor about 3 days ago, who checked a BNP and when it resulted ~300, he was started on PO lasix. Patient has been taking lasix as prescribed since then. He denies any improvement in SOB. Upon ER evaluation, patient is tachycardic and hypotensive. CXR shows B infiltrates. WBC is 12. 6. pBNP is elevated at 3,566 and he does have BLE edema. Last, recent echo from 2022 shows mildly reduced systolic function with EF of 45-50%. He has known afib (and is currently in RVR), and is scheduled for CARDIOVERSION on Monday. Given concern for sepsis with pneumonia, Hospitalist consulted for admission. Patient has just completed sepsis bolus. BP and HR have both improved. Will monitor closely on telemetry in case cardizem and/or pressors are needed. **Addendum** Soon after my bedside eval in the ER, patient became hypotensive again. Starting levophed. Will admit to ICU. Review of Systems:  10 systems reviewed and negative except as noted in HPI.   Assessment & Plan:   Sepsis due to pneumonia  - Meets criteria for sepsis/septic shock given associated low Bps  - IV antibiotics  - Blood cultures obtained  - Lactic acid pending  - Patient has received initial sepsis fluid bolus    Afib with RVR  - Remains in rapid afib  - However is also hypotensive  - Will admit to ICU for pressors and cardizem drip  - Consult with Cardiology as patient is scheduled for cardioversion on Monday    Essential HTN  - Holding home meds given current hypotension    CKD, stage 3  - Of note  - Currently at baseline    LV dysfunction  - Does have elevated pBNP and BLE edema  - Recent echo with EF of 45-50%  - Holding lasix that was just prescribed 3 days ago given sepsis as above  - Consult to Cardiology    Disposition: inpatient    Past medical history reviewed. Past Medical History:   Diagnosis Date    Atherosclerosis of native coronary artery of native heart without angina pectoris 2/17/2020    Chronic renal disease, stage III (HCC)     GERD (gastroesophageal reflux disease)     High cholesterol     Hypertension     LBBB (left bundle branch block) 1/28/2020     Past surgical history reviewed. Past Surgical History:   Procedure Laterality Date    HEENT      left tube placement    ORTHOPEDIC SURGERY      L5 disc surgery    OTHER SURGICAL HISTORY      skin grafts on right hip and thigh      No Known Allergies   Social History     Tobacco Use    Smoking status: Never Smoker    Smokeless tobacco: Never Used   Substance Use Topics    Alcohol use: No      Family History   Problem Relation Age of Onset    Other Neg Hx     Post-op Cognitive Dysfunction Neg Hx     Emergence Delirium Neg Hx     Post-op Nausea/Vomiting Neg Hx     Delayed Awakening Neg Hx     Pseudochol. Deficiency Neg Hx     Malig Hypertherm Neg Hx       Family history reviewed and noncontributory to patient's acute condition; no relevant family history unless otherwise noted above. Immunization History   Administered Date(s) Administered    COVID-19, Moderna, Primary or Immunocompromised, PF, 100mcg/0.5mL 02/18/2021, 03/18/2021, 01/28/2022     PTA Medications:  Current Outpatient Medications   Medication Instructions    albuterol sulfate HFA (PROVENTIL;VENTOLIN;PROAIR) 108 (90 Base) MCG/ACT inhaler No dose, route, or frequency recorded.     apixaban (ELIQUIS) 5 mg, Oral, 2 TIMES DAILY    aspirin 81 MG EC tablet Oral, DAILY    atorvastatin (LIPITOR) 40 MG tablet Oral, DAILY    azelastine HCl 0.15 % SOLN 1 spray, Nasal, DAILY    famotidine (PEPCID) 20 MG tablet TAKE 1 TABLET BY MOUTH ONCE DAILY AT BEDTIME AS NEEDED FOR 90 DAYS    fenofibrate (TRICOR) 145 MG tablet TAKE 1 TABLET BY MOUTH ONCE DAILY FOR 90 DAYS    fluticasone (FLONASE) 50 MCG/ACT nasal spray USE 1 SPRAY(S) IN EACH NOSTRIL TWICE DAILY    lisinopril (PRINIVIL;ZESTRIL) 30 mg, Oral, DAILY    magnesium oxide (MAG-OX) 400 mg, Oral, DAILY    metFORMIN (GLUCOPHAGE) 1,000 mg, Oral, 2 TIMES DAILY WITH MEALS    metoprolol succinate (TOPROL XL) 100 mg, Oral, 2 TIMES DAILY    montelukast (SINGULAIR) 10 MG tablet TAKE 1 TABLET BY MOUTH ONCE DAILY AT BEDTIME FOR ALLERGIES FOR 90 DAYS    sildenafil (REVATIO) 20 mg, Oral, 3 TIMES DAILY       Objective:     Patient Vitals for the past 24 hrs:   Temp Pulse Resp BP SpO2   06/19/22 0430 -- (!) 110 -- 80/64 95 %   06/19/22 0423 98.8 °F (37.1 °C) (!) 114 23 (!) 79/67 93 %   06/19/22 0415 -- (!) 116 19 85/60 94 %   06/19/22 0403 -- (!) 121 15 (!) 68/58 96 %   06/19/22 0400 -- (!) 124 13 (!) 77/63 97 %   06/19/22 0343 -- (!) 120 17 (!) 75/62 97 %   06/19/22 0333 -- (!) 119 15 87/65 97 %   06/19/22 0320 -- (!) 126 26 99/74 97 %   06/19/22 0300 -- 99 20 109/81 95 %   06/19/22 0245 -- (!) 108 20 110/75 93 %   06/19/22 0235 -- (!) 133 (!) 33 116/84 94 %   06/19/22 0230 -- (!) 126 29 96/84 94 %   06/19/22 0215 -- (!) 132 24 105/73 94 %   06/19/22 0205 -- (!) 119 (!) 31 -- 92 %   06/19/22 0200 -- (!) 132 27 119/82 93 %   06/18/22 2356 100.1 °F (37.8 °C) (!) 133 26 86/74 95 %   06/18/22 2304 98.3 °F (36.8 °C) (!) 114 22 103/67 93 %       Estimated body mass index is 32.64 kg/m² as calculated from the following:    Height as of this encounter: 5' 9\" (1.753 m). Weight as of this encounter: 221 lb (100.2 kg). No intake or output data in the 24 hours ending 06/19/22 5473      Physical Exam:  General:    Well nourished. No overt distress  Head:  Normocephalic, atraumatic  Eyes:  Sclerae appear normal.  Pupils equally round. HENT:  Nares appear normal, no drainage. Moist mucous membranes  Neck:  No restricted ROM. Trachea midline  CV:   RRR. S1/S2 auscultated  Lungs: Tachycardic, irregular  Abdomen: Bowel sounds present. Soft, nontender, nondistended. Extremities: Warm and dry. No cyanosis or clubbing. No edema. Skin:     No rashes. Normal turgor. Normal coloration  Neuro:  Cranial nerves II-XII grossly intact. Sensation intact  Psych:  Normal mood and affect.   Alert and oriented x3    Data Ordered and Personally Reviewed:    Last 24hr Labs:  Recent Results (from the past 24 hour(s))   EKG 12 Lead    Collection Time: 06/18/22 11:05 PM   Result Value Ref Range    Ventricular Rate 131 BPM    QRS Duration 144 ms    Q-T Interval 302 ms    QTc Calculation (Bazett) 445 ms    R Axis 83 degrees    T Axis -74 degrees    Diagnosis       Atrial fibrillation with rapid ventricular response   CBC with Auto Differential    Collection Time: 06/18/22 11:20 PM   Result Value Ref Range    WBC 12.6 (H) 4.3 - 11.1 K/uL    RBC 4.16 (L) 4.23 - 5.6 M/uL    Hemoglobin 12.0 (L) 13.6 - 17.2 g/dL    Hematocrit 37.5 (L) 41.1 - 50.3 %    MCV 90.1 79.6 - 97.8 FL    MCH 28.8 26.1 - 32.9 PG    MCHC 32.0 31.4 - 35.0 g/dL    RDW 14.2 11.9 - 14.6 %    Platelets 747 352 - 198 K/uL    MPV 9.3 (L) 9.4 - 12.3 FL    nRBC 0.00 0.0 - 0.2 K/uL    Differential Type AUTOMATED      Seg Neutrophils 84 (H) 43 - 78 %    Lymphocytes 9 (L) 13 - 44 %    Monocytes 5 4.0 - 12.0 %    Eosinophils % 1 0.5 - 7.8 %    Basophils 0 0.0 - 2.0 %    Immature Granulocytes 1 0.0 - 5.0 %    Segs Absolute 10.7 (H) 1.7 - 8.2 K/UL    Absolute Lymph # 1.1 0.5 - 4.6 K/UL    Absolute Mono # 0.6 0.1 - 1.3 K/UL    Absolute Eos # 0.1 0.0 - 0.8 K/UL    Basophils Absolute 0.0 0.0 - 0.2 K/UL    Absolute Immature Granulocyte 0.1 0.0 - 0.5 K/UL   Comprehensive Metabolic Panel    Collection Time: 06/18/22 11:20 PM   Result Value Ref Range    Sodium 141 136 - 145 mmol/L    Potassium 4.6 3.5 - 5.1 mmol/L    Chloride 107 98 - 107 mmol/L    CO2 25 21 - 32 mmol/L    Anion Gap 9 7 - 16 mmol/L    Glucose 209 (H) 65 - 100 mg/dL    BUN 23 8 - 23 MG/DL    CREATININE 1.40 0.8 - 1.5 MG/DL    GFR African American >60 >60 ml/min/1.73m2    GFR Non- 53 (L) >60 ml/min/1.73m2    Calcium 8.7 8.3 - 10.4 MG/DL    Total Bilirubin 0.6 0.2 - 1.1 MG/DL    ALT 70 (H) 12 - 65 U/L    AST 42 (H) 15 - 37 U/L    Alk Phosphatase 56 50 - 136 U/L    Total Protein 6.1 (L) 6.3 - 8.2 g/dL    Albumin 3.0 (L) 3.2 - 4.6 g/dL    Globulin 3.1 2.3 - 3.5 g/dL    Albumin/Globulin Ratio 1.0 (L) 1.2 - 3.5     Magnesium    Collection Time: 06/18/22 11:20 PM   Result Value Ref Range    Magnesium 1.9 1.8 - 2.4 mg/dL   Troponin    Collection Time: 06/18/22 11:20 PM   Result Value Ref Range    Troponin, High Sensitivity 35.3 (H) 0 - 14 pg/mL   proBNP, N-TERMINAL    Collection Time: 06/18/22 11:20 PM   Result Value Ref Range    NT Pro-BNP 3,566 (H) 5 - 125 PG/ML   Lactic Acid    Collection Time: 06/19/22  1:11 AM   Result Value Ref Range    Lactic Acid, Plasma 1.6 0.4 - 2.0 MMOL/L   Procalcitonin    Collection Time: 06/19/22  1:11 AM   Result Value Ref Range    Procalcitonin <0.05 0.00 - 0.49 ng/mL   COVID-19, Rapid    Collection Time: 06/19/22  1:11 AM    Specimen: Nasopharyngeal   Result Value Ref Range    Source NASAL      SARS-CoV-2, Rapid Not detected NOTD           Signed:  Cassi Harris MD

## 2022-06-19 NOTE — CONSULTS
[facility]   Initial Cardiac Evaluation                 Primary Cardiologist: Dr. Mindy Lara    Primary EP: Dr. Scarlett Aggarwal    Primary Care Physician: Dr. Arias Gonzales Physician: Andrew Whitman MD    Subjective:     Patient is a 80 y.o.  male with PMHx of Persistent Afib, HTN, CKD 3a, CAD, LBBB, DM and HLD who presented to the ED with c/o BLE edema, cough and fevers. He reports that he saw his MD at [address] [facility] [address] [place] last week for \"respiratory issues\" and fevers. He had labs drawn and reports MD told him his BNP was elevated around 400. He was given Rx for 2 days of PO Lasix. [facility] States he was not having BLE edema at that time, just the resp complaints. Reports increased urination with meds. He states Sat he began to have the BLE edema. He states Dr. Scarlett Aggarwal told him to go to the ED if he developed swelling in his legs or abdomen so he came to the ED. Initial w/u in ED with elevated WBC, CXR with bilat infiltrates, pBNP 3,566. He was hypotensive and felt to be septic. He was started on IV Levophed gtt. He was felt to have PNA and was given 2L IVF resuscitation and IV Abx. He was noted to have rapid Afib on arrival with  on EKG. He was admitted by the Hospitalist and Cardiology asked to evaluate. Of note, LA was 1.6 and PCT <0.05. COVID swab was negative. Pt reports he has been having intermittent fevers for approx 6b months. Usually occur at night after lying down to sleep. States he wakes up profusely sweating to the point that they have had to change out the sheets. States he has checked his temp during these events and temp never over 100F. States the cough and sputum has been going on approx 2 months. Reports he had an Afib ablation in May and felt better for approx 2 weeks. Unsure exactly when he went back into Afib but states he thinks it was around 2wks post-ablation. He states HR has been elevated since then.  He had OV with Dr. Scarlett Aggarwal on 6/17 and HR at appt was 120bpm. He denies CP. Reports general dyspnea as well as cough. Denies increased abdominal girth and actually feels like he lost weight when taking the diuretics. Past Medical History:   Diagnosis Date    Atherosclerosis of native coronary artery of native heart without angina pectoris 2/17/2020    Chronic renal disease, stage III (HCC)     GERD (gastroesophageal reflux disease)     High cholesterol     Hypertension     LBBB (left bundle branch block) 1/28/2020      Past Surgical History:   Procedure Laterality Date    HEENT      left tube placement    ORTHOPEDIC SURGERY      L5 disc surgery    OTHER SURGICAL HISTORY      skin grafts on right hip and thigh      No Known Allergies  Social History     Tobacco Use    Smoking status: Never Smoker    Smokeless tobacco: Never Used   Substance Use Topics    Alcohol use: No      FH:   Family History   Problem Relation Age of Onset    Other Neg Hx     Post-op Cognitive Dysfunction Neg Hx     Emergence Delirium Neg Hx     Post-op Nausea/Vomiting Neg Hx     Delayed Awakening Neg Hx     Pseudochol.  Deficiency Neg Hx     Malig Hypertherm Neg Hx         Review of Systems  General: no acute weight change, no weakness, no fatigue, +fevers, no chills, +diaphoresis  Skin: no rashes, lumps, wounds, sores or other skin changes  HEENT: no headache, dizziness, lightheadedness, vision changes, hearing changes, tinnitus, vertigo, sinus pressure/pain, bleeding gums, sore throat, or hoarseness  Neck: no swollen glands, goiter, pain or stiffness  Respiratory: +cough, no congestion, +sputum, no hemoptysis, +dyspnea, no wheezing  Cardiovascular: + as per HPI, no palpitations, syncope, orthopnea, PND  Gastrointestinal: no increased reflux, no constipation, diarrhea, liver problems, GI bleeding, N/V, no abdominal pain or distension  Urinary: no frequency, urgency, hematuria, burning/pain with urination, flank pain, polyuria, nocturia, or difficulty urinating  Peripheral Vascular: no claudication, leg cramps, prior DVTs, +swelling of BLE, no color change, or swelling with redness or tenderness  Musculoskeletal: no muscle or joint pain/stiffness, joint swelling, erythema of joints, or back pain  Psychiatric: no increased depression, anxiety or excessive stress  Neurological: no sensory or motor loss, seizures, syncope, tremors, numbness, tingling, no changes in mood, attention, or speech, no changes in orientation, memory, insight, or judgment. Hematologic: no anemia, no easy bruising or bleeding  Endocrine: no thyroid problems, no heat or cold intolerance, excessive sweating, polyuria, polydipsia, +diabetes. Objective:       /63   Pulse (!) 113   Temp 97.7 °F (36.5 °C) (Oral)   Resp 23   Ht 5' 9\" (1.753 m)   Wt 232 lb 9.4 oz (105.5 kg)   SpO2 92%   BMI 34.35 kg/m²     No intake/output data recorded. No intake/output data recorded. Physical Exam:  General: well developed, well nourished, NAD, resting comfortably  HEENT: PERRLA, sclera clear, EOMs intact, R eye deviates outward  Neck: supple, no JVD, trachea midline   Heart: S1S2 with IRIR, Afib on bedside monitor, , no MRG  Lungs: clear bilaterally upper with mildly diminished bases, normal effort on RA, no wheezing or rales presently  Abd: soft, nontender, nondistended, +bowel sounds, obese and rotund  Ext: warm, trace BLE edema, calves supple/nontender, pulses 2+ bilaterally  Skin: warm and dry, intact to view  Psychiatric: appropriate mood and affect, cooperative  Neurologic: A&O X 3, moves all 4 equally, CNs intact      ECG: Afib RVR     ECHO: ordered and pending, last EF 45-50%    CXR: Impression: Bilateral lung infiltrates as described above may represent pneumonia.      Data Review:      Recent Results (from the past 24 hour(s))   EKG 12 Lead    Collection Time: 06/18/22 11:05 PM   Result Value Ref Range    Ventricular Rate 131 BPM    QRS Duration 144 ms    Q-T Interval 302 ms    QTc Calculation (Bazett) 445 ms    R Axis 83 degrees    T Axis -74 degrees    Diagnosis       Atrial fibrillation with rapid ventricular response   CBC with Auto Differential    Collection Time: 06/18/22 11:20 PM   Result Value Ref Range    WBC 12.6 (H) 4.3 - 11.1 K/uL    RBC 4.16 (L) 4.23 - 5.6 M/uL    Hemoglobin 12.0 (L) 13.6 - 17.2 g/dL    Hematocrit 37.5 (L) 41.1 - 50.3 %    MCV 90.1 79.6 - 97.8 FL    MCH 28.8 26.1 - 32.9 PG    MCHC 32.0 31.4 - 35.0 g/dL    RDW 14.2 11.9 - 14.6 %    Platelets 210 836 - 813 K/uL    MPV 9.3 (L) 9.4 - 12.3 FL    nRBC 0.00 0.0 - 0.2 K/uL    Differential Type AUTOMATED      Seg Neutrophils 84 (H) 43 - 78 %    Lymphocytes 9 (L) 13 - 44 %    Monocytes 5 4.0 - 12.0 %    Eosinophils % 1 0.5 - 7.8 %    Basophils 0 0.0 - 2.0 %    Immature Granulocytes 1 0.0 - 5.0 %    Segs Absolute 10.7 (H) 1.7 - 8.2 K/UL    Absolute Lymph # 1.1 0.5 - 4.6 K/UL    Absolute Mono # 0.6 0.1 - 1.3 K/UL    Absolute Eos # 0.1 0.0 - 0.8 K/UL    Basophils Absolute 0.0 0.0 - 0.2 K/UL    Absolute Immature Granulocyte 0.1 0.0 - 0.5 K/UL   Comprehensive Metabolic Panel    Collection Time: 06/18/22 11:20 PM   Result Value Ref Range    Sodium 141 136 - 145 mmol/L    Potassium 4.6 3.5 - 5.1 mmol/L    Chloride 107 98 - 107 mmol/L    CO2 25 21 - 32 mmol/L    Anion Gap 9 7 - 16 mmol/L    Glucose 209 (H) 65 - 100 mg/dL    BUN 23 8 - 23 MG/DL    CREATININE 1.40 0.8 - 1.5 MG/DL    GFR African American >60 >60 ml/min/1.73m2    GFR Non- 53 (L) >60 ml/min/1.73m2    Calcium 8.7 8.3 - 10.4 MG/DL    Total Bilirubin 0.6 0.2 - 1.1 MG/DL    ALT 70 (H) 12 - 65 U/L    AST 42 (H) 15 - 37 U/L    Alk Phosphatase 56 50 - 136 U/L    Total Protein 6.1 (L) 6.3 - 8.2 g/dL    Albumin 3.0 (L) 3.2 - 4.6 g/dL    Globulin 3.1 2.3 - 3.5 g/dL    Albumin/Globulin Ratio 1.0 (L) 1.2 - 3.5     Magnesium    Collection Time: 06/18/22 11:20 PM   Result Value Ref Range    Magnesium 1.9 1.8 - 2.4 mg/dL   Troponin    Collection Time: 06/18/22 11:20 PM Result Value Ref Range    Troponin, High Sensitivity 35.3 (H) 0 - 14 pg/mL   proBNP, N-TERMINAL    Collection Time: 06/18/22 11:20 PM   Result Value Ref Range    NT Pro-BNP 3,566 (H) 5 - 125 PG/ML   Lactic Acid    Collection Time: 06/19/22  1:11 AM   Result Value Ref Range    Lactic Acid, Plasma 1.6 0.4 - 2.0 MMOL/L   Procalcitonin    Collection Time: 06/19/22  1:11 AM   Result Value Ref Range    Procalcitonin <0.05 0.00 - 0.49 ng/mL   COVID-19, Rapid    Collection Time: 06/19/22  1:11 AM    Specimen: Nasopharyngeal   Result Value Ref Range    Source NASAL      SARS-CoV-2, Rapid Not detected NOTD     POCT Glucose    Collection Time: 06/19/22  5:00 AM   Result Value Ref Range    POC Glucose 226 (H) 65 - 100 mg/dL    Performed by: [facility]          Assessment/Plan:   Principal Problem:    Sepsis -- management as per Hospitalist, received IVF resuscitation and currently off Levo gtt but with marginal BP -- will have RN restart pressors but will try Loc for less HR effect, monitor effect and titrate as tolerated    Active Problems:    Edema of both lower legs -- new/worse, will try to give some IV Lasix -- will start with 20mg IV and monitor tolerance, plan to repeat 20mg (total dose of 40mg) pending response, noted Albumin 3.0 -- will supplement, Pt denies weight gain but wt at OV 6/17 221lb, weight on admit 232lb      Essential hypertension, benign -- BP marginal presently      LV dysfunction -- check ECHO this AM, potentially worsening LVEF 2/2 persistent Afib with RVR ??  leading to edema       LBBB (left bundle branch block) -- chronic      Chronic renal disease, stage III -- renal fxn appears baseline, follow daily labs and weights, watch I&O      Persistent atrial fibrillation -- plan was for OP DCCV and subsequent admit for Sotalol load on 6/20, will try to control rate with IV Dilt for now, current -114, HR on OV note from 6/17 was 120bpm, appears HR has been elevated for some time, cont Eliquis, keep K>4, Mag>2 -- supplement Mag now, titrate IV Dilt as BP tolerates        CHANEL Moore - CNP-C  6/19/2022  6:10 AM

## 2022-06-19 NOTE — ED TRIAGE NOTES
Pt presents with bilateral leg swelling, is supposed to have cardioversion prodecure on Monday, dry cough denies recent changes to medications, fever at home.  Denies CP

## 2022-06-19 NOTE — PROGRESS NOTES
TRANSFER - IN REPORT:    Verbal report received from Adelina Oh RN on [employer]  being received from ED for routine progression of patient care      Report consisted of patient's Situation, Background, Assessment and   Recommendations(SBAR). Information from the following report(s) Nurse Handoff Report, ED SBAR, Adult Overview, Intake/Output, MAR, Recent Results, Cardiac Rhythm AFIB and Alarm Parameters was reviewed with the receiving nurse. Opportunity for questions and clarification was provided. Assessment completed upon patient's arrival to unit and care assumed. Skin assessment performed with Desirae Lim RN and Jeff Antoine RN. Skin is intact, alleyn placed on sacrum. Patient belongings at bedside and charted in flowsheet.

## 2022-06-19 NOTE — PROGRESS NOTES
Hospitalist Progress Note   Admit Date:  2022 11:52 PM   Name:  Yanci Morgan   Age:  80 y.o. Sex:  male  :  1950   MRN:  [id number]   Room:  [address] [place] [address]    Presenting Complaint: Fevers, cough, and SOB  Reason(s) for Admission: Peripheral edema [R60.9]  Atrial fibrillation with RVR ([address] [I48.91]  Sepsis ([address] [A41.9]  Pneumonia of both lungs due to infectious organism, unspecified part of lung [J18.9]  Sepsis with acute hypoxic respiratory failure and septic shock, due to unspecified organism ([address] [A41.9, R65.21, J96.01]     Hospital Course & Interval History:   80 y.o. male with medical history of atrial fibrillation with scheduled outpatient cardioversion on , HTN, CKD presented to ED on 22 with worsening SOB and cough with associated fevers and chills x 3-4 days. States that he saw an outpatient doctor about 3 days prior, who checked a BNP and when it resulted ~300, he was started on PO lasix. Despite taking the Lasix, his SOB worsened. Upon ER evaluation, patient was tachycardic and hypotensive. CXR shows B infiltrates. WBC is 12. 6. pBNP is elevated at 3,566 and with BLE edema. Last, recent echo from 2022 shows mildly reduced systolic function with EF of 45-50%. He has known afib (and is currently in RVR), and is scheduled for CARDIOVERSION on Monday. He was given IVFs and antibiotics in the ER. His BP initially improved then dropped so he was admitted to the ICU on Phenylephrine and Cardizem gtt. Subjective/24hr Events (22):  SOB improved. Complains of cough, present x 2 months. Denies CP/palpitations. Denies F/C since admission, but \"when it happens I'm soaking wet. \" Denies N/V/D.       Assessment & Plan:     Principal Problem:    Septic shock ([address]  - Likely due to pneumonia and rapid Afib  - Was on pressors initially  - Now off pressors since last evening  - Continue Ceftriaxone + Azithromycin  - Not on IVFs with fluid overload  - Blood cultures pending  - Check UA  - Procal normal  - Lactic acid normal    Active Problems:    Atrial fibrillation with rapid ventricular response (HCC)  - Likely exacerbated by pneumonia and sepsis  - Continue Cardizem gtt  - Continue Eliquis  - Cardiology following  - Possible cardioversion on 6/20      Bilateral pneumonia  - CXR with multilobar infilatrates  - Concerning for edema vs atypical pneumonia  - Check CT Chest to better visualize  - Continue Ceftriaxone + Azithromycin      Essential hypertension, benign  - Hypotension improving  - On Cardizem gtt  - Off pressors      Chronic systolic congestive heart failure (HCC)  - EF 45%  - Continue Lasix  - Repeat ECHO  - Cardiology following      Paroxysmal atrial fibrillation ([address]  - See above      Edema of both lower legs  - Due to fluid overload  - Continue IV Lasix      Atherosclerosis of native coronary artery of native heart without angina pectoris      LBBB (left bundle branch block)      Chronic renal disease, stage III (HCC)  - Creatinine at baseline      High cholesterol    Diet:  ADULT DIET;  Regular; Low Fat/Low Chol/High Fiber/2 gm Na  DVT PPx: Eliquis  Code status: Full Code    Hospital Problems:  Hospital Problems           Last Modified POA    * (Principal) Septic shock ([address] 6/19/2022 Yes    Atrial fibrillation with rapid ventricular response ([address] 6/19/2022 Yes    Bilateral pneumonia 6/19/2022 Yes    Essential hypertension, benign 6/19/2022 Yes    Chronic systolic congestive heart failure ([address] 6/19/2022 Yes    Paroxysmal atrial fibrillation ([address] 6/19/2022 Yes    Edema of both lower legs 6/19/2022 Yes    Atherosclerosis of native coronary artery of native heart without angina pectoris 6/19/2022 Yes    LBBB (left bundle branch block) 6/19/2022 Yes    Chronic renal disease, stage III ([address] 6/19/2022 Yes    High cholesterol 6/19/2022 Yes          Objective:     Patient Vitals for the past 24 hrs:   Temp Pulse Resp BP SpO2   06/19/22 0830 -- (!) 115 24 117/71 93 % 06/19/22 0815 -- (!) 115 27 120/71 94 %   06/19/22 0800 -- (!) 118 29 117/83 (!) 89 %   06/19/22 0745 -- (!) 121 26 (!) 136/98 95 %   06/19/22 0730 -- (!) 118 (!) 77 100/74 92 %   06/19/22 0715 -- (!) 105 (!) 65 102/80 94 %   06/19/22 0700 97.7 °F (36.5 °C) (!) 101 30 112/70 92 %   06/19/22 0645 -- (!) 117 17 (!) 120/56 94 %   06/19/22 0630 -- (!) 115 (!) 34 133/75 97 %   06/19/22 0615 -- (!) 115 20 114/73 96 %   06/19/22 0600 -- (!) 110 29 116/79 93 %   06/19/22 0545 -- (!) 111 (!) 33 103/70 90 %   06/19/22 0530 -- (!) 108 (!) 34 98/76 96 %   06/19/22 0528 97.7 °F (36.5 °C) (!) 113 23 102/63 92 %   06/19/22 0515 -- (!) 113 (!) 32 102/63 93 %   06/19/22 0504 -- -- -- 120/77 --   06/19/22 0430 -- (!) 110 -- 80/64 95 %   06/19/22 0423 98.8 °F (37.1 °C) (!) 114 23 (!) 79/67 93 %   06/19/22 0415 -- (!) 116 19 85/60 94 %   06/19/22 0403 -- (!) 121 15 (!) 68/58 96 %   06/19/22 0400 -- (!) 124 13 (!) 77/63 97 %   06/19/22 0343 -- (!) 120 17 (!) 75/62 97 %   06/19/22 0333 -- (!) 119 15 87/65 97 %   06/19/22 0320 -- (!) 126 26 99/74 97 %   06/19/22 0300 -- 99 20 109/81 95 %   06/19/22 0245 -- (!) 108 20 110/75 93 %   06/19/22 0235 -- (!) 133 (!) 33 116/84 94 %   06/19/22 0230 -- (!) 126 29 96/84 94 %   06/19/22 0215 -- (!) 132 24 105/73 94 %   06/19/22 0205 -- (!) 119 (!) 31 -- 92 %   06/19/22 0200 -- (!) 132 27 119/82 93 %   06/18/22 2356 100.1 °F (37.8 °C) (!) 133 26 86/74 95 %   06/18/22 2304 98.3 °F (36.8 °C) (!) 114 22 103/67 93 %       Oxygen Therapy  SpO2: 93 %  Pulse Oximetry Type: Continuous  Pulse via Oximetry: 115 beats per minute  SPO2 Low Alarm Limit POX: 89  Pulse Oximeter Device Mode: Continuous  Pulse Oximeter Device Location: Left,Finger  O2 Device: None (Room air)  Skin Assessment: Clean, dry, & intact  Skin Protection for O2 Device: N/A    Estimated body mass index is 34.35 kg/m² as calculated from the following:    Height as of this encounter: 5' 9\" (1.753 m).     Weight as of this encounter: 232 lb 9.4 oz (105.5 kg). Intake/Output Summary (Last 24 hours) at 6/19/2022 0911  Last data filed at 6/19/2022 0830  Gross per 24 hour   Intake 500 ml   Output 1225 ml   Net -725 ml         Physical Exam:   Blood pressure 117/71, pulse (!) 115, temperature 97.7 °F (36.5 °C), temperature source Oral, resp. rate 24, height 5' 9\" (1.753 m), weight 232 lb 9.4 oz (105.5 kg), SpO2 93 %. General:    Well nourished. No overt distress  Head:  Normocephalic, atraumatic  Eyes:  Sclerae appear normal.  Pupils equally round. ENT:  Nares appear normal, no drainage. Moist oral mucosa  Neck:  No restricted ROM. Trachea midline   CV:   Tachy, irregular. No m/r/g. No jugular venous distension. Lungs:   CTAB. No wheezing, rhonchi, or rales. Respirations even, unlabored  Abdomen: Bowel sounds present. Soft, nontender, nondistended. Extremities: No cyanosis or clubbing. No edema  Skin:     No rashes and normal coloration. Warm and dry. Neuro:  CN II-XII grossly intact. Sensation intact. A&Ox3  Psych:  Normal mood and affect.       I have reviewed ordered lab tests and independently visualized imaging below:    Recent Labs:  Recent Results (from the past 48 hour(s))   EKG 12 Lead    Collection Time: 06/18/22 11:05 PM   Result Value Ref Range    Ventricular Rate 131 BPM    QRS Duration 144 ms    Q-T Interval 302 ms    QTc Calculation (Bazett) 445 ms    R Axis 83 degrees    T Axis -74 degrees    Diagnosis       Atrial fibrillation with rapid ventricular response   CBC with Auto Differential    Collection Time: 06/18/22 11:20 PM   Result Value Ref Range    WBC 12.6 (H) 4.3 - 11.1 K/uL    RBC 4.16 (L) 4.23 - 5.6 M/uL    Hemoglobin 12.0 (L) 13.6 - 17.2 g/dL    Hematocrit 37.5 (L) 41.1 - 50.3 %    MCV 90.1 79.6 - 97.8 FL    MCH 28.8 26.1 - 32.9 PG    MCHC 32.0 31.4 - 35.0 g/dL    RDW 14.2 11.9 - 14.6 %    Platelets 064 880 - 532 K/uL    MPV 9.3 (L) 9.4 - 12.3 FL    nRBC 0.00 0.0 - 0.2 K/uL    Differential Type AUTOMATED      Seg Neutrophils 84 (H) 43 - 78 %    Lymphocytes 9 (L) 13 - 44 %    Monocytes 5 4.0 - 12.0 %    Eosinophils % 1 0.5 - 7.8 %    Basophils 0 0.0 - 2.0 %    Immature Granulocytes 1 0.0 - 5.0 %    Segs Absolute 10.7 (H) 1.7 - 8.2 K/UL    Absolute Lymph # 1.1 0.5 - 4.6 K/UL    Absolute Mono # 0.6 0.1 - 1.3 K/UL    Absolute Eos # 0.1 0.0 - 0.8 K/UL    Basophils Absolute 0.0 0.0 - 0.2 K/UL    Absolute Immature Granulocyte 0.1 0.0 - 0.5 K/UL   Comprehensive Metabolic Panel    Collection Time: 06/18/22 11:20 PM   Result Value Ref Range    Sodium 141 136 - 145 mmol/L    Potassium 4.6 3.5 - 5.1 mmol/L    Chloride 107 98 - 107 mmol/L    CO2 25 21 - 32 mmol/L    Anion Gap 9 7 - 16 mmol/L    Glucose 209 (H) 65 - 100 mg/dL    BUN 23 8 - 23 MG/DL    CREATININE 1.40 0.8 - 1.5 MG/DL    GFR African American >60 >60 ml/min/1.73m2    GFR Non- 53 (L) >60 ml/min/1.73m2    Calcium 8.7 8.3 - 10.4 MG/DL    Total Bilirubin 0.6 0.2 - 1.1 MG/DL    ALT 70 (H) 12 - 65 U/L    AST 42 (H) 15 - 37 U/L    Alk Phosphatase 56 50 - 136 U/L    Total Protein 6.1 (L) 6.3 - 8.2 g/dL    Albumin 3.0 (L) 3.2 - 4.6 g/dL    Globulin 3.1 2.3 - 3.5 g/dL    Albumin/Globulin Ratio 1.0 (L) 1.2 - 3.5     Magnesium    Collection Time: 06/18/22 11:20 PM   Result Value Ref Range    Magnesium 1.9 1.8 - 2.4 mg/dL   Troponin    Collection Time: 06/18/22 11:20 PM   Result Value Ref Range    Troponin, High Sensitivity 35.3 (H) 0 - 14 pg/mL   proBNP, N-TERMINAL    Collection Time: 06/18/22 11:20 PM   Result Value Ref Range    NT Pro-BNP 3,566 (H) 5 - 125 PG/ML   Lactic Acid    Collection Time: 06/19/22  1:11 AM   Result Value Ref Range    Lactic Acid, Plasma 1.6 0.4 - 2.0 MMOL/L   Procalcitonin    Collection Time: 06/19/22  1:11 AM   Result Value Ref Range    Procalcitonin <0.05 0.00 - 0.49 ng/mL   COVID-19, Rapid    Collection Time: 06/19/22  1:11 AM    Specimen: Nasopharyngeal   Result Value Ref Range    Source NASAL      SARS-CoV-2, Rapid Not detected NOTD POCT Glucose    Collection Time: 06/19/22  5:00 AM   Result Value Ref Range    POC Glucose 226 (H) 65 - 100 mg/dL    Performed by: [employer]    Basic Metabolic Panel w/ Reflex to MG    Collection Time: 06/19/22  6:27 AM   Result Value Ref Range    Sodium 141 138 - 145 mmol/L    Potassium 4.4 3.5 - 5.1 mmol/L    Chloride 108 (H) 98 - 107 mmol/L    CO2 25 21 - 32 mmol/L    Anion Gap 8 7 - 16 mmol/L    Glucose 170 (H) 65 - 100 mg/dL    BUN 23 8 - 23 MG/DL    CREATININE 1.30 0.8 - 1.5 MG/DL    GFR African American >60 >60 ml/min/1.73m2    GFR Non- 58 (L) >60 ml/min/1.73m2    Calcium 8.1 (L) 8.3 - 10.4 MG/DL   TSH    Collection Time: 06/19/22  6:27 AM   Result Value Ref Range    TSH, 3RD GENERATION 2.160 0.358 - 3.740 uIU/mL       Other Studies:  XR CHEST PORTABLE    Result Date: 6/18/2022  EXAM: XR CHEST PORTABLE HISTORY: Shortness of Breath. TECHNIQUE: Frontal chest. COMPARISON: None available. FINDINGS: The cardiac silhouette, mediastinum, and pulmonary vasculature are within normal limits. There is no pneumothorax. Infiltrates are seen in the right upper and lower lung and in the left lower lung. There is pleural thickening along the right chest wall. There there is no appreciable pleural effusion No significant osseous abnormalities are observed. Bilateral lung infiltrates as described above may represent pneumonia.        Current Meds:  Current Facility-Administered Medications   Medication Dose Route Frequency    apixaban (ELIQUIS) tablet 5 mg  5 mg Oral BID    aspirin EC tablet 81 mg  81 mg Oral Daily    atorvastatin (LIPITOR) tablet 40 mg  40 mg Oral Nightly    azelastine HCl 0.15 % SOLN 1 spray- pt supply (Patient Supplied)  1 spray Nasal Daily    fenofibrate (TRIGLIDE) tablet 160 mg  160 mg Oral Daily    fluticasone (FLONASE) 50 MCG/ACT nasal spray 1 spray  1 spray Each Nostril Daily    lisinopril (PRINIVIL;ZESTRIL) tablet 30 mg  30 mg Oral Daily    magnesium oxide (MAG-OX) tablet 400 mg  400 mg Oral Daily    metoprolol succinate (TOPROL XL) extended release tablet 100 mg  100 mg Oral BID    montelukast (SINGULAIR) tablet 10 mg  10 mg Oral Nightly    albuterol sulfate HFA (PROVENTIL;VENTOLIN;PROAIR) 108 (90 Base) MCG/ACT inhaler 1 puff  1 puff Inhalation Q4H PRN    sodium chloride flush 0.9 % injection 5-40 mL  5-40 mL IntraVENous 2 times per day    sodium chloride flush 0.9 % injection 5-40 mL  5-40 mL IntraVENous PRN    0.9 % sodium chloride infusion   IntraVENous PRN    ondansetron (ZOFRAN-ODT) disintegrating tablet 4 mg  4 mg Oral Q8H PRN    Or    ondansetron (ZOFRAN) injection 4 mg  4 mg IntraVENous Q6H PRN    polyethylene glycol (GLYCOLAX) packet 17 g  17 g Oral Daily PRN    acetaminophen (TYLENOL) tablet 650 mg  650 mg Oral Q6H PRN    Or    acetaminophen (TYLENOL) suppository 650 mg  650 mg Rectal Q6H PRN    [START ON 6/20/2022] azithromycin (ZITHROMAX) tablet 500 mg  500 mg Oral Daily    [START ON 6/20/2022] cefTRIAXone (ROCEPHIN) 1000 mg IVPB in NS 50ml minibag  1,000 mg IntraVENous Q24H    phenylephrine (MARY-SYNEPHRINE) 50 mg/250 mL infusion   mcg/min IntraVENous Continuous    dilTIAZem 100 mg in sodium chloride 0.9 % 100 mL infusion (ADD-Scott)  2.5-15 mg/hr IntraVENous Continuous    iopamidol (ISOVUE-370) 76 % injection 100 mL  100 mL IntraVENous ONCE PRN       Discharge Plan:     Location: Home  Days: 2-3  PPD Ordered: N/A    Signed:  Katrina Kawasaki, DO    Part of this note may have been written by using a voice dictation software. The note has been proof read but may still contain some grammatical/other typographical errors.

## 2022-06-19 NOTE — ED PROVIDER NOTES
[facility] Emergency Department Provider Note                   PCP:                Adilia Parada MD               Age: 80 y.o. Sex: male       ICD-10-CM    1. Sepsis with acute hypoxic respiratory failure and septic shock, due to unspecified organism ([facility]  A41.9     R65.21     J96.01    2. Pneumonia of both lungs due to infectious organism, unspecified part of lung  J18.9    3. Atrial fibrillation with RVR (HCC)  I48.91 Transthoracic echocardiogram (TTE) complete with contrast, bubble, strain, and 3D PRN     Transthoracic echocardiogram (TTE) complete with contrast, bubble, strain, and 3D PRN   4. Peripheral edema  R60.9        DISPOSITION         Current Discharge Medication List          Orders Placed This Encounter   Procedures    Critical Care    Culture, Blood 1    Culture, Blood 1    COVID-19, Rapid    XR CHEST PORTABLE    CT CHEST W CONTRAST    CBC with Auto Differential    Comprehensive Metabolic Panel    Magnesium    Troponin    proBNP, N-TERMINAL    Lactic Acid    Lactic Acid    Procalcitonin    Basic Metabolic Panel w/ Reflex to MG    CBC with Auto Differential    TSH    Urinalysis with Reflex to Culture    Magnesium    ADULT DIET;  Regular; Low Fat/Low Chol/High Fiber/2 gm Na    Continuous Pulse Oximetry    Telemetry monitoring - 48 hour duration    Vital signs per unit routine    Admission/Observation order previously placed    Up with assistance    Full code    Inpatient consult to Cardiology    Initiate Oxygen Therapy Protocol    POCT Glucose    EKG 12 Lead    Saline lock IV    ADMIT TO INPATIENT    ADMIT TO INPATIENT    Transfer Patient        Jarrett Gaxiola DO 3:29 AM      MDM  Number of Diagnoses or Management Options  Atrial fibrillation with RVR (HCC)  Peripheral edema  Pneumonia of both lungs due to infectious organism, unspecified part of lung  Sepsis with acute hypoxic respiratory failure and septic shock, due to unspecified organism [facility])  Diagnosis management comments: CHF, COPD, pneumonia, PE,    MI, coronary artery disease, unstable angina, coronary artery disease,    Atrial fibrillation, cardiac arrhythmia, PVC, medication induced palpitations, heart block,  electrolyte induced palpitations,    Aortic dissection, aortic aneurysm,    GERD, musculoskeletal pain, costochondritis, rib fracture, pleurisy,         Amount and/or Complexity of Data Reviewed  Clinical lab tests: ordered and reviewed  Tests in the radiology section of CPT®: ordered and reviewed  Tests in the medicine section of CPT®: reviewed and ordered  Obtain history from someone other than the patient: yes  Review and summarize past medical records: yes  Discuss the patient with other providers: yes  Independent visualization of images, tracings, or specimens: yes         Teena Rothman is a 80 y.o. male who presents to the Emergency Department with chief complaint of    Chief Complaint   Patient presents with    Leg Swelling    Cough      67-year-old male presenting to the emergency department today complaining of increasing shortness of breath over the last couple of days however his wife says for the last 3 to 4 weeks he has been having night sweats and has had some mild shortness of breath with exertion. Patient is started having fevers and chills over the last 2 days. He had a temperature at home and here it is 100.1. He does have a cough as well. He went to his family doctor and because the BNP level was 300 he was given Lasix and has taken 3 days of this medicine but has no history of congestive heart failure. The patient denies any chest pain. He does have a history of atrial fibrillation and was scheduled for an electrocardioversion on Monday. All other systems reviewed and are negative. Review of Systems   Constitutional: Positive for activity change, appetite change, chills, diaphoresis, fatigue and fever. Respiratory: Positive for shortness of breath. Cardiovascular: Positive for leg swelling. All other systems reviewed and are negative. Past Medical History:   Diagnosis Date    Atherosclerosis of native coronary artery of native heart without angina pectoris 2/17/2020    Chronic renal disease, stage III (HCC)     GERD (gastroesophageal reflux disease)     High cholesterol     Hypertension     LBBB (left bundle branch block) 1/28/2020        Past Surgical History:   Procedure Laterality Date    HEENT      left tube placement    ORTHOPEDIC SURGERY      L5 disc surgery    OTHER SURGICAL HISTORY      skin grafts on right hip and thigh        Family History   Problem Relation Age of Onset    Other Neg Hx     Post-op Cognitive Dysfunction Neg Hx     Emergence Delirium Neg Hx     Post-op Nausea/Vomiting Neg Hx     Delayed Awakening Neg Hx     Pseudochol. Deficiency Neg Hx     Malig Hypertherm Neg Hx            Social Connections:     Frequency of Communication with Friends and Family: Not on file    Frequency of Social Gatherings with Friends and Family: Not on file    Attends Taoist Services: Not on file    Active Member of Clubs or Organizations: Not on file    Attends Club or Organization Meetings: Not on file    Marital Status: Not on file        No Known Allergies     Vitals signs and nursing note reviewed. Patient Vitals for the past 4 hrs:   Temp Pulse Resp BP SpO2   06/20/22 0114 98.1 °F (36.7 °C) (!) 115 24 115/88 94 %          Physical Exam     GENERAL:The patient has Body mass index is 34.47 kg/m². Well-hydrated. VITAL SIGNS: Heart rate, blood pressure, respiratory rate reviewed as recorded in  nurse's notes  EYES: Pupils reactive. Extraocular motion intact. No conjunctival redness or drainage. EARS: No external masses or lesions. NOSE: No nasal drainage or epistaxis. MOUTH/THROAT: Pharynx clear; airway patent. NECK: Supple, no meningeal signs. Trachea midline. No masses or thyromegaly.   LUNGS: no accessory muscle use.  Coarse rhonchi throughout the lung fields bilaterally. The patient is tachypneic on arrival.  CHEST: No deformity  CARDIOVASCULAR: Irregularly irregular with murmur noted but no gallops or rubs present. ABDOMEN: Soft without tenderness. No palpable masses or organomegaly. No  peritoneal signs. No rigidity. EXTREMITIES: No clubbing or cyanosis. No joint swelling. Normal muscle tone. No  restricted range of motion appreciated. Pitting edema in the lower extremities bilaterally. NEUROLOGIC: Sensation is grossly intact. Cranial nerve exam reveals face is  symmetrical, tongue is midline speech is clear. SKIN: No rash or petechiae. Good skin turgor palpated. PSYCHIATRIC: Alert and oriented. Appropriate behavior and judgment. EKG 12 Lead    Date/Time: 6/19/2022 12:59 AM  Performed by: Alicia Anguiano DO  Authorized by: Alicia Anguiano DO     ECG reviewed by ED Physician in the absence of a cardiologist: yes    Comments:      Atrial fibrillation with rapid ventricular rate 131 bpm.  Critical Care  Performed by: Alicia Anguiano DO  Authorized by: Alicia Anguiano DO     Comments:      Critical care time: 79 minutes of critical care time was performed in the emergency department. This was separate from any other procedures listed during the patients emergency department course. The failure to initiate these interventions on an urgent basis would likely have resulted in sudden, clinically significant or life-threatening deterioration in the patients condition.               Labs Reviewed   CBC WITH AUTO DIFFERENTIAL - Abnormal; Notable for the following components:       Result Value    WBC 12.6 (*)     RBC 4.16 (*)     Hemoglobin 12.0 (*)     Hematocrit 37.5 (*)     MPV 9.3 (*)     Seg Neutrophils 84 (*)     Lymphocytes 9 (*)     Segs Absolute 10.7 (*)     All other components within normal limits   COMPREHENSIVE METABOLIC PANEL - Abnormal; Notable for the following components:    Glucose 209 (*)     GFR Non-African American 53 (*)     ALT 70 (*)     AST 42 (*)     Total Protein 6.1 (*)     Albumin 3.0 (*)     Albumin/Globulin Ratio 1.0 (*)     All other components within normal limits   TROPONIN - Abnormal; Notable for the following components:    Troponin, High Sensitivity 35.3 (*)     All other components within normal limits   PROBNP, N-TERMINAL - Abnormal; Notable for the following components:    NT Pro-BNP 3,566 (*)     All other components within normal limits   BASIC METABOLIC PANEL W/ REFLEX TO MG FOR LOW K - Abnormal; Notable for the following components:    Chloride 108 (*)     Glucose 170 (*)     GFR Non-African American 58 (*)     Calcium 8.1 (*)     All other components within normal limits   POCT GLUCOSE - Abnormal; Notable for the following components:    POC Glucose 226 (*)     All other components within normal limits   CULTURE, BLOOD 1   CULTURE, BLOOD 1   COVID-19, RAPID   MAGNESIUM   LACTIC ACID   PROCALCITONIN   TSH   URINALYSIS WITH REFLEX TO CULTURE   BASIC METABOLIC PANEL W/ REFLEX TO MG FOR LOW K   CBC WITH AUTO DIFFERENTIAL   MAGNESIUM        CT CHEST W CONTRAST   Final Result   Multilobar atypical pneumonia         XR CHEST PORTABLE   Final Result   Bilateral lung infiltrates as described above may represent pneumonia. ED Course as of 06/20/22 0329   Sun Jun 19, 2022   0029 Segs Absolute(!): 10.7 [KH]   0029 WBC(!): 12.6 [KH]   0029 NT Pro-BNP(!): 3,566 [KH]   0029 XR CHEST PORTABLE  FINDINGS:   The cardiac silhouette, mediastinum, and pulmonary vasculature are within normal  limits. There is no pneumothorax.     Infiltrates are seen in the right upper and lower lung and in the left lower  lung.     There is pleural thickening along the right chest wall. There there is no  appreciable pleural effusion     No significant osseous abnormalities are observed.  [KH]   0059 I talked to the patient and his wife about the findings in the emergency department need for admission to the hospital.  They are both agreeable with this plan. The hospitalist Dr. Harvey Evans was consulted and will come and see the patient planning on admission to the hospital as well. Because of the patient's congestive heart failure he is only on get 15 cc/kg in spite of his sepsis. He will also be getting IV fluids and frequent reassessment of his rapid rate and hypotension. [AR]   6996 SARS-CoV-2, Rapid: Not detected [KH]      ED Course User Index  [KH] Carmelina Baez DO        Voice dictation software was used during the making of this note. This software is not perfect and grammatical and other typographical errors may be present. This note has not been completely proofread for errors.        Carmelina Baez DO  06/19/22 [address],   06/20/22 6085

## 2022-06-20 LAB
AMORPH CRY URNS QL MICRO: ABNORMAL
ANION GAP SERPL CALC-SCNC: 6 MMOL/L (ref 7–16)
APPEARANCE UR: ABNORMAL
BACTERIA URNS QL MICRO: 0 /HPF
BASOPHILS # BLD: 0 K/UL (ref 0–0.2)
BASOPHILS NFR BLD: 0 % (ref 0–2)
BILIRUB UR QL: ABNORMAL
BUN SERPL-MCNC: 28 MG/DL (ref 8–23)
CALCIUM SERPL-MCNC: 8 MG/DL (ref 8.3–10.4)
CASTS URNS QL MICRO: 0 /LPF
CHLORIDE SERPL-SCNC: 105 MMOL/L (ref 98–107)
CO2 SERPL-SCNC: 29 MMOL/L (ref 21–32)
COLOR UR: ABNORMAL
CREAT SERPL-MCNC: 1.5 MG/DL (ref 0.8–1.5)
CRYSTALS URNS QL MICRO: 0 /LPF
DIFFERENTIAL METHOD BLD: ABNORMAL
EOSINOPHIL # BLD: 0 K/UL (ref 0–0.8)
EOSINOPHIL NFR BLD: 0 % (ref 0.5–7.8)
EPI CELLS #/AREA URNS HPF: 0 /HPF
ERYTHROCYTE [DISTWIDTH] IN BLOOD BY AUTOMATED COUNT: 14.3 % (ref 11.9–14.6)
GLUCOSE SERPL-MCNC: 143 MG/DL (ref 65–100)
GLUCOSE UR STRIP.AUTO-MCNC: 100 MG/DL
HCT VFR BLD AUTO: 36.5 % (ref 41.1–50.3)
HGB BLD-MCNC: 11.5 G/DL (ref 13.6–17.2)
HGB UR QL STRIP: ABNORMAL
IMM GRANULOCYTES # BLD AUTO: 0.1 K/UL (ref 0–0.5)
IMM GRANULOCYTES NFR BLD AUTO: 1 % (ref 0–5)
KETONES UR QL STRIP.AUTO: NEGATIVE MG/DL
LEUKOCYTE ESTERASE UR QL STRIP.AUTO: NEGATIVE
LYMPHOCYTES # BLD: 1.9 K/UL (ref 0.5–4.6)
LYMPHOCYTES NFR BLD: 15 % (ref 13–44)
MAGNESIUM SERPL-MCNC: 2.2 MG/DL (ref 1.8–2.4)
MCH RBC QN AUTO: 28.6 PG (ref 26.1–32.9)
MCHC RBC AUTO-ENTMCNC: 31.5 G/DL (ref 31.4–35)
MCV RBC AUTO: 90.8 FL (ref 79.6–97.8)
MONOCYTES # BLD: 0.6 K/UL (ref 0.1–1.3)
MONOCYTES NFR BLD: 5 % (ref 4–12)
MUCOUS THREADS URNS QL MICRO: 0 /LPF
NEUTS SEG # BLD: 9.7 K/UL (ref 1.7–8.2)
NEUTS SEG NFR BLD: 79 % (ref 43–78)
NITRITE UR QL STRIP.AUTO: NEGATIVE
NRBC # BLD: 0.02 K/UL (ref 0–0.2)
OTHER OBSERVATIONS: ABNORMAL
PH UR STRIP: 5 [PH] (ref 5–9)
PLATELET # BLD AUTO: 320 K/UL (ref 150–450)
PMV BLD AUTO: 9.3 FL (ref 9.4–12.3)
POTASSIUM SERPL-SCNC: 4.9 MMOL/L (ref 3.5–5.1)
PROT UR STRIP-MCNC: 30 MG/DL
RBC # BLD AUTO: 4.02 M/UL (ref 4.23–5.6)
RBC #/AREA URNS HPF: 0 /HPF
SODIUM SERPL-SCNC: 140 MMOL/L (ref 138–145)
SP GR UR REFRACTOMETRY: 1.03 (ref 1–1.02)
URINE CULTURE IF INDICATED: ABNORMAL
UROBILINOGEN UR QL STRIP.AUTO: 1 EU/DL (ref 0.2–1)
WBC # BLD AUTO: 12.3 K/UL (ref 4.3–11.1)
WBC URNS QL MICRO: 0 /HPF

## 2022-06-20 PROCEDURE — 2580000003 HC RX 258: Performed by: FAMILY MEDICINE

## 2022-06-20 PROCEDURE — 6370000000 HC RX 637 (ALT 250 FOR IP): Performed by: FAMILY MEDICINE

## 2022-06-20 PROCEDURE — 85025 COMPLETE CBC W/AUTO DIFF WBC: CPT

## 2022-06-20 PROCEDURE — 6360000002 HC RX W HCPCS: Performed by: FAMILY MEDICINE

## 2022-06-20 PROCEDURE — 2700000000 HC OXYGEN THERAPY PER DAY

## 2022-06-20 PROCEDURE — 2500000003 HC RX 250 WO HCPCS: Performed by: NURSE PRACTITIONER

## 2022-06-20 PROCEDURE — 1100000003 HC PRIVATE W/ TELEMETRY

## 2022-06-20 PROCEDURE — 99223 1ST HOSP IP/OBS HIGH 75: CPT | Performed by: INTERNAL MEDICINE

## 2022-06-20 PROCEDURE — 2580000003 HC RX 258: Performed by: NURSE PRACTITIONER

## 2022-06-20 PROCEDURE — 51798 US URINE CAPACITY MEASURE: CPT

## 2022-06-20 PROCEDURE — 80048 BASIC METABOLIC PNL TOTAL CA: CPT

## 2022-06-20 PROCEDURE — 6370000000 HC RX 637 (ALT 250 FOR IP): Performed by: INTERNAL MEDICINE

## 2022-06-20 PROCEDURE — 83735 ASSAY OF MAGNESIUM: CPT

## 2022-06-20 PROCEDURE — 99232 SBSQ HOSP IP/OBS MODERATE 35: CPT | Performed by: INTERNAL MEDICINE

## 2022-06-20 RX ORDER — DOXYCYCLINE HYCLATE 100 MG/1
100 CAPSULE ORAL EVERY 12 HOURS SCHEDULED
Status: DISCONTINUED | OUTPATIENT
Start: 2022-06-20 | End: 2022-06-24 | Stop reason: HOSPADM

## 2022-06-20 RX ORDER — DIAZEPAM 5 MG/1
5 TABLET ORAL EVERY 6 HOURS PRN
Status: DISCONTINUED | OUTPATIENT
Start: 2022-06-20 | End: 2022-06-24 | Stop reason: HOSPADM

## 2022-06-20 RX ADMIN — MONTELUKAST 10 MG: 10 TABLET, FILM COATED ORAL at 21:00

## 2022-06-20 RX ADMIN — DOXYCYCLINE HYCLATE 100 MG: 100 CAPSULE ORAL at 21:00

## 2022-06-20 RX ADMIN — LISINOPRIL 30 MG: 20 TABLET ORAL at 08:44

## 2022-06-20 RX ADMIN — APIXABAN 5 MG: 5 TABLET, FILM COATED ORAL at 21:00

## 2022-06-20 RX ADMIN — Medication 400 MG: at 08:45

## 2022-06-20 RX ADMIN — METOPROLOL SUCCINATE 100 MG: 100 TABLET, EXTENDED RELEASE ORAL at 22:57

## 2022-06-20 RX ADMIN — SODIUM CHLORIDE 12.5 MG/HR: 900 INJECTION, SOLUTION INTRAVENOUS at 16:24

## 2022-06-20 RX ADMIN — ATORVASTATIN CALCIUM 40 MG: 40 TABLET, FILM COATED ORAL at 21:00

## 2022-06-20 RX ADMIN — APIXABAN 5 MG: 5 TABLET, FILM COATED ORAL at 08:44

## 2022-06-20 RX ADMIN — SODIUM CHLORIDE, PRESERVATIVE FREE 5 ML: 5 INJECTION INTRAVENOUS at 08:54

## 2022-06-20 RX ADMIN — ASPIRIN 81 MG: 81 TABLET ORAL at 08:44

## 2022-06-20 RX ADMIN — AZITHROMYCIN MONOHYDRATE 500 MG: 250 TABLET ORAL at 08:44

## 2022-06-20 RX ADMIN — SODIUM CHLORIDE 15 MG/HR: 900 INJECTION, SOLUTION INTRAVENOUS at 10:25

## 2022-06-20 RX ADMIN — DIAZEPAM 5 MG: 5 TABLET ORAL at 12:01

## 2022-06-20 RX ADMIN — FENOFIBRATE 160 MG: 160 TABLET ORAL at 08:44

## 2022-06-20 RX ADMIN — METOPROLOL SUCCINATE 100 MG: 100 TABLET, EXTENDED RELEASE ORAL at 08:45

## 2022-06-20 RX ADMIN — CEFTRIAXONE 1000 MG: 1 INJECTION, POWDER, FOR SOLUTION INTRAMUSCULAR; INTRAVENOUS at 02:10

## 2022-06-20 RX ADMIN — FLUTICASONE PROPIONATE 1 SPRAY: 50 SPRAY, METERED NASAL at 08:50

## 2022-06-20 RX ADMIN — SODIUM CHLORIDE, PRESERVATIVE FREE 10 ML: 5 INJECTION INTRAVENOUS at 05:41

## 2022-06-20 ASSESSMENT — ENCOUNTER SYMPTOMS
GASTROINTESTINAL NEGATIVE: 1
SHORTNESS OF BREATH: 1
ALLERGIC/IMMUNOLOGIC NEGATIVE: 1
EYES NEGATIVE: 1

## 2022-06-20 ASSESSMENT — PAIN SCALES - GENERAL
PAINLEVEL_OUTOF10: 0

## 2022-06-20 NOTE — CONSULTS
[facility] CARDIOLOGY  [address] [facility], [address] [facility], [address] [place] [address]  PHONE: [phone]        22      NAME:  Kandice GRANT [facility] ERNA  : 1950  MRN: [id number]     Referring Cardiologist: Marbella Zuniga MD    Reason for Consultation: Atrial fibrillation    ASSESSMENT and PLAN:   1. Persistent atrial fibrillation ([facility] [address] s/p AF ablation 2022.       2. Essential hypertension, benign      3. Stage 3a chronic kidney disease ([facility] [address]      4. Atherosclerosis of native coronary artery of native heart without angina pectoris      5. LBBB (left bundle branch block)      6. LV dysfunction, EF EF now 30-35%, previously 45-50%     7. Atypical PNA     80year old male with a history of persistent AF/AFL s/p AF and AFL ablations. He remains in AF and he now is admitted with an atypical PNA. Ideally, it would be helpful to start a class III AAD. His EF is now reduced so Tikosyn is likely the best option. Reasonable to allow for infection to improve before initiating AAD therapy. -AF - s/p for AF ablation. Continue Eliquis and metoprolol. Start class III AAD when pt more stable/infection improves. -Atypical PNA - continue abx. -HFrEF  - continue GDMT. Thank you for allowing me to participate in the electrophysiologic care of Mr. Dylan Smith. Please contact me if any questions or concerns were to arise. Jj Shoemaker. Cecilia STILES, MS  Clinical Cardiac Electrophysiology  [address] [facility] [address] Cardiology  22  7:19 AM    ===================================================================  Chief Complant:    Chief Complaint   Patient presents with    Leg Swelling    Cough        Consultation is requested by No ref. provider found for evaluation of Leg Swelling and Cough    History:  Dylan Smith is a most pleasant 80 y.o. male with a past medical and cardiac history significant for HTN, LBBB and CAD with previous AFL ablation in 10/2021. He's had a previous flutter ablation.  After his flutter ablation, he went into persistent atrial fibrillation. He continues to have persistent AF after an AF ablation in 5/2022 with NYHA class II-III symptoms. He otherwise is compliant with medical therapy. He was admitted over the weekend with what appears to be an atpyical PNA and ongoing heart failure exacerbation now with a further reduced EF. He is on IV dilt for rate control and is now on abx.      His wife has severe back problems. Cardiac PMH: (Old records have been reviewed and summarized below)     AF ablation 5/17/2022         EKG:  (EKG has been independently visualized by me with interpretation below): Atrial fibrillation, LBBB, QRSd = 149, mild tachy. Monitor: 3/2022, persistent AF, 100%. Periods of bradycardia. ECHO: 6/18/2022'    Left Ventricle: Severely reduced left ventricular systolic function with a visually estimated EF of 30 - 35%. Left ventricle size is normal. Mildly increased wall thickness. Severe global hypokinesis present. Abnormal diastolic function.   Right Ventricle: Mildly reduced systolic function.   Mitral Valve: Mildly thickened leaflet. Mild annular calcification of the mitral valve. Moderate regurgitation. ERO 0.33 cm2. RVol 35 mL.   Interatrial Septum: Agitated saline study was negative with and without provocation. ECHO: 10/2021  ·   LA: Moderately dilated left atrium. Left Atrium volume index is 39 mL/m2. ·   TV: Mild tricuspid valve regurgitation is present. Right Ventricular Arterial Pressure (RVSP) is 37 mmHg. ·   LV: Estimated LVEF is 45 - 50%. Normal cavity size and wall thickness. Abnormal left ventricular septal motion consistent with left bundle branch block. Moderate (grade 2) left ventricular diastolic dysfunction. ·   MV: Mild mitral annular calcification. Mild to moderate mitral valve regurgitation is present.       Previous Heart Catheterization: n/a       Stress Test: n/a       DEVICE INTERROGATION: n/a      Past Medical History, Past Surgical History, Family history, Social History, and Medications were all reviewed with the patient today and updated as necessary.      Current Facility-Administered Medications   Medication Dose Route Frequency Provider Last Rate Last Admin    apixaban (ELIQUIS) tablet 5 mg  5 mg Oral BID Torito Almeida MD   5 mg at 06/19/22 2048    aspirin EC tablet 81 mg  81 mg Oral Daily Torito Almeida MD   81 mg at 06/19/22 0740    atorvastatin (LIPITOR) tablet 40 mg  40 mg Oral Nightly Torito Almeida MD   40 mg at 06/19/22 2048    azelastine HCl 0.15 % SOLN 1 spray- pt supply (Patient Supplied)  1 spray Nasal Daily Torito Almeida MD        fenofibrate (TRIGLIDE) tablet 160 mg  160 mg Oral Daily Torito Almeida MD   160 mg at 06/19/22 1015    fluticasone (FLONASE) 50 MCG/ACT nasal spray 1 spray  1 spray Each Nostril Daily Torito Almeida MD        lisinopril (PRINIVIL;ZESTRIL) tablet 30 mg  30 mg Oral Daily Torito Almeida MD        magnesium oxide (MAG-OX) tablet 400 mg  400 mg Oral Daily Torito Almeida MD   400 mg at 06/19/22 0740    metoprolol succinate (TOPROL XL) extended release tablet 100 mg  100 mg Oral BID Torito Almeida MD   100 mg at 06/19/22 2049    montelukast (SINGULAIR) tablet 10 mg  10 mg Oral Nightly Torito Almeida MD   10 mg at 06/19/22 2049    albuterol sulfate HFA (PROVENTIL;VENTOLIN;PROAIR) 108 (90 Base) MCG/ACT inhaler 1 puff  1 puff Inhalation Q4H PRN Torito Almeida MD        sodium chloride flush 0.9 % injection 5-40 mL  5-40 mL IntraVENous 2 times per day Torito Almeida MD   10 mL at 06/19/22 2048    sodium chloride flush 0.9 % injection 5-40 mL  5-40 mL IntraVENous PRN Torito Almeida MD   10 mL at 06/20/22 0541    0.9 % sodium chloride infusion   IntraVENous PRN Torito Almeida MD        ondansetron (ZOFRAN-ODT) disintegrating tablet 4 mg  4 mg Oral Q8H PRN Torito Almeida MD        Or    ondansetron [facility]) injection 4 mg  4 mg IntraVENous Q6H PRN Torito Almeida MD 4 mg at 06/19/22 1918    polyethylene glycol (GLYCOLAX) packet 17 g  17 g Oral Daily PRN Hailee Clement MD        acetaminophen (TYLENOL) tablet 650 mg  650 mg Oral Q6H PRN Hailee Clement MD        Or    acetaminophen (TYLENOL) suppository 650 mg  650 mg Rectal Q6H PRN Hailee Clement MD        azithromycin [facility]) tablet 500 mg  500 mg Oral Daily Hailee Clement MD        cefTRIAXone (ROCEPHIN) 1000 mg IVPB in NS 50ml minibag  1,000 mg IntraVENous Q24H Hailee Clement MD   Stopped at 06/20/22 0240    dilTIAZem 100 mg in sodium chloride 0.9 % 100 mL infusion (ADD-Alger)  2.5-15 mg/hr IntraVENous Continuous Moses Hallmark, APRN - CNP 7.5 mL/hr at 06/19/22 2138 7.5 mg/hr at 06/19/22 2138    guaiFENesin-dextromethorphan (ROBITUSSIN DM) 100-10 MG/5ML syrup 5 mL  5 mL Oral Q4H PRN Espiridion Cruise, DO   5 mL at 06/19/22 1854    temazepam (RESTORIL) capsule 15 mg  15 mg Oral Nightly PRN Espiridion Cruise, DO   15 mg at 06/19/22 1854     No Known Allergies    Past Medical History:   Diagnosis Date    Atherosclerosis of native coronary artery of native heart without angina pectoris 2/17/2020    Chronic renal disease, stage III ([facility] [address]     GERD (gastroesophageal reflux disease)     High cholesterol     Hypertension     LBBB (left bundle branch block) 1/28/2020     Past Surgical History:   Procedure Laterality Date    HEENT      left tube placement    ORTHOPEDIC SURGERY      L5 disc surgery    OTHER SURGICAL HISTORY      skin grafts on right hip and thigh     Family History   Problem Relation Age of Onset    Other Neg Hx     Post-op Cognitive Dysfunction Neg Hx     Emergence Delirium Neg Hx     Post-op Nausea/Vomiting Neg Hx     Delayed Awakening Neg Hx     Pseudochol.  Deficiency Neg Hx     Malig Hypertherm Neg Hx      Social History     Tobacco Use    Smoking status: Never Smoker    Smokeless tobacco: Never Used   Substance Use Topics    Alcohol use: No       ROS:  A comprehensive review of systems was performed with the pertinent positives and negatives as noted in the HPI in addition to:  Review of Systems   Constitutional: Negative. HENT: Negative. Eyes: Negative. Respiratory: Positive for shortness of breath. Cardiovascular: Negative. Gastrointestinal: Negative. Endocrine: Negative. Genitourinary: Negative. Musculoskeletal: Negative. Skin: Negative. Allergic/Immunologic: Negative. Neurological: Negative. Hematological: Negative. Psychiatric/Behavioral: Negative. All other systems reviewed and are negative. PHYSICAL EXAM:   /72   Pulse 77   Temp 98.7 °F (37.1 °C) (Oral)   Resp 20   Ht 5' 9\" (1.753 m)   Wt 233 lb 14.4 oz (106.1 kg)   SpO2 94%   BMI 34.54 kg/m²      Wt Readings from Last 3 Encounters:   06/20/22 233 lb 14.4 oz (106.1 kg)   06/17/22 221 lb 12.8 oz (100.6 kg)   05/17/22 226 lb (102.5 kg)     BP Readings from Last 3 Encounters:   06/20/22 119/72   06/17/22 110/74   05/12/22 (!) 138/100       Gen: Well appearing, well developed, no acute distress  Eyes: Pupils equal, round. Extraocular movements are intact  ENT: Oropharynx clear, no oral lesions, normal dentition  CV: S1S2, IRIR, no murmurs, rubs or gallops, normal JVD, no carotid bruits, normal distal pulses, no MARCUS  Pulm: Clear to auscultation bilaterally, no accessory muscle uses, no wheezes or rales  GI: Soft, NT, ND, +BS  Neuro: Alert and oriented, nonfocal  Psych: Appropriate affect  Skin: Normal color and skin turgor  MSK: Normal muscle bulk and tone    Medical problems and test results were reviewed with the patient today.

## 2022-06-20 NOTE — PROGRESS NOTES
Bedside and verbal shift change report received from  Shelli Ni, [address] [place] [address] (offgoing nurse). Report included the following information SBAR, Kardex, ED Summary, Procedure Summary, Intake/Output, MAR, Recent Results and Cardiac Rhythm Atrial fibrillation.      Dual skin assessment completed at bedside: WDL (list pertinent skin assessment findings)    Dual verification of gtts completed (name of gtts verified): Cardizem at 2.5mg/hr

## 2022-06-20 NOTE — PROGRESS NOTES
Chart reviewed by [facility].    No needs identified at this time    Will continue to follow for discharge planning needs  Please consult  if any new issues arise    Discharge plan is home with family assistance    ASSESSMENT NOTE    Attending Physician: Blanco Raygoza, DO  Admit Problem: Peripheral edema [R60.9]  Atrial fibrillation with RVR ([address] [I48.91]  Sepsis ([address] [A41.9]  Pneumonia of both lungs due to infectious organism, unspecified part of lung [J18.9]  Sepsis with acute hypoxic respiratory failure and septic shock, due to unspecified organism ([address] [A41.9, R65.21, J96.01]  Date/Time of Admission: 6/18/2022 11:52 PM  Problem List:  Patient Active Problem List   Diagnosis    Essential hypertension, benign    Atherosclerosis of native coronary artery of native heart without angina pectoris    Chronic systolic congestive heart failure (HCC)    Abnormal cardiovascular stress test    Atrial flutter (HCC)    LBBB (left bundle branch block)    Chronic renal disease, stage III (HCC)    Paroxysmal atrial fibrillation ([address]    Sepsis with acute hypoxic respiratory failure and septic shock ([address]    Edema of both lower legs    Atrial fibrillation with rapid ventricular response ([address]    High cholesterol    Bilateral pneumonia       Service Assessment  Patient Orientation     Cognition     History Provided By Medical Record   Primary Caregiver Self   Accompanied By/Relationship     Support Systems Spouse/Significant Other   Patient's Healthcare Decision Maker is: Legal Next of Anupam 70   PCP Verified by CM Yes   Last Visit to PCP Within last 3 months   Prior Functional Level Independent in ADLs/IADLs   Current Functional Level Independent in ADLs/IADLs   Can patient return to prior living arrangement Yes   Ability to make needs known: Good   Family able to assist with home care needs: Yes   Would you like for me to discuss the discharge plan with any other family members/significant others, and if so, who? Outside [address]     Partner SNF     Reason Why Partner SNF Not Chosen     Internal Comfort Care     Reason Outside [address] Discharge     [address] Provided? No   Mode of Transport at Discharge [address] Time of Discharge     Confirm Follow Up Transport       Condition of Participation: Discharge Planning  The plan for Transition of Care is related to the following treatment goals: no further needs   The Patient and/or Patient Representative was provided with a Choice of Provider? Name of the Patient Representative who was provided with the Choice of Provider and agrees with the Discharge Plan? The Patient and/or Patient Representative Agree with the Discharge Plan? Freedom of Choice list was provided with basic dialogue that supports the individualized plan of care/goals, treatment preferences, and shares the quality data associated with the providers?  No     Documentation for Discharge Appeal  Discharge Appealed by     Date notified by QIO of appeal request:     Time notified by QIO of appeal request:     Detailed Notice of Discharge given to:     Date Notice of Discharge given:     Time Notice of Discharge given:     Date records sent to [address]     Time records sent to [address]     Date Notified of Outcome     Time Notified of Outcome     Outcome of appeal           Nalini Huynh RN 06/20/22 3:57 PM

## 2022-06-20 NOTE — PROGRESS NOTES
Patient with only 150 ml of amauri urine since 0700. Bladder scan only showing 8 ml in bladder. Will continue to monitor.

## 2022-06-20 NOTE — ACP (ADVANCE CARE PLANNING)
Advance Care Planning     General Advance Care Planning (ACP) Conversation    Date of Conversation: 6/18/2022  Conducted with: Patient with Decision Making Capacity    Healthcare Decision Maker:  No healthcare decision makers have been documented. Click here to complete [address] including selection of the Healthcare Decision Maker Relationship (ie \"Primary\")      Content/Action Overview:     Has ACP document(s) on file - reflects the patient's care preferences  Reviewed DNR/DNI and patient elects Full Code (Attempt Resuscitation)        Length of Voluntary ACP Conversation in minutes:  <16 minutes (Non-Billable)    Alexandra Wagner RN

## 2022-06-20 NOTE — PROGRESS NOTES
Hospitalist Progress Note   Admit Date:  2022 11:52 PM   Name:  Rula Acosta   Age:  80 y.o. Sex:  male  :  1950   MRN:  [id number]   Room:  [facility]/    Presenting Complaint: Fevers, cough, and SOB  Reason(s) for Admission: Peripheral edema [R60.9]  Atrial fibrillation with RVR ([address] [I48.91]  Sepsis ([address] [A41.9]  Pneumonia of both lungs due to infectious organism, unspecified part of lung [J18.9]  Sepsis with acute hypoxic respiratory failure and septic shock, due to unspecified organism ([address] [A41.9, R65.21, J96.01]     Hospital Course & Interval History:   80 y.o. male with medical history of atrial fibrillation with scheduled outpatient cardioversion on , HTN, CKD presented to ED on 22 with worsening SOB and cough with associated fevers and chills x 3-4 days. States that he saw an outpatient doctor about 3 days prior, who checked a BNP and when it resulted ~300, he was started on PO lasix. Despite taking the Lasix, his SOB worsened. Upon ER evaluation, patient was tachycardic and hypotensive. CXR shows B infiltrates. WBC is 12. 6. pBNP is elevated at 3,566 and with BLE edema. Last, recent echo from 2022 shows mildly reduced systolic function with EF of 45-50%. He has known afib (and is currently in RVR), and is scheduled for CARDIOVERSION on Monday. He was given IVFs and antibiotics in the ER. His BP initially improved then dropped so he was admitted to the ICU on Phenylephrine and Cardizem gtt. Subjective/24hr Events (22):  SOB improved. Cough still persistent. Feels jittery or anxious today. Denies CP/palpitations. Denies F/C since admission, but \"when it happens I'm soaking wet. \" Denies N/V/D.       Assessment & Plan:     Principal Problem:    Septic shock ([address] (Resolved)  - Likely due to pneumonia and rapid Afib  - Was on pressors initially  - Now off pressors x 48 hours  - Continue Ceftriaxone + Azithromycin  - Not on IVFs with fluid overload  - Blood cultures NGTD  - Check UA  - Procal normal  - Lactic acid normal    Active Problems:    Atrial fibrillation with rapid ventricular response (HCC)  - Likely exacerbated by pneumonia and sepsis  - Continue Cardizem gtt  - Continue Eliquis  - Cardiology following  - Possible Tikosyn load and cardioversion soon      Bilateral pneumonia  - CXR with multilobar infilatrates  - Concerning for edema vs atypical pneumonia  - CT Chest with bilateral atypical pneumonia  - Continue Ceftriaxone + Azithromycin      Essential hypertension, benign  - Hypotension improving  - On Cardizem gtt  - Off pressors      Chronic systolic congestive heart failure (HCC)  - EF 45% on previos ECHO  - 6/19 ECHO now with EF 30%-35%  - Continue Lasix  - Cardiology following      Paroxysmal atrial fibrillation ([address]  - See above      Edema of both lower legs  - Due to fluid overload  - Continue IV Lasix      Atherosclerosis of native coronary artery of native heart without angina pectoris      LBBB (left bundle branch block)      Chronic renal disease, stage III (HCC)  - Creatinine at baseline      High cholesterol    Diet:  ADULT DIET;  Regular; Low Fat/Low Chol/High Fiber/2 gm Na  DVT PPx: Eliquis  Code status: Full Code    Hospital Problems:  Hospital Problems           Last Modified POA    * (Principal) Sepsis with acute hypoxic respiratory failure and septic shock ([address] 6/19/2022 Yes    Atrial fibrillation with rapid ventricular response ([address] 6/19/2022 Yes    Bilateral pneumonia 6/19/2022 Yes    Essential hypertension, benign 6/19/2022 Yes    Chronic systolic congestive heart failure ([address] 6/19/2022 Yes    Paroxysmal atrial fibrillation ([address] 6/19/2022 Yes    Edema of both lower legs 6/19/2022 Yes    Atherosclerosis of native coronary artery of native heart without angina pectoris 6/19/2022 Yes    LBBB (left bundle branch block) 6/19/2022 Yes    Chronic renal disease, stage III ([address] 6/19/2022 Yes    High cholesterol 6/19/2022 Yes Objective:     Patient Vitals for the past 24 hrs:   Temp Pulse Resp BP SpO2   06/20/22 0931 98.1 °F (36.7 °C) 77 20 110/70 95 %   06/20/22 0845 -- 82 -- 109/76 --   06/20/22 0805 -- 82 -- 109/76 --   06/20/22 0500 98.7 °F (37.1 °C) 77 20 119/72 94 %   06/20/22 0114 98.1 °F (36.7 °C) (!) 115 24 115/88 94 %   06/19/22 2100 98.7 °F (37.1 °C) 97 28 (!) 118/97 94 %   06/19/22 2048 -- (!) 137 -- 131/76 --   06/19/22 2045 -- (!) 121 (!) 31 -- --   06/19/22 2030 -- (!) 123 20 131/76 (!) 89 %   06/19/22 2015 -- (!) 124 -- -- --   06/19/22 2000 -- 94 -- (!) 129/94 --   06/19/22 1945 -- (!) 121 -- -- 91 %   06/19/22 1930 99.2 °F (37.3 °C) (!) 118 25 (!) 147/82 92 %   06/19/22 1915 -- (!) 110 30 -- --   06/19/22 1900 -- (!) 132 29 (!) 130/95 --   06/19/22 1830 -- (!) 121 16 -- (!) 83 %   06/19/22 1815 -- (!) 110 (!) 45 (!) 140/79 (!) 87 %   06/19/22 1800 -- (!) 124 (!) 38 109/76 (!) 86 %   06/19/22 1745 -- (!) 111 24 114/86 92 %   06/19/22 1730 -- (!) 118 (!) 112 116/79 91 %   06/19/22 1715 -- (!) 116 15 123/89 90 %   06/19/22 1700 -- (!) 105 29 109/76 94 %   06/19/22 1645 -- (!) 115 (!) 37 118/85 92 %   06/19/22 1630 -- (!) 113 27 114/78 92 %   06/19/22 1615 -- (!) 111 29 117/86 95 %   06/19/22 1600 -- (!) 109 (!) 35 131/83 90 %   06/19/22 1545 -- (!) 111 (!) 35 (!) 143/79 93 %   06/19/22 1530 -- (!) 112 (!) 34 129/68 92 %   06/19/22 1515 -- (!) 106 25 125/84 95 %   06/19/22 1500 97.9 °F (36.6 °C) (!) 101 26 111/83 92 %   06/19/22 1445 -- (!) 104 (!) 47 108/75 95 %   06/19/22 1430 -- (!) 102 28 121/69 94 %   06/19/22 1415 -- (!) 116 29 (!) 97/54 91 %   06/19/22 1400 -- (!) 110 (!) 37 118/83 96 %   06/19/22 1345 -- (!) 114 (!) 35 (!) 115/100 95 %   06/19/22 1330 -- (!) 112 (!) 33 110/87 95 %   06/19/22 1315 -- (!) 107 16 114/84 (!) 79 %       Oxygen Therapy  SpO2: 95 %  Pulse Oximetry Type: Continuous  Pulse via Oximetry: 112 beats per minute  SPO2 Low Alarm Limit POX: 89  Pulse Oximeter Device Mode: Continuous  Pulse Oximeter Device Location: Left,Finger  O2 Device: Nasal cannula  Skin Assessment: Clean, dry, & intact  Skin Protection for O2 Device: No  O2 Flow Rate (L/min): 2 L/min    Estimated body mass index is 34.54 kg/m² as calculated from the following:    Height as of this encounter: 5' 9\" (1.753 m). Weight as of this encounter: 233 lb 14.4 oz (106.1 kg). Intake/Output Summary (Last 24 hours) at 6/20/2022 1306  Last data filed at 6/20/2022 1213  Gross per 24 hour   Intake 502.5 ml   Output 600 ml   Net -97.5 ml         Physical Exam:   Blood pressure 110/70, pulse 77, temperature 98.1 °F (36.7 °C), temperature source Oral, resp. rate 20, height 5' 9\" (1.753 m), weight 233 lb 14.4 oz (106.1 kg), SpO2 95 %. General:    Well nourished. No overt distress  Head:  Normocephalic, atraumatic  Eyes:  Sclerae appear normal.  Pupils equally round. ENT:  Nares appear normal, no drainage. Moist oral mucosa  Neck:  No restricted ROM. Trachea midline   CV:   Tachy, irregular. No m/r/g. No jugular venous distension. Lungs:   CTAB. No wheezing, rhonchi, or rales. Respirations even, unlabored  Abdomen: Bowel sounds present. Soft, nontender, nondistended. Extremities: No cyanosis or clubbing. No edema  Skin:     No rashes and normal coloration. Warm and dry. Neuro:  CN II-XII grossly intact. Sensation intact. A&Ox3  Psych:  Normal mood and affect.       I have reviewed ordered lab tests and independently visualized imaging below:    Recent Labs:  Recent Results (from the past 48 hour(s))   EKG 12 Lead    Collection Time: 06/18/22 11:05 PM   Result Value Ref Range    Ventricular Rate 131 BPM    QRS Duration 144 ms    Q-T Interval 302 ms    QTc Calculation (Bazett) 445 ms    R Axis 83 degrees    T Axis -74 degrees    Diagnosis       Atrial fibrillation with rapid ventricular response   CBC with Auto Differential    Collection Time: 06/18/22 11:20 PM   Result Value Ref Range    WBC 12.6 (H) 4.3 - 11.1 K/uL    RBC 4.16 (L) 4.23 - 5.6 M/uL    Hemoglobin 12.0 (L) 13.6 - 17.2 g/dL    Hematocrit 37.5 (L) 41.1 - 50.3 %    MCV 90.1 79.6 - 97.8 FL    MCH 28.8 26.1 - 32.9 PG    MCHC 32.0 31.4 - 35.0 g/dL    RDW 14.2 11.9 - 14.6 %    Platelets 131 428 - 159 K/uL    MPV 9.3 (L) 9.4 - 12.3 FL    nRBC 0.00 0.0 - 0.2 K/uL    Differential Type AUTOMATED      Seg Neutrophils 84 (H) 43 - 78 %    Lymphocytes 9 (L) 13 - 44 %    Monocytes 5 4.0 - 12.0 %    Eosinophils % 1 0.5 - 7.8 %    Basophils 0 0.0 - 2.0 %    Immature Granulocytes 1 0.0 - 5.0 %    Segs Absolute 10.7 (H) 1.7 - 8.2 K/UL    Absolute Lymph # 1.1 0.5 - 4.6 K/UL    Absolute Mono # 0.6 0.1 - 1.3 K/UL    Absolute Eos # 0.1 0.0 - 0.8 K/UL    Basophils Absolute 0.0 0.0 - 0.2 K/UL    Absolute Immature Granulocyte 0.1 0.0 - 0.5 K/UL   Comprehensive Metabolic Panel    Collection Time: 06/18/22 11:20 PM   Result Value Ref Range    Sodium 141 136 - 145 mmol/L    Potassium 4.6 3.5 - 5.1 mmol/L    Chloride 107 98 - 107 mmol/L    CO2 25 21 - 32 mmol/L    Anion Gap 9 7 - 16 mmol/L    Glucose 209 (H) 65 - 100 mg/dL    BUN 23 8 - 23 MG/DL    CREATININE 1.40 0.8 - 1.5 MG/DL    GFR African American >60 >60 ml/min/1.73m2    GFR Non- 53 (L) >60 ml/min/1.73m2    Calcium 8.7 8.3 - 10.4 MG/DL    Total Bilirubin 0.6 0.2 - 1.1 MG/DL    ALT 70 (H) 12 - 65 U/L    AST 42 (H) 15 - 37 U/L    Alk Phosphatase 56 50 - 136 U/L    Total Protein 6.1 (L) 6.3 - 8.2 g/dL    Albumin 3.0 (L) 3.2 - 4.6 g/dL    Globulin 3.1 2.3 - 3.5 g/dL    Albumin/Globulin Ratio 1.0 (L) 1.2 - 3.5     Magnesium    Collection Time: 06/18/22 11:20 PM   Result Value Ref Range    Magnesium 1.9 1.8 - 2.4 mg/dL   Troponin    Collection Time: 06/18/22 11:20 PM   Result Value Ref Range    Troponin, High Sensitivity 35.3 (H) 0 - 14 pg/mL   proBNP, N-TERMINAL    Collection Time: 06/18/22 11:20 PM   Result Value Ref Range    NT Pro-BNP 3,566 (H) 5 - 125 PG/ML   Culture, Blood 1    Collection Time: 06/19/22  1:11 AM    Specimen: Blood   Result Value Ref Range    Special Requests NO SPECIAL REQUESTS  LEFT  Antecubital        Culture NO GROWTH 1 DAY     Culture, Blood 1    Collection Time: 06/19/22  1:11 AM    Specimen: Blood   Result Value Ref Range    Special Requests NO SPECIAL REQUESTS  RIGHT  HAND        Culture NO GROWTH 1 DAY     Lactic Acid    Collection Time: 06/19/22  1:11 AM   Result Value Ref Range    Lactic Acid, Plasma 1.6 0.4 - 2.0 MMOL/L   Procalcitonin    Collection Time: 06/19/22  1:11 AM   Result Value Ref Range    Procalcitonin <0.05 0.00 - 0.49 ng/mL   COVID-19, Rapid    Collection Time: 06/19/22  1:11 AM    Specimen: Nasopharyngeal   Result Value Ref Range    Source NASAL      SARS-CoV-2, Rapid Not detected NOTD     POCT Glucose    Collection Time: 06/19/22  5:00 AM   Result Value Ref Range    POC Glucose 226 (H) 65 - 100 mg/dL    Performed by: GBS    Basic Metabolic Panel w/ Reflex to MG    Collection Time: 06/19/22  6:27 AM   Result Value Ref Range    Sodium 141 138 - 145 mmol/L    Potassium 4.4 3.5 - 5.1 mmol/L    Chloride 108 (H) 98 - 107 mmol/L    CO2 25 21 - 32 mmol/L    Anion Gap 8 7 - 16 mmol/L    Glucose 170 (H) 65 - 100 mg/dL    BUN 23 8 - 23 MG/DL    CREATININE 1.30 0.8 - 1.5 MG/DL    GFR African American >60 >60 ml/min/1.73m2    GFR Non- 58 (L) >60 ml/min/1.73m2    Calcium 8.1 (L) 8.3 - 10.4 MG/DL   TSH    Collection Time: 06/19/22  6:27 AM   Result Value Ref Range    TSH, 3RD GENERATION 2.160 0.358 - 3.740 uIU/mL   Transthoracic echocardiogram (TTE) complete with contrast, bubble, strain, and 3D PRN    Collection Time: 06/19/22 10:00 AM   Result Value Ref Range    LA Minor Axis 5.9 cm    LA Major Axis 6.0 cm    LA Area 2C 24.9 cm2    LA Area 4C 18.6 cm2    LA Volume BP 62 (A) 18 - 58 mL    LA Diameter 4.9 cm    RA Area 4C 22.8 cm2    RA Volume 71 ml    AV Mean Velocity 0.7 m/s    AV Mean Gradient 2 mmHg    AV VTI 16.7 cm    AV Peak Velocity 1.1 m/s    AV Peak Gradient 5 mmHg    AV 8.0 (L) 8.3 - 10.4 MG/DL   CBC with Auto Differential    Collection Time: 06/20/22  5:44 AM   Result Value Ref Range    WBC 12.3 (H) 4.3 - 11.1 K/uL    RBC 4.02 (L) 4.23 - 5.6 M/uL    Hemoglobin 11.5 (L) 13.6 - 17.2 g/dL    Hematocrit 36.5 (L) 41.1 - 50.3 %    MCV 90.8 79.6 - 97.8 FL    MCH 28.6 26.1 - 32.9 PG    MCHC 31.5 31.4 - 35.0 g/dL    RDW 14.3 11.9 - 14.6 %    Platelets 488 858 - 971 K/uL    MPV 9.3 (L) 9.4 - 12.3 FL    nRBC 0.02 0.0 - 0.2 K/uL    Differential Type AUTOMATED      Seg Neutrophils 79 (H) 43 - 78 %    Lymphocytes 15 13 - 44 %    Monocytes 5 4.0 - 12.0 %    Eosinophils % 0 (L) 0.5 - 7.8 %    Basophils 0 0.0 - 2.0 %    Immature Granulocytes 1 0.0 - 5.0 %    Segs Absolute 9.7 (H) 1.7 - 8.2 K/UL    Absolute Lymph # 1.9 0.5 - 4.6 K/UL    Absolute Mono # 0.6 0.1 - 1.3 K/UL    Absolute Eos # 0.0 0.0 - 0.8 K/UL    Basophils Absolute 0.0 0.0 - 0.2 K/UL    Absolute Immature Granulocyte 0.1 0.0 - 0.5 K/UL   Magnesium    Collection Time: 06/20/22  5:44 AM   Result Value Ref Range    Magnesium 2.2 1.8 - 2.4 mg/dL       Other Studies:  XR CHEST PORTABLE    Result Date: 6/18/2022  EXAM: XR CHEST PORTABLE HISTORY: Shortness of Breath. TECHNIQUE: Frontal chest. COMPARISON: None available. FINDINGS: The cardiac silhouette, mediastinum, and pulmonary vasculature are within normal limits. There is no pneumothorax. Infiltrates are seen in the right upper and lower lung and in the left lower lung. There is pleural thickening along the right chest wall. There there is no appreciable pleural effusion No significant osseous abnormalities are observed. Bilateral lung infiltrates as described above may represent pneumonia.        Current Meds:  Current Facility-Administered Medications   Medication Dose Route Frequency    diazePAM (VALIUM) tablet 5 mg  5 mg Oral Q6H PRN    apixaban (ELIQUIS) tablet 5 mg  5 mg Oral BID    aspirin EC tablet 81 mg  81 mg Oral Daily    atorvastatin (LIPITOR) tablet 40 mg  40 mg Oral Nightly    azelastine HCl 0.15 % SOLN 1 spray- pt supply (Patient Supplied)  1 spray Nasal Daily    fenofibrate (TRIGLIDE) tablet 160 mg  160 mg Oral Daily    fluticasone (FLONASE) 50 MCG/ACT nasal spray 1 spray  1 spray Each Nostril Daily    lisinopril (PRINIVIL;ZESTRIL) tablet 30 mg  30 mg Oral Daily    magnesium oxide (MAG-OX) tablet 400 mg  400 mg Oral Daily    metoprolol succinate (TOPROL XL) extended release tablet 100 mg  100 mg Oral BID    montelukast (SINGULAIR) tablet 10 mg  10 mg Oral Nightly    albuterol sulfate HFA (PROVENTIL;VENTOLIN;PROAIR) 108 (90 Base) MCG/ACT inhaler 1 puff  1 puff Inhalation Q4H PRN    sodium chloride flush 0.9 % injection 5-40 mL  5-40 mL IntraVENous 2 times per day    sodium chloride flush 0.9 % injection 5-40 mL  5-40 mL IntraVENous PRN    0.9 % sodium chloride infusion   IntraVENous PRN    ondansetron (ZOFRAN-ODT) disintegrating tablet 4 mg  4 mg Oral Q8H PRN    Or    ondansetron (ZOFRAN) injection 4 mg  4 mg IntraVENous Q6H PRN    polyethylene glycol (GLYCOLAX) packet 17 g  17 g Oral Daily PRN    acetaminophen (TYLENOL) tablet 650 mg  650 mg Oral Q6H PRN    Or    acetaminophen (TYLENOL) suppository 650 mg  650 mg Rectal Q6H PRN    azithromycin (ZITHROMAX) tablet 500 mg  500 mg Oral Daily    cefTRIAXone (ROCEPHIN) 1000 mg IVPB in NS 50ml minibag  1,000 mg IntraVENous Q24H    dilTIAZem 100 mg in sodium chloride 0.9 % 100 mL infusion (ADD-[place])  2.5-15 mg/hr IntraVENous Continuous    guaiFENesin-dextromethorphan (ROBITUSSIN DM) 100-10 MG/5ML syrup 5 mL  5 mL Oral Q4H PRN    temazepam (RESTORIL) capsule 15 mg  15 mg Oral Nightly PRN       Discharge Plan:     Location: Home  Days: 2-3  PPD Ordered: N/A    Signed:  Prema Mendez DO    Part of this note may have been written by using a voice dictation software. The note has been proof read but may still contain some grammatical/other typographical errors.

## 2022-06-20 NOTE — PROGRESS NOTES
am  6/20/2022 6:31 AM    Admit Date: 6/18/2022    Admit Diagnosis: Peripheral edema [R60.9]  Atrial fibrillation with RVR (HCC) [I48.91]  Sepsis ([facility] [A41.9]  Pneumonia of both lungs due to infectious organism, unspecified part of lung [J18.9]  Sepsis with acute hypoxic respiratory failure and septic shock, due to unspecified organism ([address] [A41.9, R65.21, J96.01]      Subjective:    Patient : Patient seen appears hemodynamically stable today is rate controlled but continues in atrial fibrillation    Objective:    /72   Pulse 77   Temp 98.7 °F (37.1 °C) (Oral)   Resp 20   Ht 5' 9\" (1.753 m)   Wt 233 lb 14.4 oz (106.1 kg)   SpO2 94%   BMI 34.54 kg/m²     ROS:  General ROS: negative for - chills  Hematological and Lymphatic ROS: negative for - blood clots or jaundice  Respiratory ROS: no cough, shortness of breath, or wheezing  Cardiovascular ROS: no chest pain or dyspnea on exertion  Gastrointestinal ROS: no abdominal pain, change in bowel habits, or black or bloody stools  Neurological ROS: no TIA or stroke symptoms    Physical Exam:      Physical Examination: General appearance - Appearance: alert, well appearing, and in no distress.    Neck/lymph - supple, no significant adenopathy  Chest/CV - clear to auscultation, no wheezes, rales or rhonchi, symmetric air entry  Heart - irregularly irregular rhythm with rate 74  Abdomen/GI - soft, nontender, nondistended, no masses or organomegaly   Musculoskeletal - no joint tenderness, deformity or swelling  Extremities - peripheral pulses normal, no pedal edema, no clubbing or cyanosis  Skin - normal coloration and turgor, no rashes, no suspicious skin lesions noted    Current Facility-Administered Medications   Medication Dose Route Frequency    apixaban (ELIQUIS) tablet 5 mg  5 mg Oral BID    aspirin EC tablet 81 mg  81 mg Oral Daily    atorvastatin (LIPITOR) tablet 40 mg  40 mg Oral Nightly    azelastine HCl 0.15 % SOLN 1 spray- pt supply (Patient Supplied)  1 spray Nasal Daily    fenofibrate (TRIGLIDE) tablet 160 mg  160 mg Oral Daily    fluticasone (FLONASE) 50 MCG/ACT nasal spray 1 spray  1 spray Each Nostril Daily    lisinopril (PRINIVIL;ZESTRIL) tablet 30 mg  30 mg Oral Daily    magnesium oxide (MAG-OX) tablet 400 mg  400 mg Oral Daily    metoprolol succinate (TOPROL XL) extended release tablet 100 mg  100 mg Oral BID    montelukast (SINGULAIR) tablet 10 mg  10 mg Oral Nightly    albuterol sulfate HFA (PROVENTIL;VENTOLIN;PROAIR) 108 (90 Base) MCG/ACT inhaler 1 puff  1 puff Inhalation Q4H PRN    sodium chloride flush 0.9 % injection 5-40 mL  5-40 mL IntraVENous 2 times per day    sodium chloride flush 0.9 % injection 5-40 mL  5-40 mL IntraVENous PRN    0.9 % sodium chloride infusion   IntraVENous PRN    ondansetron (ZOFRAN-ODT) disintegrating tablet 4 mg  4 mg Oral Q8H PRN    Or    ondansetron (ZOFRAN) injection 4 mg  4 mg IntraVENous Q6H PRN    polyethylene glycol (GLYCOLAX) packet 17 g  17 g Oral Daily PRN    acetaminophen (TYLENOL) tablet 650 mg  650 mg Oral Q6H PRN    Or    acetaminophen (TYLENOL) suppository 650 mg  650 mg Rectal Q6H PRN    azithromycin (ZITHROMAX) tablet 500 mg  500 mg Oral Daily    cefTRIAXone (ROCEPHIN) 1000 mg IVPB in NS 50ml minibag  1,000 mg IntraVENous Q24H    dilTIAZem 100 mg in sodium chloride 0.9 % 100 mL infusion (ADD-[place])  2.5-15 mg/hr IntraVENous Continuous    guaiFENesin-dextromethorphan (ROBITUSSIN DM) 100-10 MG/5ML syrup 5 mL  5 mL Oral Q4H PRN    temazepam (RESTORIL) capsule 15 mg  15 mg Oral Nightly PRN       Data Review: data included in this note has been independently reviewed by the author     TELEMETRY: WILD barraza    Assessment/Plan:     Patient Active Problem List   Diagnosis    Essential hypertension, benign    Atherosclerosis of native coronary artery of native heart without angina pectoris    Chronic systolic congestive heart failure (HCC)    Abnormal cardiovascular stress test    Atrial flutter (HCC)    LBBB (left bundle branch block)    Chronic renal disease, stage III (HCC)    Paroxysmal atrial fibrillation (HCC)    Sepsis with acute hypoxic respiratory failure and septic shock (HCC)    Edema of both lower legs    Atrial fibrillation with rapid ventricular response (HCC)    High cholesterol    Bilateral pneumonia     PLAN    1. Persistent Afib: rate control for now, dilt gtt for now. On PO BB. On NOAC, follow labs, follow K and Mg. Apparently been in Afib 2 weeks after ablation. Last plan for EP was for sotalol induction. 2. Sepsis:  pressor support, follow. On abx. 3. Checking echo, follow EF with RVR. Adjust meds for diuresis as he gets better. 4. When his current sepsis/infection is appropriately addressed. Initiation of antiarrhythmic drug therapy can be considered.   Consider electrophysiology consult  Quirino Guthrie MD

## 2022-06-20 NOTE — PROGRESS NOTES
Bedside and Verbal shift change report to be given to Jory Marina (oncoming nurse) by Carmen Burk and Neno Samayoa RN (offgoing nurse). Report included the following information Nurse Handoff Report, Adult Overview, Intake/Output and MAR.

## 2022-06-20 NOTE — PROGRESS NOTES
Date of Outreach Update:  Gavi Jones was seen and assessed. @[facility]([id number])@  Vitals:    06/19/22 2045 06/19/22 2048 06/19/22 2100 06/20/22 0114   BP:  131/76 (!) 118/97 115/88   Pulse: (!) 121 (!) 137 97 (!) 115   Resp: (!) 31  28 24   Temp:   98.7 °F (37.1 °C) 98.1 °F (36.7 °C)   TempSrc:   Oral Oral   SpO2:   94% 94%   Weight:   233 lb 6.4 oz (105.9 kg)    Height:   5' 9\" (1.753 m)          Pain Assessment  @[facility]([id number])@               Previous Outreach assessment has been reviewed. There have been no significant clinical changes since the completion of the last dated Outreach assessment. Will continue to follow up per outreach protocol.     Signed By:   Jack Escamilla RN    June 20, 2022 3:32 AM

## 2022-06-20 NOTE — PROGRESS NOTES
[facility] CRITICAL CARE OUTREACH NURSE PROGRESS REPORT      SUBJECTIVE: Called to assess patient secondary to transferring out of ICU.      @[facility]([id number])@  Vitals:    06/19/22 2045 06/19/22 2048 06/19/22 2100 06/20/22 0114   BP:  131/76 (!) 118/97 115/88   Pulse: (!) 121 (!) 137 97 (!) 115   Resp: (!) 31  28 24   Temp:   98.7 °F (37.1 °C) 98.1 °F (36.7 °C)   TempSrc:   Oral Oral   SpO2:   94% 94%   Weight:   233 lb 6.4 oz (105.9 kg)    Height:   5' 9\" (1.753 m)         LAB DATA:    Recent Labs     06/18/22 2320 06/19/22  0627    141   K 4.6 4.4    108*   CO2 25 25   BUN 23 23   GFRAA >60 >60   MG 1.9  --    GLOB 3.1  --    ALT 70*  --         Recent Labs     06/18/22 2320   WBC 12.6*   HGB 12.0*   HCT 37.5*             OBJECTIVE: On arrival to room, I found patient to be lying in bed with eyes open. Pain Assessment  @[facility]([id number])@             @[facility]([id number])@             @[facility]([id number])@              ASSESSMENT:  Patient appears restless in bed. Able to answer orientation questions but appears forgetful. Cardizem gtt @ 7.5 with heart rate bouncing in the low 100s. PLAN:  Planned cardioversion scheduled for today. Will continue to follow per outreach protocol.

## 2022-06-20 NOTE — PROGRESS NOTES
Date of Outreach Update:  Sid Cancer was seen and assessed. Vitals:    06/20/22 0805 06/20/22 0845 06/20/22 0931 06/20/22 1311   BP: 109/76 109/76 110/70 139/79   Pulse: 82 82 77 76   Resp:   20 20   Temp:   98.1 °F (36.7 °C) 97.9 °F (36.6 °C)   TempSrc:   Oral Oral   SpO2:   95%    Weight:       Height:              Previous Outreach assessment has been reviewed. There have been no significant clinical changes since the completion of the last dated Outreach assessment. Will continue to follow up per outreach protocol.     Signed By:   Helga Nieto RN    June 20, 2022 3:54 PM

## 2022-06-20 NOTE — PROGRESS NOTES
Patient very restless in bed, turned quickly, pulled on IV tubing, which then jerked Saline well out. Lt AC iv site bleeding, gauze drsg applied, IV cath tip smooth and intact.

## 2022-06-20 NOTE — PROGRESS NOTES
Pt resting in bed NAD, restless. Staff at bedside. Respirations even and unlabored. Pt with no complaints at this time.

## 2022-06-21 LAB
ANION GAP SERPL CALC-SCNC: 4 MMOL/L (ref 7–16)
BASOPHILS # BLD: 0 K/UL (ref 0–0.2)
BASOPHILS NFR BLD: 0 % (ref 0–2)
BUN SERPL-MCNC: 26 MG/DL (ref 8–23)
CALCIUM SERPL-MCNC: 8.2 MG/DL (ref 8.3–10.4)
CHLORIDE SERPL-SCNC: 104 MMOL/L (ref 98–107)
CO2 SERPL-SCNC: 31 MMOL/L (ref 21–32)
CREAT SERPL-MCNC: 1.3 MG/DL (ref 0.8–1.5)
DIFFERENTIAL METHOD BLD: ABNORMAL
EKG ATRIAL RATE: 57 BPM
EKG ATRIAL RATE: 60 BPM
EKG DIAGNOSIS: NORMAL
EKG DIAGNOSIS: NORMAL
EKG P AXIS: 34 DEGREES
EKG P-R INTERVAL: 184 MS
EKG Q-T INTERVAL: 466 MS
EKG Q-T INTERVAL: 602 MS
EKG QRS DURATION: 150 MS
EKG QRS DURATION: 150 MS
EKG QTC CALCULATION (BAZETT): 499 MS
EKG QTC CALCULATION (BAZETT): 585 MS
EKG R AXIS: 40 DEGREES
EKG R AXIS: 89 DEGREES
EKG T AXIS: 133 DEGREES
EKG T AXIS: 168 DEGREES
EKG VENTRICULAR RATE: 57 BPM
EKG VENTRICULAR RATE: 69 BPM
EOSINOPHIL # BLD: 0.1 K/UL (ref 0–0.8)
EOSINOPHIL NFR BLD: 1 % (ref 0.5–7.8)
ERYTHROCYTE [DISTWIDTH] IN BLOOD BY AUTOMATED COUNT: 14 % (ref 11.9–14.6)
GLUCOSE SERPL-MCNC: 136 MG/DL (ref 65–100)
HCT VFR BLD AUTO: 36.6 % (ref 41.1–50.3)
HGB BLD-MCNC: 11.5 G/DL (ref 13.6–17.2)
IMM GRANULOCYTES # BLD AUTO: 0.1 K/UL (ref 0–0.5)
IMM GRANULOCYTES NFR BLD AUTO: 1 % (ref 0–5)
LYMPHOCYTES # BLD: 1.2 K/UL (ref 0.5–4.6)
LYMPHOCYTES NFR BLD: 13 % (ref 13–44)
MCH RBC QN AUTO: 28.3 PG (ref 26.1–32.9)
MCHC RBC AUTO-ENTMCNC: 31.4 G/DL (ref 31.4–35)
MCV RBC AUTO: 90.1 FL (ref 79.6–97.8)
MONOCYTES # BLD: 0.4 K/UL (ref 0.1–1.3)
MONOCYTES NFR BLD: 4 % (ref 4–12)
NEUTS SEG # BLD: 7.4 K/UL (ref 1.7–8.2)
NEUTS SEG NFR BLD: 81 % (ref 43–78)
NRBC # BLD: 0 K/UL (ref 0–0.2)
PLATELET # BLD AUTO: 232 K/UL (ref 150–450)
PMV BLD AUTO: 9.2 FL (ref 9.4–12.3)
POTASSIUM SERPL-SCNC: 4.5 MMOL/L (ref 3.5–5.1)
RBC # BLD AUTO: 4.06 M/UL (ref 4.23–5.6)
SODIUM SERPL-SCNC: 139 MMOL/L (ref 138–145)
WBC # BLD AUTO: 9.2 K/UL (ref 4.3–11.1)

## 2022-06-21 PROCEDURE — 94760 N-INVAS EAR/PLS OXIMETRY 1: CPT

## 2022-06-21 PROCEDURE — 80048 BASIC METABOLIC PNL TOTAL CA: CPT

## 2022-06-21 PROCEDURE — 93005 ELECTROCARDIOGRAM TRACING: CPT | Performed by: INTERNAL MEDICINE

## 2022-06-21 PROCEDURE — 1100000003 HC PRIVATE W/ TELEMETRY

## 2022-06-21 PROCEDURE — 36415 COLL VENOUS BLD VENIPUNCTURE: CPT

## 2022-06-21 PROCEDURE — 93005 ELECTROCARDIOGRAM TRACING: CPT | Performed by: NURSE PRACTITIONER

## 2022-06-21 PROCEDURE — 2580000003 HC RX 258: Performed by: FAMILY MEDICINE

## 2022-06-21 PROCEDURE — 2700000000 HC OXYGEN THERAPY PER DAY

## 2022-06-21 PROCEDURE — 85025 COMPLETE CBC W/AUTO DIFF WBC: CPT

## 2022-06-21 PROCEDURE — 6370000000 HC RX 637 (ALT 250 FOR IP): Performed by: INTERNAL MEDICINE

## 2022-06-21 PROCEDURE — 99232 SBSQ HOSP IP/OBS MODERATE 35: CPT | Performed by: INTERNAL MEDICINE

## 2022-06-21 PROCEDURE — 6370000000 HC RX 637 (ALT 250 FOR IP): Performed by: FAMILY MEDICINE

## 2022-06-21 PROCEDURE — 2580000003 HC RX 258: Performed by: NURSE PRACTITIONER

## 2022-06-21 PROCEDURE — 2500000003 HC RX 250 WO HCPCS: Performed by: NURSE PRACTITIONER

## 2022-06-21 RX ORDER — DOFETILIDE 0.25 MG/1
250 CAPSULE ORAL EVERY 12 HOURS SCHEDULED
Status: DISCONTINUED | OUTPATIENT
Start: 2022-06-21 | End: 2022-06-24 | Stop reason: HOSPADM

## 2022-06-21 RX ORDER — DOFETILIDE 0.5 MG/1
500 CAPSULE ORAL EVERY 12 HOURS SCHEDULED
Status: DISCONTINUED | OUTPATIENT
Start: 2022-06-21 | End: 2022-06-21

## 2022-06-21 RX ORDER — POTASSIUM CHLORIDE 7.45 MG/ML
10 INJECTION INTRAVENOUS PRN
Status: DISCONTINUED | OUTPATIENT
Start: 2022-06-21 | End: 2022-06-24 | Stop reason: HOSPADM

## 2022-06-21 RX ORDER — POTASSIUM CHLORIDE 29.8 MG/ML
20 INJECTION INTRAVENOUS PRN
Status: DISCONTINUED | OUTPATIENT
Start: 2022-06-21 | End: 2022-06-24 | Stop reason: HOSPADM

## 2022-06-21 RX ORDER — MAGNESIUM SULFATE IN WATER 40 MG/ML
2000 INJECTION, SOLUTION INTRAVENOUS PRN
Status: DISCONTINUED | OUTPATIENT
Start: 2022-06-21 | End: 2022-06-24 | Stop reason: HOSPADM

## 2022-06-21 RX ADMIN — SODIUM CHLORIDE, PRESERVATIVE FREE 10 ML: 5 INJECTION INTRAVENOUS at 21:19

## 2022-06-21 RX ADMIN — APIXABAN 5 MG: 5 TABLET, FILM COATED ORAL at 21:19

## 2022-06-21 RX ADMIN — SODIUM CHLORIDE, PRESERVATIVE FREE 5 ML: 5 INJECTION INTRAVENOUS at 08:23

## 2022-06-21 RX ADMIN — METOPROLOL SUCCINATE 100 MG: 100 TABLET, EXTENDED RELEASE ORAL at 21:19

## 2022-06-21 RX ADMIN — ASPIRIN 81 MG: 81 TABLET ORAL at 08:21

## 2022-06-21 RX ADMIN — METOPROLOL SUCCINATE 100 MG: 100 TABLET, EXTENDED RELEASE ORAL at 08:21

## 2022-06-21 RX ADMIN — ATORVASTATIN CALCIUM 40 MG: 40 TABLET, FILM COATED ORAL at 21:19

## 2022-06-21 RX ADMIN — AZELASTINE HCL 1 SPRAY: 205.5 SPRAY NASAL at 08:29

## 2022-06-21 RX ADMIN — MONTELUKAST 10 MG: 10 TABLET, FILM COATED ORAL at 21:18

## 2022-06-21 RX ADMIN — DOFETILIDE 250 MCG: 0.25 CAPSULE ORAL at 21:30

## 2022-06-21 RX ADMIN — SODIUM CHLORIDE 10 MG/HR: 900 INJECTION, SOLUTION INTRAVENOUS at 10:48

## 2022-06-21 RX ADMIN — SODIUM CHLORIDE 10 MG/HR: 900 INJECTION, SOLUTION INTRAVENOUS at 01:19

## 2022-06-21 RX ADMIN — Medication 400 MG: at 08:23

## 2022-06-21 RX ADMIN — LISINOPRIL 30 MG: 20 TABLET ORAL at 08:17

## 2022-06-21 RX ADMIN — DIAZEPAM 5 MG: 5 TABLET ORAL at 14:03

## 2022-06-21 RX ADMIN — FENOFIBRATE 160 MG: 160 TABLET ORAL at 08:23

## 2022-06-21 RX ADMIN — DOXYCYCLINE HYCLATE 100 MG: 100 CAPSULE ORAL at 08:22

## 2022-06-21 RX ADMIN — DOFETILIDE 500 MCG: 0.5 CAPSULE ORAL at 08:31

## 2022-06-21 RX ADMIN — APIXABAN 5 MG: 5 TABLET, FILM COATED ORAL at 08:18

## 2022-06-21 RX ADMIN — DOXYCYCLINE HYCLATE 100 MG: 100 CAPSULE ORAL at 21:19

## 2022-06-21 ASSESSMENT — PAIN SCALES - GENERAL
PAINLEVEL_OUTOF10: 0

## 2022-06-21 NOTE — PROGRESS NOTES
[facility] CARDIOLOGY PROGRESS NOTE           6/21/2022 7:24 AM    Admit Date: 6/18/2022    Admit Diagnosis: Peripheral edema [R60.9]  Atrial fibrillation with RVR (HCC) [I48.91]  Sepsis ([address] [A41.9]  Pneumonia of both lungs due to infectious organism, unspecified part of lung [J18.9]  Sepsis with acute hypoxic respiratory failure and septic shock, due to unspecified organism ([address] [A41.9, R65.21, J96.01]    Assessment:   Principal Problem:    Sepsis with acute hypoxic respiratory failure and septic shock ([address]  Active Problems:    Edema of both lower legs    Atrial fibrillation with rapid ventricular response (HCC)    High cholesterol    Bilateral pneumonia    Essential hypertension, benign    Atherosclerosis of native coronary artery of native heart without angina pectoris    Chronic systolic congestive heart failure (HCC)    LBBB (left bundle branch block)    Chronic renal disease, stage III (HCC)    Paroxysmal atrial fibrillation ([address]  Resolved Problems:    * No resolved hospital problems. *      Plan:     80year old male with a history of persistent AF/AFL s/p AF and AFL ablations. He remains in AF and he now is admitted with an atypical PNA. His EF is now reduced so Tikosyn is his best AAD option.      -AF - s/p for AF ablation. Continue Eliquis and metoprolol. Initiate Tikosyn per protocol today. Will look to perform cardioversion in 24-48 hours if remains in AF. -Atypical PNA - continue abx per medicine.     -HFrEF  - continue GDMT. Thank you for allowing me to participate in the electrophysiologic care of this most pleasant patient. Please feel free to contact me if there are any questions or concerns. Mayra Ross.  Shubham Earl MD, MS  Clinical Cardiac Electrophysiology  [facility]    Subjective:   No complaints this AM, no chest pain or shortness of breath    Interval History: (History of pertinent interval events obtained from nursing staff)    ROS:  GEN:  No fever or chills  Cardiovascular:  As noted above  Pulmonary:  As noted above  Neuro:  No new focal motor or sensory loss      Objective:     Vitals:    06/20/22 2058 06/20/22 2257 06/21/22 0037 06/21/22 0510   BP: 119/68 127/89 103/69 (!) 121/90   Pulse: 79 76 71 70   Resp: 20 20 20   Temp: 99.3 °F (37.4 °C)  97.3 °F (36.3 °C) 98.2 °F (36.8 °C)   TempSrc: Oral      SpO2: 91%  91% 90%   Weight:    226 lb 4.8 oz (102.6 kg)   Height:           Physical Exam:  General-Well Developed, Well Nourished, No Acute Distress, Alert & Oriented x 3, appropriate mood. Neck- supple, no JVD  CV- regular rate and rhythm no MRG  Lung- clear bilaterally  Abd- soft, nontender, nondistended  Ext- no edema bilaterally.   Skin- warm and dry    Current Facility-Administered Medications   Medication Dose Route Frequency    dofetilide (TIKOSYN) capsule 500 mcg  500 mcg Oral 2 times per day    diazePAM (VALIUM) tablet 5 mg  5 mg Oral Q6H PRN    doxycycline hyclate (VIBRAMYCIN) capsule 100 mg  100 mg Oral 2 times per day    apixaban (ELIQUIS) tablet 5 mg  5 mg Oral BID    aspirin EC tablet 81 mg  81 mg Oral Daily    atorvastatin (LIPITOR) tablet 40 mg  40 mg Oral Nightly    azelastine HCl 0.15 % SOLN 1 spray- pt supply (Patient Supplied)  1 spray Nasal Daily    fenofibrate (TRIGLIDE) tablet 160 mg  160 mg Oral Daily    fluticasone (FLONASE) 50 MCG/ACT nasal spray 1 spray  1 spray Each Nostril Daily    lisinopril (PRINIVIL;ZESTRIL) tablet 30 mg  30 mg Oral Daily    magnesium oxide (MAG-OX) tablet 400 mg  400 mg Oral Daily    metoprolol succinate (TOPROL XL) extended release tablet 100 mg  100 mg Oral BID    montelukast (SINGULAIR) tablet 10 mg  10 mg Oral Nightly    albuterol sulfate HFA (PROVENTIL;VENTOLIN;PROAIR) 108 (90 Base) MCG/ACT inhaler 1 puff  1 puff Inhalation Q4H PRN    sodium chloride flush 0.9 % injection 5-40 mL  5-40 mL IntraVENous 2 times per day    sodium chloride flush 0.9 % injection 5-40 mL  5-40 mL IntraVENous PRN  0.9 % sodium chloride infusion   IntraVENous PRN    ondansetron (ZOFRAN-ODT) disintegrating tablet 4 mg  4 mg Oral Q8H PRN    Or    ondansetron (ZOFRAN) injection 4 mg  4 mg IntraVENous Q6H PRN    polyethylene glycol (GLYCOLAX) packet 17 g  17 g Oral Daily PRN    acetaminophen (TYLENOL) tablet 650 mg  650 mg Oral Q6H PRN    Or    acetaminophen (TYLENOL) suppository 650 mg  650 mg Rectal Q6H PRN    dilTIAZem 100 mg in sodium chloride 0.9 % 100 mL infusion ([facility])  2.5-15 mg/hr IntraVENous Continuous    guaiFENesin-dextromethorphan (ROBITUSSIN DM) 100-10 MG/5ML syrup 5 mL  5 mL Oral Q4H PRN    temazepam (RESTORIL) capsule 15 mg  15 mg Oral Nightly PRN       Data Review: No results found for this or any previous visit (from the past 24 hour(s)). EKG:  (EKG has been independently visualized by me with interpretation below): AF, LBBB, mild tachycardia.

## 2022-06-21 NOTE — PROGRESS NOTES
Physician Progress Note      Willy Crawford  [facility] #:                  [id number]  :                       1950  ADMIT DATE:       2022 11:52 PM  [address] DATE:  RESPONDING  PROVIDER #:        Frankie FELIX DO          QUERY TEXT:    Patient admitted with sepsis, noted to have atrial fibrillation and is   maintained on Eliquis. If possible, please document in progress notes and   discharge summary if you are evaluating and/or treating any of the following: The medical record reflects the following:  Risk Factors: Age > 72, HTN, history of A-Fib  Clinical Indicators: age 80, -77, EKG: \"atrial fibrillation\"  Treatment: Eliquis 5 mg 2 times daily  Options provided:  -- Secondary hypercoagulable state in a patient with atrial fibrillation  -- Other - I will add my own diagnosis  -- Disagree - Not applicable / Not valid  -- Disagree - Clinically unable to determine / Unknown  -- Refer to Clinical Documentation Reviewer    PROVIDER RESPONSE TEXT:    This patient has secondary hypercoagulable state related to atrial   fibrillation.     Query created by: Gita Sanz on 2022 2:39 PM      Electronically signed by:  Randa Hughes DO 2022 2:46 PM

## 2022-06-21 NOTE — PROGRESS NOTES
Date of Outreach Update:  Sara Vasquez was seen and assessed. Vitals:    06/21/22 0510 06/21/22 0750 06/21/22 0821 06/21/22 1430   BP: (!) 121/90  114/77 (!) 99/59   Pulse: 70 67 73 51   Resp: 20 17 20 18   Temp: 98.2 °F (36.8 °C)  98.2 °F (36.8 °C) 98.1 °F (36.7 °C)   TempSrc:   Oral Oral   SpO2: 90% 90% 96% 94%   Weight: 226 lb 4.8 oz (102.6 kg)      Height:             Pain Assessment    Previous Outreach assessment has been reviewed. There have been no significant clinical changes since the completion of the last dated Outreach assessment. Will continue to follow up per outreach protocol.     Signed By:   Jess Tran RN    June 21, 2022 3:49 PM

## 2022-06-21 NOTE — PROGRESS NOTES
Patient feeling anxious and restless, requesting Valium to help calm him down. Cardizem drip turned off at this time due to low HR 52-55, BP 99/59. Patient has converted back to NSR at 1130.

## 2022-06-21 NOTE — PROGRESS NOTES
Hospitalist Progress Note   Admit Date:  2022 11:52 PM   Name:  Dylan Smith   Age:  80 y.o. Sex:  male  :  1950   MRN:  [id number]   Room:  [facility]    Presenting Complaint: Fevers, cough, and SOB  Reason(s) for Admission: Peripheral edema [R60.9]  Atrial fibrillation with RVR ([address] [I48.91]  Sepsis ([address] [A41.9]  Pneumonia of both lungs due to infectious organism, unspecified part of lung [J18.9]  Sepsis with acute hypoxic respiratory failure and septic shock, due to unspecified organism ([address] [A41.9, R65.21, J96.01]     Hospital Course & Interval History:   80 y.o. male with medical history of atrial fibrillation with scheduled outpatient cardioversion on , HTN, CKD presented to ED on 22 with worsening SOB and cough with associated fevers and chills x 3-4 days. States that he saw an outpatient doctor about 3 days prior, who checked a BNP and when it resulted ~300, he was started on PO lasix. Despite taking the Lasix, his SOB worsened. Upon ER evaluation, patient was tachycardic and hypotensive. CXR shows B infiltrates. WBC is 12. 6. pBNP is elevated at 3,566 and with BLE edema. Last, recent echo from 2022 shows mildly reduced systolic function with EF of 45-50%. He has known afib (and is currently in RVR), and is scheduled for CARDIOVERSION on Monday. He was given IVFs and antibiotics in the ER. His BP initially improved then dropped so he was admitted to the ICU on Phenylephrine and Cardizem gtt. Subjective/24hr Events (22): Feeling jittery/anxious this afternoon. SOB improved. Cough still persistent. Denies CP/palpitations. Denies F/C since admission, but \"when it happens I'm soaking wet. \" Denies N/V/D.       Assessment & Plan:     Principal Problem:    Septic shock ([address] (Resolved)  - Likely due to pneumonia and rapid Afib  - Was on pressors initially  - Now off pressors since   - Continue Doxycycline PO - EOT   - Not on IVFs with fluid overload  - Blood cultures NGTD  - UA normal  - Procal normal  - Lactic acid normal    Active Problems:    Atrial fibrillation with rapid ventricular response (HCC)  - Likely exacerbated by pneumonia and sepsis  - Stopped Cardizem gtt  - Tikosyn started this AM by EP  - Continue Eliquis  - Cardiology following      Bilateral pneumonia  - CXR with multilobar infilatrates  - Concerning for edema vs atypical pneumonia  - CT Chest with bilateral atypical pneumonia  - Continue Doxycycline PO - EOT 6/25      Essential hypertension, benign  - Hypotension improving  - Continue Toprol  - Continue Lisinopril      Chronic systolic congestive heart failure (HCC)  - EF 45% on previos ECHO  - 6/19 ECHO now with EF 30%-35%  - Continue Lasix  - Cardiology following      Paroxysmal atrial fibrillation ([address]  - See above      Edema of both lower legs  - Due to fluid overload  - Stopped IV Lasix      Atherosclerosis of native coronary artery of native heart without angina pectoris      LBBB (left bundle branch block)      Chronic renal disease, stage III (HCC)  - Creatinine at baseline      High cholesterol    Diet:  ADULT DIET;  Regular; Low Fat/Low Chol/High Fiber/2 gm Na  DVT PPx: Eliquis  Code status: Full Code    Hospital Problems:  Hospital Problems           Last Modified POA    * (Principal) Sepsis with acute hypoxic respiratory failure and septic shock ([address] 6/19/2022 Yes    Atrial fibrillation with rapid ventricular response ([address] 6/19/2022 Yes    Bilateral pneumonia 6/19/2022 Yes    Essential hypertension, benign 6/19/2022 Yes    Chronic systolic congestive heart failure ([address] 6/19/2022 Yes    Paroxysmal atrial fibrillation ([address] 6/19/2022 Yes    Edema of both lower legs 6/19/2022 Yes    Atherosclerosis of native coronary artery of native heart without angina pectoris 6/19/2022 Yes    LBBB (left bundle branch block) 6/19/2022 Yes    Chronic renal disease, stage III ([address] 6/19/2022 Yes    High cholesterol 6/19/2022 Yes Objective:     Patient Vitals for the past 24 hrs:   Temp Pulse Resp BP SpO2   06/21/22 0821 98.2 °F (36.8 °C) 73 20 114/77 96 %   06/21/22 0750 -- 67 17 -- 90 %   06/21/22 0510 98.2 °F (36.8 °C) 70 20 (!) 121/90 90 %   06/21/22 0037 97.3 °F (36.3 °C) 71 20 103/69 91 %   06/20/22 2257 -- 76 -- 127/89 --   06/20/22 2058 99.3 °F (37.4 °C) 79 20 119/68 91 %   06/20/22 1754 98.2 °F (36.8 °C) 72 20 (!) 101/58 --   06/20/22 1311 97.9 °F (36.6 °C) 76 20 139/79 --       Oxygen Therapy  SpO2: 96 %  Pulse Oximetry Type: Continuous  Pulse via Oximetry: 112 beats per minute  SPO2 Low Alarm Limit POX: 89  Pulse Oximeter Device Mode: Other (Comment)  Pulse Oximeter Device Location: Left,Finger  O2 Device: Nasal cannula  Skin Assessment: Clean, dry, & intact  Skin Protection for O2 Device: No  O2 Flow Rate (L/min): 1 L/min    Estimated body mass index is 33.42 kg/m² as calculated from the following:    Height as of this encounter: 5' 9\" (1.753 m). Weight as of this encounter: 226 lb 4.8 oz (102.6 kg). Intake/Output Summary (Last 24 hours) at 6/21/2022 1248  Last data filed at 6/21/2022 0750  Gross per 24 hour   Intake 930 ml   Output 1050 ml   Net -120 ml         Physical Exam:   Blood pressure 114/77, pulse 73, temperature 98.2 °F (36.8 °C), temperature source Oral, resp. rate 20, height 5' 9\" (1.753 m), weight 226 lb 4.8 oz (102.6 kg), SpO2 96 %. General:    Well nourished. No overt distress  Head:  Normocephalic, atraumatic  Eyes:  Sclerae appear normal.  Pupils equally round. ENT:  Nares appear normal, no drainage. Moist oral mucosa  Neck:  No restricted ROM. Trachea midline   CV:   Tachy, irregular. No m/r/g. No jugular venous distension. Lungs:   CTAB. No wheezing, rhonchi, or rales. Respirations even, unlabored  Abdomen: Bowel sounds present. Soft, nontender, nondistended. Extremities: No cyanosis or clubbing. No edema  Skin:     No rashes and normal coloration. Warm and dry.     Neuro:  CN II-XII grossly intact. Sensation intact. A&Ox3  Psych:  Normal mood and affect.       I have reviewed ordered lab tests and independently visualized imaging below:    Recent Labs:  Recent Results (from the past 48 hour(s))   Urinalysis with Reflex to Culture    Collection Time: 06/19/22  4:20 PM    Specimen: Urine   Result Value Ref Range    Color, UA DARK YELLOW      Appearance TURBID      Specific Gravity, UA 1.032 (H) 1.001 - 1.023      pH, Urine 5.0 5.0 - 9.0      Protein, UA 30 (A) NEG mg/dL    Glucose,  mg/dL    Ketones, Urine Negative NEG mg/dL    Bilirubin Urine SMALL (A) NEG      Blood, Urine TRACE (A) NEG      Urobilinogen, Urine 1.0 0.2 - 1.0 EU/dL    Nitrite, Urine Negative NEG      Leukocyte Esterase, Urine Negative NEG      WBC, UA 0 0 /hpf    RBC, UA 0 0 /hpf    BACTERIA, URINE 0 0 /hpf    Urine Culture if Indicated CULTURE NOT INDICATED BY UA RESULT      Epithelial Cells UA 0 0 /hpf    Casts 0 0 /lpf    Crystals 0 0 /LPF    AMORPHOUS CRYSTAL 4+ (H) 0    Mucus, UA 0 0 /lpf    OTHER OBSERVATIONS RESULTS VERIFIED MANUALLY     Basic Metabolic Panel w/ Reflex to MG    Collection Time: 06/20/22  5:44 AM   Result Value Ref Range    Sodium 140 138 - 145 mmol/L    Potassium 4.9 3.5 - 5.1 mmol/L    Chloride 105 98 - 107 mmol/L    CO2 29 21 - 32 mmol/L    Anion Gap 6 (L) 7 - 16 mmol/L    Glucose 143 (H) 65 - 100 mg/dL    BUN 28 (H) 8 - 23 MG/DL    CREATININE 1.50 0.8 - 1.5 MG/DL    GFR African American 60 (L) >60 ml/min/1.73m2    GFR Non- 49 (L) >60 ml/min/1.73m2    Calcium 8.0 (L) 8.3 - 10.4 MG/DL   CBC with Auto Differential    Collection Time: 06/20/22  5:44 AM   Result Value Ref Range    WBC 12.3 (H) 4.3 - 11.1 K/uL    RBC 4.02 (L) 4.23 - 5.6 M/uL    Hemoglobin 11.5 (L) 13.6 - 17.2 g/dL    Hematocrit 36.5 (L) 41.1 - 50.3 %    MCV 90.8 79.6 - 97.8 FL    MCH 28.6 26.1 - 32.9 PG    MCHC 31.5 31.4 - 35.0 g/dL    RDW 14.3 11.9 - 14.6 %    Platelets 219 628 - 440 K/uL    MPV 9.3 (L) 9.4 - 12.3 FL    nRBC 0.02 0.0 - 0.2 K/uL    Differential Type AUTOMATED      Seg Neutrophils 79 (H) 43 - 78 %    Lymphocytes 15 13 - 44 %    Monocytes 5 4.0 - 12.0 %    Eosinophils % 0 (L) 0.5 - 7.8 %    Basophils 0 0.0 - 2.0 %    Immature Granulocytes 1 0.0 - 5.0 %    Segs Absolute 9.7 (H) 1.7 - 8.2 K/UL    Absolute Lymph # 1.9 0.5 - 4.6 K/UL    Absolute Mono # 0.6 0.1 - 1.3 K/UL    Absolute Eos # 0.0 0.0 - 0.8 K/UL    Basophils Absolute 0.0 0.0 - 0.2 K/UL    Absolute Immature Granulocyte 0.1 0.0 - 0.5 K/UL   Magnesium    Collection Time: 06/20/22  5:44 AM   Result Value Ref Range    Magnesium 2.2 1.8 - 2.4 mg/dL   Basic Metabolic Panel w/ Reflex to MG    Collection Time: 06/21/22  7:13 AM   Result Value Ref Range    Sodium 139 138 - 145 mmol/L    Potassium 4.5 3.5 - 5.1 mmol/L    Chloride 104 98 - 107 mmol/L    CO2 31 21 - 32 mmol/L    Anion Gap 4 (L) 7 - 16 mmol/L    Glucose 136 (H) 65 - 100 mg/dL    BUN 26 (H) 8 - 23 MG/DL    CREATININE 1.30 0.8 - 1.5 MG/DL    GFR African American >60 >60 ml/min/1.73m2    GFR Non- 58 (L) >60 ml/min/1.73m2    Calcium 8.2 (L) 8.3 - 10.4 MG/DL   CBC with Auto Differential    Collection Time: 06/21/22  7:13 AM   Result Value Ref Range    WBC 9.2 4.3 - 11.1 K/uL    RBC 4.06 (L) 4.23 - 5.6 M/uL    Hemoglobin 11.5 (L) 13.6 - 17.2 g/dL    Hematocrit 36.6 (L) 41.1 - 50.3 %    MCV 90.1 79.6 - 97.8 FL    MCH 28.3 26.1 - 32.9 PG    MCHC 31.4 31.4 - 35.0 g/dL    RDW 14.0 11.9 - 14.6 %    Platelets 304 331 - 745 K/uL    MPV 9.2 (L) 9.4 - 12.3 FL    nRBC 0.00 0.0 - 0.2 K/uL    Differential Type AUTOMATED      Seg Neutrophils 81 (H) 43 - 78 %    Lymphocytes 13 13 - 44 %    Monocytes 4 4.0 - 12.0 %    Eosinophils % 1 0.5 - 7.8 %    Basophils 0 0.0 - 2.0 %    Immature Granulocytes 1 0.0 - 5.0 %    Segs Absolute 7.4 1.7 - 8.2 K/UL    Absolute Lymph # 1.2 0.5 - 4.6 K/UL    Absolute Mono # 0.4 0.1 - 1.3 K/UL    Absolute Eos # 0.1 0.0 - 0.8 K/UL    Basophils Absolute 0.0 0.0 - 0.2 K/UL Absolute Immature Granulocyte 0.1 0.0 - 0.5 K/UL   EKG 12 Lead    Collection Time: 06/21/22 10:01 AM   Result Value Ref Range    Ventricular Rate 69 BPM    Atrial Rate 60 BPM    QRS Duration 150 ms    Q-T Interval 466 ms    QTc Calculation ([place]azett) 499 ms    R Axis 89 degrees    T Axis 168 degrees    Diagnosis Atrial fibrillation        Other Studies:  XR CHEST PORTABLE    Result Date: 6/18/2022  EXAM: XR CHEST PORTABLE HISTORY: Shortness of Breath. TECHNIQUE: Frontal chest. COMPARISON: None available. FINDINGS: The cardiac silhouette, mediastinum, and pulmonary vasculature are within normal limits. There is no pneumothorax. Infiltrates are seen in the right upper and lower lung and in the left lower lung. There is pleural thickening along the right chest wall. There there is no appreciable pleural effusion No significant osseous abnormalities are observed. Bilateral lung infiltrates as described above may represent pneumonia.        Current Meds:  Current Facility-Administered Medications   Medication Dose Route Frequency    dofetilide (TIKOSYN) capsule 500 mcg  500 mcg Oral 2 times per day    potassium chloride 20 mEq/50 mL IVPB (Central Line)  20 mEq IntraVENous PRN    Or    potassium chloride 10 mEq/100 mL IVPB (Peripheral Line)  10 mEq IntraVENous PRN    magnesium sulfate 2000 mg in 50 mL IVPB premix  2,000 mg IntraVENous PRN    diazePAM (VALIUM) tablet 5 mg  5 mg Oral Q6H PRN    doxycycline hyclate (VIBRAMYCIN) capsule 100 mg  100 mg Oral 2 times per day    apixaban (ELIQUIS) tablet 5 mg  5 mg Oral BID    aspirin EC tablet 81 mg  81 mg Oral Daily    atorvastatin (LIPITOR) tablet 40 mg  40 mg Oral Nightly    azelastine HCl 0.15 % SOLN 1 spray- pt supply (Patient Supplied)  1 spray Nasal Daily    fenofibrate (TRIGLIDE) tablet 160 mg  160 mg Oral Daily    fluticasone (FLONASE) 50 MCG/ACT nasal spray 1 spray  1 spray Each Nostril Daily    lisinopril (PRINIVIL;ZESTRIL) tablet 30 mg  30 mg Oral Daily    magnesium oxide (MAG-OX) tablet 400 mg  400 mg Oral Daily    metoprolol succinate (TOPROL XL) extended release tablet 100 mg  100 mg Oral BID    montelukast (SINGULAIR) tablet 10 mg  10 mg Oral Nightly    albuterol sulfate HFA (PROVENTIL;VENTOLIN;PROAIR) 108 (90 Base) MCG/ACT inhaler 1 puff  1 puff Inhalation Q4H PRN    sodium chloride flush 0.9 % injection 5-40 mL  5-40 mL IntraVENous 2 times per day    sodium chloride flush 0.9 % injection 5-40 mL  5-40 mL IntraVENous PRN    0.9 % sodium chloride infusion   IntraVENous PRN    ondansetron (ZOFRAN-ODT) disintegrating tablet 4 mg  4 mg Oral Q8H PRN    Or    ondansetron (ZOFRAN) injection 4 mg  4 mg IntraVENous Q6H PRN    polyethylene glycol (GLYCOLAX) packet 17 g  17 g Oral Daily PRN    acetaminophen (TYLENOL) tablet 650 mg  650 mg Oral Q6H PRN    Or    acetaminophen (TYLENOL) suppository 650 mg  650 mg Rectal Q6H PRN    dilTIAZem 100 mg in sodium chloride 0.9 % 100 mL infusion ([facility])  2.5-15 mg/hr IntraVENous Continuous    guaiFENesin-dextromethorphan (ROBITUSSIN DM) 100-10 MG/5ML syrup 5 mL  5 mL Oral Q4H PRN    temazepam (RESTORIL) capsule 15 mg  15 mg Oral Nightly PRN       Discharge Plan:     Location: Home  Days: 3-4 days after Tikosyn load  PPD Ordered: N/A    Signed:  Christy Deng DO    Part of this note may have been written by using a voice dictation software. The note has been proof read but may still contain some grammatical/other typographical errors.

## 2022-06-21 NOTE — PROGRESS NOTES
Right hand is swollen, red and hot to touch. Appears to be IV infiltrate. Heat applied, Dr. Georges Funes notified through perfect serve.

## 2022-06-21 NOTE — PROGRESS NOTES
[facility] CRITICAL CARE OUTREACH NURSE PROGRESS REPORT      SUBJECTIVE: Called to assess patient secondary to ICU outreach protocol. @[facility]([id number])@  Vitals:    06/21/22 0037 06/21/22 0510 06/21/22 0750 06/21/22 0821   BP: 103/69 (!) 121/90  114/77   Pulse: 71 70 67 73   Resp: 20 20 17 20   Temp: 97.3 °F (36.3 °C) 98.2 °F (36.8 °C)  98.2 °F (36.8 °C)   TempSrc:    Oral   SpO2: 91% 90% 90% 96%   Weight:  226 lb 4.8 oz (102.6 kg)     Height:          EKG: atrial fibrillation, rate 70's, unchanged from previous tracings. LAB DATA:    Recent Labs     06/18/22 2320 06/18/22 2320 06/19/22  0627 06/20/22  0544 06/21/22  0713      < > 141 140 139   K 4.6   < > 4.4 4.9 4.5      < > 108* 105 104   CO2 25   < > 25 29 31   BUN 23   < > 23 28* 26*   GFRAA >60   < > >60 60* >60   MG 1.9  --   --  2.2  --    GLOB 3.1  --   --   --   --    ALT 70*  --   --   --   --     < > = values in this interval not displayed. Recent Labs     06/18/22 2320 06/20/22  0544 06/21/22  0713   WBC 12.6* 12.3* 9.2   HGB 12.0* 11.5* 11.5*   HCT 37.5* 36.5* 36.6*    320 232          OBJECTIVE: On arrival to room, I found patient to be sitting up on side of the bed, eating breakfast with wife at bedside. ASSESSMENT:  Pt comfortable eating breakfast, lung sounds clear. Currently on 10 of cardizem w/ rate in the 70's afib. A&O X 4 and in NAD. Plan for potential cardioversion in the next few days if afib persists. PLAN:  Will continue monitoring per ICU outreach protocol.

## 2022-06-21 NOTE — PROGRESS NOTES
Date of Outreach Update:  Peter Churchill was seen and assessed. Previous Outreach assessment has been reviewed. There have been no significant clinical changes since the completion of the last dated Outreach assessment. Will continue to follow up per outreach protocol.     Signed By:   Chava Brown RN    June 21, 2022 2:30 AM

## 2022-06-22 LAB
ANION GAP SERPL CALC-SCNC: 5 MMOL/L (ref 7–16)
BASOPHILS # BLD: 0 K/UL (ref 0–0.2)
BASOPHILS NFR BLD: 1 % (ref 0–2)
BUN SERPL-MCNC: 19 MG/DL (ref 8–23)
CALCIUM SERPL-MCNC: 8 MG/DL (ref 8.3–10.4)
CHLORIDE SERPL-SCNC: 106 MMOL/L (ref 98–107)
CO2 SERPL-SCNC: 30 MMOL/L (ref 21–32)
CREAT SERPL-MCNC: 1.2 MG/DL (ref 0.8–1.5)
DIFFERENTIAL METHOD BLD: ABNORMAL
EKG ATRIAL RATE: 69 BPM
EKG ATRIAL RATE: 70 BPM
EKG DIAGNOSIS: NORMAL
EKG DIAGNOSIS: NORMAL
EKG P AXIS: 45 DEGREES
EKG P AXIS: 55 DEGREES
EKG P-R INTERVAL: 182 MS
EKG P-R INTERVAL: 192 MS
EKG Q-T INTERVAL: 440 MS
EKG Q-T INTERVAL: 474 MS
EKG QRS DURATION: 150 MS
EKG QRS DURATION: 154 MS
EKG QTC CALCULATION (BAZETT): 471 MS
EKG QTC CALCULATION (BAZETT): 511 MS
EKG R AXIS: -66 DEGREES
EKG R AXIS: 71 DEGREES
EKG T AXIS: 134 DEGREES
EKG T AXIS: 60 DEGREES
EKG VENTRICULAR RATE: 69 BPM
EKG VENTRICULAR RATE: 70 BPM
EOSINOPHIL # BLD: 0.2 K/UL (ref 0–0.8)
EOSINOPHIL NFR BLD: 2 % (ref 0.5–7.8)
ERYTHROCYTE [DISTWIDTH] IN BLOOD BY AUTOMATED COUNT: 14.2 % (ref 11.9–14.6)
GLUCOSE SERPL-MCNC: 135 MG/DL (ref 65–100)
HCT VFR BLD AUTO: 37.8 % (ref 41.1–50.3)
HGB BLD-MCNC: 11.6 G/DL (ref 13.6–17.2)
IMM GRANULOCYTES # BLD AUTO: 0.1 K/UL (ref 0–0.5)
IMM GRANULOCYTES NFR BLD AUTO: 1 % (ref 0–5)
LYMPHOCYTES # BLD: 1.4 K/UL (ref 0.5–4.6)
LYMPHOCYTES NFR BLD: 17 % (ref 13–44)
MAGNESIUM SERPL-MCNC: 1.9 MG/DL (ref 1.8–2.4)
MCH RBC QN AUTO: 28.5 PG (ref 26.1–32.9)
MCHC RBC AUTO-ENTMCNC: 30.7 G/DL (ref 31.4–35)
MCV RBC AUTO: 92.9 FL (ref 79.6–97.8)
MONOCYTES # BLD: 0.5 K/UL (ref 0.1–1.3)
MONOCYTES NFR BLD: 6 % (ref 4–12)
NEUTS SEG # BLD: 6.1 K/UL (ref 1.7–8.2)
NEUTS SEG NFR BLD: 73 % (ref 43–78)
NRBC # BLD: 0 K/UL (ref 0–0.2)
PLATELET # BLD AUTO: 255 K/UL (ref 150–450)
PMV BLD AUTO: 9.3 FL (ref 9.4–12.3)
POTASSIUM SERPL-SCNC: 4.4 MMOL/L (ref 3.5–5.1)
RBC # BLD AUTO: 4.07 M/UL (ref 4.23–5.6)
SODIUM SERPL-SCNC: 141 MMOL/L (ref 138–145)
WBC # BLD AUTO: 8.2 K/UL (ref 4.3–11.1)

## 2022-06-22 PROCEDURE — 6370000000 HC RX 637 (ALT 250 FOR IP): Performed by: INTERNAL MEDICINE

## 2022-06-22 PROCEDURE — 6360000002 HC RX W HCPCS: Performed by: INTERNAL MEDICINE

## 2022-06-22 PROCEDURE — 99232 SBSQ HOSP IP/OBS MODERATE 35: CPT | Performed by: INTERNAL MEDICINE

## 2022-06-22 PROCEDURE — 93005 ELECTROCARDIOGRAM TRACING: CPT | Performed by: INTERNAL MEDICINE

## 2022-06-22 PROCEDURE — 2700000000 HC OXYGEN THERAPY PER DAY

## 2022-06-22 PROCEDURE — 6370000000 HC RX 637 (ALT 250 FOR IP): Performed by: FAMILY MEDICINE

## 2022-06-22 PROCEDURE — 2580000003 HC RX 258: Performed by: FAMILY MEDICINE

## 2022-06-22 PROCEDURE — 1100000003 HC PRIVATE W/ TELEMETRY

## 2022-06-22 PROCEDURE — 94640 AIRWAY INHALATION TREATMENT: CPT

## 2022-06-22 PROCEDURE — 80048 BASIC METABOLIC PNL TOTAL CA: CPT

## 2022-06-22 PROCEDURE — 36415 COLL VENOUS BLD VENIPUNCTURE: CPT

## 2022-06-22 PROCEDURE — 94760 N-INVAS EAR/PLS OXIMETRY 1: CPT

## 2022-06-22 PROCEDURE — 85025 COMPLETE CBC W/AUTO DIFF WBC: CPT

## 2022-06-22 PROCEDURE — 83735 ASSAY OF MAGNESIUM: CPT

## 2022-06-22 RX ORDER — ALBUTEROL SULFATE 2.5 MG/3ML
2.5 SOLUTION RESPIRATORY (INHALATION) EVERY 6 HOURS PRN
Status: DISCONTINUED | OUTPATIENT
Start: 2022-06-22 | End: 2022-06-24 | Stop reason: HOSPADM

## 2022-06-22 RX ORDER — DIPHENHYDRAMINE HCL 25 MG
25 CAPSULE ORAL EVERY 6 HOURS PRN
Status: DISCONTINUED | OUTPATIENT
Start: 2022-06-22 | End: 2022-06-24 | Stop reason: HOSPADM

## 2022-06-22 RX ADMIN — SODIUM CHLORIDE, PRESERVATIVE FREE 10 ML: 5 INJECTION INTRAVENOUS at 08:46

## 2022-06-22 RX ADMIN — ATORVASTATIN CALCIUM 40 MG: 40 TABLET, FILM COATED ORAL at 20:23

## 2022-06-22 RX ADMIN — SODIUM CHLORIDE, PRESERVATIVE FREE 10 ML: 5 INJECTION INTRAVENOUS at 20:39

## 2022-06-22 RX ADMIN — MONTELUKAST 10 MG: 10 TABLET, FILM COATED ORAL at 20:23

## 2022-06-22 RX ADMIN — ALBUTEROL SULFATE 2.5 MG: 2.5 SOLUTION RESPIRATORY (INHALATION) at 00:30

## 2022-06-22 RX ADMIN — APIXABAN 5 MG: 5 TABLET, FILM COATED ORAL at 08:40

## 2022-06-22 RX ADMIN — LISINOPRIL 30 MG: 20 TABLET ORAL at 08:39

## 2022-06-22 RX ADMIN — DOXYCYCLINE HYCLATE 100 MG: 100 CAPSULE ORAL at 20:23

## 2022-06-22 RX ADMIN — METOPROLOL SUCCINATE 100 MG: 100 TABLET, EXTENDED RELEASE ORAL at 08:40

## 2022-06-22 RX ADMIN — APIXABAN 5 MG: 5 TABLET, FILM COATED ORAL at 20:23

## 2022-06-22 RX ADMIN — METOPROLOL SUCCINATE 100 MG: 100 TABLET, EXTENDED RELEASE ORAL at 20:23

## 2022-06-22 RX ADMIN — ASPIRIN 81 MG: 81 TABLET ORAL at 08:40

## 2022-06-22 RX ADMIN — DOFETILIDE 250 MCG: 0.25 CAPSULE ORAL at 20:28

## 2022-06-22 RX ADMIN — Medication 400 MG: at 08:40

## 2022-06-22 RX ADMIN — DOFETILIDE 250 MCG: 0.25 CAPSULE ORAL at 09:07

## 2022-06-22 RX ADMIN — FENOFIBRATE 160 MG: 160 TABLET ORAL at 08:40

## 2022-06-22 RX ADMIN — DOXYCYCLINE HYCLATE 100 MG: 100 CAPSULE ORAL at 08:40

## 2022-06-22 RX ADMIN — AZELASTINE HCL 1 SPRAY: 205.5 SPRAY NASAL at 08:43

## 2022-06-22 ASSESSMENT — PAIN SCALES - GENERAL
PAINLEVEL_OUTOF10: 0
PAINLEVEL_OUTOF10: 0

## 2022-06-22 NOTE — PROGRESS NOTES
Date of Outreach Update:  Zane Serrato was seen and assessed. In bed with no complaints at this time. CM- SR, HR 71. Vitals:    06/21/22 1430 06/21/22 1810 06/21/22 2041 06/21/22 2119   BP: (!) 99/59 112/72 125/75 125/75   Pulse: 51 61 68 70   Resp: 18 20 20    Temp: 98.1 °F (36.7 °C) 98.2 °F (36.8 °C) 98.1 °F (36.7 °C)    TempSrc: Oral Oral     SpO2: 94% 92% 97%    Weight:       Height:              Previous Outreach assessment has been reviewed. There have been no significant clinical changes since the completion of the last dated Outreach assessment. Will continue to follow up per outreach protocol.     Signed By:   Alan Ellis RN    June 21, 2022 11:25 PM

## 2022-06-22 NOTE — PROGRESS NOTES
[facility] CARDIOLOGY PROGRESS NOTE           6/22/2022 7:17 AM    Admit Date: 6/18/2022    Admit Diagnosis: Peripheral edema [R60.9]  Atrial fibrillation with RVR (HCC) [I48.91]  Sepsis ([address] [A41.9]  Pneumonia of both lungs due to infectious organism, unspecified part of lung [J18.9]  Sepsis with acute hypoxic respiratory failure and septic shock, due to unspecified organism ([address] [A41.9, R65.21, J96.01]    Assessment:   Principal Problem:    Sepsis with acute hypoxic respiratory failure and septic shock ([address]  Active Problems:    Edema of both lower legs    Atrial fibrillation with rapid ventricular response (HCC)    High cholesterol    Bilateral pneumonia    Essential hypertension, benign    Atherosclerosis of native coronary artery of native heart without angina pectoris    Chronic systolic congestive heart failure (HCC)    LBBB (left bundle branch block)    Chronic renal disease, stage III (HCC)    Paroxysmal atrial fibrillation ([address]  Resolved Problems:    * No resolved hospital problems. *      Plan:     80year old male with a history of persistent AF/AFL s/p AF and AFL ablations admitted with AF and atypical PNA.       -AF - s/p for AF ablation. Continue Eliquis and metoprolol. On Tikosyn protocol and converted to NSR after 1st dose. Reduced to 250 mcg po BID 2/2 prolonged QTc. QTc stable this AM. Continue protocol as scheduled.    -Atypical PNA - continue abx per medicine.     -HFrEF  - continue GDMT. Thank you for allowing me to participate in the electrophysiologic care of this most pleasant patient. Please feel free to contact me if there are any questions or concerns. Sandhya Brooks MD, MS  Clinical Cardiac Electrophysiology  [facility] Cardiology    Subjective:   No complaints this AM, no chest pain or shortness of breath    Interval History: (History of pertinent interval events obtained from nursing staff)    ROS:  GEN:  No fever or chills  Cardiovascular:  As noted above  Pulmonary:  As noted above  Neuro:  No new focal motor or sensory loss      Objective:     Vitals:    06/21/22 2119 06/22/22 0049 06/22/22 0125 06/22/22 0450   BP: 125/75 120/76  123/79   Pulse: 70 66 67 70   Resp:  20 20 20   Temp:  99.4 °F (37.4 °C)  97.8 °F (36.6 °C)   TempSrc:       SpO2:  95% 98% 94%   Weight:    226 lb 11.2 oz (102.8 kg)   Height:           Physical Exam:  General-Well Developed, Well Nourished, No Acute Distress, Alert & Oriented x 3, appropriate mood. Neck- supple, no JVD  CV- regular rate and rhythm no MRG  Lung- clear bilaterally  Abd- soft, nontender, nondistended  Ext- no edema bilaterally.   Skin- warm and dry    Current Facility-Administered Medications   Medication Dose Route Frequency    albuterol (PROVENTIL) nebulizer solution 2.5 mg  2.5 mg Nebulization Q6H PRN    potassium chloride 20 mEq/50 mL IVPB (Central Line)  20 mEq IntraVENous PRN    Or    potassium chloride 10 mEq/100 mL IVPB (Peripheral Line)  10 mEq IntraVENous PRN    magnesium sulfate 2000 mg in 50 mL IVPB premix  2,000 mg IntraVENous PRN    dofetilide (TIKOSYN) capsule 250 mcg  250 mcg Oral 2 times per day    diazePAM (VALIUM) tablet 5 mg  5 mg Oral Q6H PRN    doxycycline hyclate (VIBRAMYCIN) capsule 100 mg  100 mg Oral 2 times per day    apixaban (ELIQUIS) tablet 5 mg  5 mg Oral BID    aspirin EC tablet 81 mg  81 mg Oral Daily    atorvastatin (LIPITOR) tablet 40 mg  40 mg Oral Nightly    azelastine HCl 0.15 % SOLN 1 spray- pt supply (Patient Supplied)  1 spray Nasal Daily    fenofibrate (TRIGLIDE) tablet 160 mg  160 mg Oral Daily    fluticasone (FLONASE) 50 MCG/ACT nasal spray 1 spray  1 spray Each Nostril Daily    lisinopril (PRINIVIL;ZESTRIL) tablet 30 mg  30 mg Oral Daily    magnesium oxide (MAG-OX) tablet 400 mg  400 mg Oral Daily    metoprolol succinate (TOPROL XL) extended release tablet 100 mg  100 mg Oral BID    montelukast (SINGULAIR) tablet 10 mg  10 mg Oral Nightly    albuterol sulfate HFA (PROVENTIL;VENTOLIN;PROAIR) 108 (90 Base) MCG/ACT inhaler 1 puff  1 puff Inhalation Q4H PRN    sodium chloride flush 0.9 % injection 5-40 mL  5-40 mL IntraVENous 2 times per day    sodium chloride flush 0.9 % injection 5-40 mL  5-40 mL IntraVENous PRN    0.9 % sodium chloride infusion   IntraVENous PRN    ondansetron (ZOFRAN-ODT) disintegrating tablet 4 mg  4 mg Oral Q8H PRN    Or    ondansetron (ZOFRAN) injection 4 mg  4 mg IntraVENous Q6H PRN    polyethylene glycol (GLYCOLAX) packet 17 g  17 g Oral Daily PRN    acetaminophen (TYLENOL) tablet 650 mg  650 mg Oral Q6H PRN    Or    acetaminophen (TYLENOL) suppository 650 mg  650 mg Rectal Q6H PRN    dilTIAZem 100 mg in sodium chloride 0.9 % 100 mL infusion ([facility])  2.5-15 mg/hr IntraVENous Continuous    guaiFENesin-dextromethorphan (ROBITUSSIN DM) 100-10 MG/5ML syrup 5 mL  5 mL Oral Q4H PRN    temazepam (RESTORIL) capsule 15 mg  15 mg Oral Nightly PRN       Data Review:   Recent Results (from the past 24 hour(s))   EKG 12 Lead    Collection Time: 06/21/22 10:01 AM   Result Value Ref Range    Ventricular Rate 69 BPM    Atrial Rate 60 BPM    QRS Duration 150 ms    Q-T Interval 466 ms    QTc Calculation (Bazett) 499 ms    R Axis 89 degrees    T Axis 168 degrees    Diagnosis Atrial fibrillation    EKG 12 Lead    Collection Time: 06/21/22  1:36 PM   Result Value Ref Range    Ventricular Rate 57 BPM    Atrial Rate 57 BPM    P-R Interval 184 ms    QRS Duration 150 ms    Q-T Interval 602 ms    QTc Calculation (Bazett) 585 ms    P Axis 34 degrees    R Axis 40 degrees    T Axis 133 degrees    Diagnosis Sinus bradycardia    EKG 12 Lead    Collection Time: 06/21/22 11:58 PM   Result Value Ref Range    Ventricular Rate 70 BPM    Atrial Rate 70 BPM    P-R Interval 182 ms    QRS Duration 150 ms    Q-T Interval 474 ms    QTc Calculation (Bazett) 511 ms    P Axis 45 degrees    R Axis 71 degrees    T Axis 60 degrees    Diagnosis Normal sinus rhythm EKG:  (EKG has been independently visualized by me with interpretation below): NSR, LBBB, normal axis.

## 2022-06-22 NOTE — PROGRESS NOTES
Hospitalist Progress Note   Admit Date:  2022 11:52 PM   Name:  Sid Cancer   Age:  80 y.o. Sex:  male  :  1950   MRN:  [id number]   Room:  [facility]/    Presenting Complaint: Fevers, cough, and SOB  Reason(s) for Admission: Peripheral edema [R60.9]  Atrial fibrillation with RVR ([address] [I48.91]  Sepsis ([address] [A41.9]  Pneumonia of both lungs due to infectious organism, unspecified part of lung [J18.9]  Sepsis with acute hypoxic respiratory failure and septic shock, due to unspecified organism ([address] [A41.9, R65.21, J96.01]     Hospital Course & Interval History:   80 y.o. male with medical history of atrial fibrillation with scheduled outpatient cardioversion on , HTN, CKD presented to ED on 22 with worsening SOB and cough with associated fevers and chills x 3-4 days. States that he saw an outpatient doctor about 3 days prior, who checked a BNP and when it resulted ~300, he was started on PO lasix. Despite taking the Lasix, his SOB worsened. Upon ER evaluation, patient was tachycardic and hypotensive. CXR shows B infiltrates. WBC is 12. 6. pBNP is elevated at 3,566 and with BLE edema. Last, recent echo from 2022 shows mildly reduced systolic function with EF of 45-50%. He has known afib (and is currently in RVR), and is scheduled for CARDIOVERSION on Monday. He was given IVFs and antibiotics in the ER. His BP initially improved then dropped so he was admitted to the ICU on Phenylephrine and Cardizem gtt. His BP normalized. He was sent to the floor. He was started on Tikosyn and changed to Doxycycline. He converted to NSR on . Subjective/24hr Events (22): Feeling better today. SOB improved. Cough improved. Denies CP/palpitations. Denies F/C. Denies N/V/D.       Assessment & Plan:     Principal Problem:    Septic shock ([address] (Resolved)  - Likely due to pneumonia and rapid Afib  - Was on pressors initially  - Now off pressors since   - Continue Doxycycline PO - EOT 6/25  - Not on IVFs with fluid overload  - Blood cultures NGTD  - UA normal  - Procal normal  - Lactic acid normal    Active Problems:    Atrial fibrillation with rapid ventricular response (HCC) (Resolved)  - Likely exacerbated by pneumonia and sepsis  - Converted to NSR on 6/21  - Tikosyn started  On 6/21  - Continue Eliquis  - Stopped Cardizem gtt  - Cardiology following      Bilateral pneumonia  - CXR with multilobar infilatrates  - Concerning for edema vs atypical pneumonia  - CT Chest with bilateral atypical pneumonia  - Continue Doxycycline PO - EOT 6/25      Essential hypertension, benign  - Hypotension improving  - Continue Toprol  - Continue Lisinopril      Chronic systolic congestive heart failure (HCC)  - EF 45% on previos ECHO  - 6/19 ECHO now with EF 30%-35%  - Continue Lasix  - Cardiology following      Paroxysmal atrial fibrillation ([address]  - See above      Edema of both lower legs  - Due to fluid overload  - Stopped IV Lasix      Atherosclerosis of native coronary artery of native heart without angina pectoris      LBBB (left bundle branch block)      Chronic renal disease, stage III (HCC)  - Creatinine at baseline      High cholesterol    Diet:  ADULT DIET;  Regular; Low Fat/Low Chol/High Fiber/2 gm Na  DVT PPx: Eliquis  Code status: Full Code    Hospital Problems:  Hospital Problems           Last Modified POA    * (Principal) Sepsis with acute hypoxic respiratory failure and septic shock ([address] 6/19/2022 Yes    Atrial fibrillation with rapid ventricular response ([address] 6/19/2022 Yes    Bilateral pneumonia 6/19/2022 Yes    Essential hypertension, benign 6/19/2022 Yes    Chronic systolic congestive heart failure ([address] 6/19/2022 Yes    Paroxysmal atrial fibrillation ([address] 6/19/2022 Yes    Edema of both lower legs 6/19/2022 Yes    Atherosclerosis of native coronary artery of native heart without angina pectoris 6/19/2022 Yes    LBBB (left bundle branch block) 6/19/2022 Yes    Chronic renal disease, stage III ([address] 6/19/2022 Yes    High cholesterol 6/19/2022 Yes          Objective:     Patient Vitals for the past 24 hrs:   Temp Pulse Resp BP SpO2   06/22/22 0907 98.1 °F (36.7 °C) 73 20 115/83 90 %   06/22/22 0450 97.8 °F (36.6 °C) 70 20 123/79 94 %   06/22/22 0125 -- 67 20 -- 98 %   06/22/22 0049 99.4 °F (37.4 °C) 66 20 120/76 95 %   06/21/22 2119 -- 70 -- 125/75 --   06/21/22 2041 98.1 °F (36.7 °C) 68 20 125/75 97 %   06/21/22 1810 98.2 °F (36.8 °C) 61 20 112/72 92 %   06/21/22 1430 98.1 °F (36.7 °C) 51 18 (!) 99/59 94 %       Oxygen Therapy  SpO2: 90 % (PATIENT HAD HIS OXYGEN OFF)  Pulse Oximetry Type: Continuous  Pulse via Oximetry: 112 beats per minute  SPO2 Low Alarm Limit POX: 89  Pulse Oximeter Device Mode: Other (Comment)  Pulse Oximeter Device Location: Left,Finger  O2 Device: Nasal cannula  Skin Assessment: Clean, dry, & intact  Skin Protection for O2 Device: No  O2 Flow Rate (L/min): 2 L/min    Estimated body mass index is 33.48 kg/m² as calculated from the following:    Height as of this encounter: 5' 9\" (1.753 m). Weight as of this encounter: 226 lb 11.2 oz (102.8 kg). Intake/Output Summary (Last 24 hours) at 6/22/2022 1252  Last data filed at 6/22/2022 0917  Gross per 24 hour   Intake 720 ml   Output 2200 ml   Net -1480 ml         Physical Exam:   Blood pressure 115/83, pulse 73, temperature 98.1 °F (36.7 °C), temperature source Oral, resp. rate 20, height 5' 9\" (1.753 m), weight 226 lb 11.2 oz (102.8 kg), SpO2 90 %. General:    Well nourished. No overt distress  Head:  Normocephalic, atraumatic  Eyes:  Sclerae appear normal.  Pupils equally round. ENT:  Nares appear normal, no drainage. Moist oral mucosa  Neck:  No restricted ROM. Trachea midline   CV:   Tachy, irregular. No m/r/g. No jugular venous distension. Lungs:   CTAB. No wheezing, rhonchi, or rales. Respirations even, unlabored  Abdomen: Bowel sounds present. Soft, nontender, nondistended.   Extremities: No cyanosis or clubbing. No edema  Skin:     No rashes and normal coloration. Warm and dry. Neuro:  CN II-XII grossly intact. Sensation intact. A&Ox3  Psych:  Normal mood and affect.       I have reviewed ordered lab tests and independently visualized imaging below:    Recent Labs:  Recent Results (from the past 48 hour(s))   Basic Metabolic Panel w/ Reflex to MG    Collection Time: 06/21/22  7:13 AM   Result Value Ref Range    Sodium 139 138 - 145 mmol/L    Potassium 4.5 3.5 - 5.1 mmol/L    Chloride 104 98 - 107 mmol/L    CO2 31 21 - 32 mmol/L    Anion Gap 4 (L) 7 - 16 mmol/L    Glucose 136 (H) 65 - 100 mg/dL    BUN 26 (H) 8 - 23 MG/DL    CREATININE 1.30 0.8 - 1.5 MG/DL    GFR African American >60 >60 ml/min/1.73m2    GFR Non- 58 (L) >60 ml/min/1.73m2    Calcium 8.2 (L) 8.3 - 10.4 MG/DL   CBC with Auto Differential    Collection Time: 06/21/22  7:13 AM   Result Value Ref Range    WBC 9.2 4.3 - 11.1 K/uL    RBC 4.06 (L) 4.23 - 5.6 M/uL    Hemoglobin 11.5 (L) 13.6 - 17.2 g/dL    Hematocrit 36.6 (L) 41.1 - 50.3 %    MCV 90.1 79.6 - 97.8 FL    MCH 28.3 26.1 - 32.9 PG    MCHC 31.4 31.4 - 35.0 g/dL    RDW 14.0 11.9 - 14.6 %    Platelets 734 818 - 578 K/uL    MPV 9.2 (L) 9.4 - 12.3 FL    nRBC 0.00 0.0 - 0.2 K/uL    Differential Type AUTOMATED      Seg Neutrophils 81 (H) 43 - 78 %    Lymphocytes 13 13 - 44 %    Monocytes 4 4.0 - 12.0 %    Eosinophils % 1 0.5 - 7.8 %    Basophils 0 0.0 - 2.0 %    Immature Granulocytes 1 0.0 - 5.0 %    Segs Absolute 7.4 1.7 - 8.2 K/UL    Absolute Lymph # 1.2 0.5 - 4.6 K/UL    Absolute Mono # 0.4 0.1 - 1.3 K/UL    Absolute Eos # 0.1 0.0 - 0.8 K/UL    Basophils Absolute 0.0 0.0 - 0.2 K/UL    Absolute Immature Granulocyte 0.1 0.0 - 0.5 K/UL   EKG 12 Lead    Collection Time: 06/21/22 10:01 AM   Result Value Ref Range    Ventricular Rate 69 BPM    Atrial Rate 60 BPM    QRS Duration 150 ms    Q-T Interval 466 ms    QTc Calculation (Bazett) 499 ms    R Axis 89 degrees    T Axis 168 degrees Diagnosis Atrial fibrillation    EKG 12 Lead    Collection Time: 06/21/22  1:36 PM   Result Value Ref Range    Ventricular Rate 57 BPM    Atrial Rate 57 BPM    P-R Interval 184 ms    QRS Duration 150 ms    Q-T Interval 602 ms    QTc Calculation (Bazett) 585 ms    P Axis 34 degrees    R Axis 40 degrees    T Axis 133 degrees    Diagnosis Sinus bradycardia    EKG 12 Lead    Collection Time: 06/21/22 11:58 PM   Result Value Ref Range    Ventricular Rate 70 BPM    Atrial Rate 70 BPM    P-R Interval 182 ms    QRS Duration 150 ms    Q-T Interval 474 ms    QTc Calculation (Bazett) 511 ms    P Axis 45 degrees    R Axis 71 degrees    T Axis 60 degrees    Diagnosis Normal sinus rhythm    CBC with Auto Differential    Collection Time: 06/22/22  6:01 AM   Result Value Ref Range    WBC 8.2 4.3 - 11.1 K/uL    RBC 4.07 (L) 4.23 - 5.6 M/uL    Hemoglobin 11.6 (L) 13.6 - 17.2 g/dL    Hematocrit 37.8 (L) 41.1 - 50.3 %    MCV 92.9 79.6 - 97.8 FL    MCH 28.5 26.1 - 32.9 PG    MCHC 30.7 (L) 31.4 - 35.0 g/dL    RDW 14.2 11.9 - 14.6 %    Platelets 583 586 - 274 K/uL    MPV 9.3 (L) 9.4 - 12.3 FL    nRBC 0.00 0.0 - 0.2 K/uL    Differential Type AUTOMATED      Seg Neutrophils 73 43 - 78 %    Lymphocytes 17 13 - 44 %    Monocytes 6 4.0 - 12.0 %    Eosinophils % 2 0.5 - 7.8 %    Basophils 1 0.0 - 2.0 %    Immature Granulocytes 1 0.0 - 5.0 %    Segs Absolute 6.1 1.7 - 8.2 K/UL    Absolute Lymph # 1.4 0.5 - 4.6 K/UL    Absolute Mono # 0.5 0.1 - 1.3 K/UL    Absolute Eos # 0.2 0.0 - 0.8 K/UL    Basophils Absolute 0.0 0.0 - 0.2 K/UL    Absolute Immature Granulocyte 0.1 0.0 - 0.5 K/UL   Basic Metabolic Panel    Collection Time: 06/22/22  7:01 AM   Result Value Ref Range    Sodium 141 138 - 145 mmol/L    Potassium 4.4 3.5 - 5.1 mmol/L    Chloride 106 98 - 107 mmol/L    CO2 30 21 - 32 mmol/L    Anion Gap 5 (L) 7 - 16 mmol/L    Glucose 135 (H) 65 - 100 mg/dL    BUN 19 8 - 23 MG/DL    CREATININE 1.20 0.8 - 1.5 MG/DL    GFR  >60 >60 ml/min/1.73m2    GFR Non-African American >60 >60 ml/min/1.73m2    Calcium 8.0 (L) 8.3 - 10.4 MG/DL   Magnesium    Collection Time: 06/22/22  7:01 AM   Result Value Ref Range    Magnesium 1.9 1.8 - 2.4 mg/dL   EKG 12 Lead    Collection Time: 06/22/22 11:19 AM   Result Value Ref Range    Ventricular Rate 69 BPM    Atrial Rate 69 BPM    P-R Interval 192 ms    QRS Duration 154 ms    Q-T Interval 440 ms    QTc Calculation (Bazett) 471 ms    P Axis 55 degrees    R Axis -66 degrees    T Axis 134 degrees    Diagnosis Normal sinus rhythm        Other Studies:  XR CHEST PORTABLE    Result Date: 6/18/2022  EXAM: XR CHEST PORTABLE HISTORY: Shortness of Breath. TECHNIQUE: Frontal chest. COMPARISON: None available. FINDINGS: The cardiac silhouette, mediastinum, and pulmonary vasculature are within normal limits. There is no pneumothorax. Infiltrates are seen in the right upper and lower lung and in the left lower lung. There is pleural thickening along the right chest wall. There there is no appreciable pleural effusion No significant osseous abnormalities are observed. Bilateral lung infiltrates as described above may represent pneumonia.        Current Meds:  Current Facility-Administered Medications   Medication Dose Route Frequency    albuterol (PROVENTIL) nebulizer solution 2.5 mg  2.5 mg Nebulization Q6H PRN    potassium chloride 20 mEq/50 mL IVPB (Central Line)  20 mEq IntraVENous PRN    Or    potassium chloride 10 mEq/100 mL IVPB (Peripheral Line)  10 mEq IntraVENous PRN    magnesium sulfate 2000 mg in 50 mL IVPB premix  2,000 mg IntraVENous PRN    dofetilide (TIKOSYN) capsule 250 mcg  250 mcg Oral 2 times per day    diazePAM (VALIUM) tablet 5 mg  5 mg Oral Q6H PRN    doxycycline hyclate (VIBRAMYCIN) capsule 100 mg  100 mg Oral 2 times per day    apixaban (ELIQUIS) tablet 5 mg  5 mg Oral BID    aspirin EC tablet 81 mg  81 mg Oral Daily    atorvastatin (LIPITOR) tablet 40 mg  40 mg Oral Nightly    azelastine HCl 0.15 % SOLN 1 spray- pt supply (Patient Supplied)  1 spray Nasal Daily    fenofibrate (TRIGLIDE) tablet 160 mg  160 mg Oral Daily    fluticasone (FLONASE) 50 MCG/ACT nasal spray 1 spray  1 spray Each Nostril Daily    lisinopril (PRINIVIL;ZESTRIL) tablet 30 mg  30 mg Oral Daily    magnesium oxide (MAG-OX) tablet 400 mg  400 mg Oral Daily    metoprolol succinate (TOPROL XL) extended release tablet 100 mg  100 mg Oral BID    montelukast (SINGULAIR) tablet 10 mg  10 mg Oral Nightly    albuterol sulfate HFA (PROVENTIL;VENTOLIN;PROAIR) 108 (90 Base) MCG/ACT inhaler 1 puff  1 puff Inhalation Q4H PRN    sodium chloride flush 0.9 % injection 5-40 mL  5-40 mL IntraVENous 2 times per day    sodium chloride flush 0.9 % injection 5-40 mL  5-40 mL IntraVENous PRN    0.9 % sodium chloride infusion   IntraVENous PRN    ondansetron (ZOFRAN-ODT) disintegrating tablet 4 mg  4 mg Oral Q8H PRN    Or    ondansetron (ZOFRAN) injection 4 mg  4 mg IntraVENous Q6H PRN    polyethylene glycol (GLYCOLAX) packet 17 g  17 g Oral Daily PRN    acetaminophen (TYLENOL) tablet 650 mg  650 mg Oral Q6H PRN    Or    acetaminophen (TYLENOL) suppository 650 mg  650 mg Rectal Q6H PRN    dilTIAZem 100 mg in sodium chloride 0.9 % 100 mL infusion (ADD-[place])  2.5-15 mg/hr IntraVENous Continuous    guaiFENesin-dextromethorphan (ROBITUSSIN DM) 100-10 MG/5ML syrup 5 mL  5 mL Oral Q4H PRN    temazepam (RESTORIL) capsule 15 mg  15 mg Oral Nightly PRN       Discharge Plan:     Location: Home  Days: 1-2 days  PPD Ordered: N/A    Signed:  Lin Sandifer, DO    Part of this note may have been written by using a voice dictation software. The note has been proof read but may still contain some grammatical/other typographical errors.

## 2022-06-22 NOTE — PROGRESS NOTES
Pt has a 250 mcg dose of Tikosyn due tonight. Pt took a 500 mcg dose this AM but dose was halved today. Pt's QTc from 1336 was 585. STANISLAW Glaser notified. Per NP, OK to give evening dose of 250 mcg.

## 2022-06-23 ENCOUNTER — TELEPHONE (OUTPATIENT)
Dept: CARDIOLOGY CLINIC | Age: 72
End: 2022-06-23

## 2022-06-23 LAB
ANION GAP SERPL CALC-SCNC: 7 MMOL/L (ref 7–16)
BUN SERPL-MCNC: 14 MG/DL (ref 8–23)
CALCIUM SERPL-MCNC: 8.6 MG/DL (ref 8.3–10.4)
CHLORIDE SERPL-SCNC: 108 MMOL/L (ref 98–107)
CO2 SERPL-SCNC: 27 MMOL/L (ref 21–32)
CREAT SERPL-MCNC: 1.1 MG/DL (ref 0.8–1.5)
EKG ATRIAL RATE: 105 BPM
EKG ATRIAL RATE: 68 BPM
EKG DIAGNOSIS: NORMAL
EKG DIAGNOSIS: NORMAL
EKG P AXIS: 23 DEGREES
EKG P-R INTERVAL: 192 MS
EKG Q-T INTERVAL: 418 MS
EKG Q-T INTERVAL: 440 MS
EKG QRS DURATION: 150 MS
EKG QRS DURATION: 150 MS
EKG QTC CALCULATION (BAZETT): 467 MS
EKG QTC CALCULATION (BAZETT): 552 MS
EKG R AXIS: 45 DEGREES
EKG R AXIS: 80 DEGREES
EKG T AXIS: -7 DEGREES
EKG T AXIS: 70 DEGREES
EKG VENTRICULAR RATE: 105 BPM
EKG VENTRICULAR RATE: 68 BPM
GLUCOSE SERPL-MCNC: 170 MG/DL (ref 65–100)
MAGNESIUM SERPL-MCNC: 1.9 MG/DL (ref 1.8–2.4)
POTASSIUM SERPL-SCNC: 4.1 MMOL/L (ref 3.5–5.1)
SODIUM SERPL-SCNC: 142 MMOL/L (ref 136–145)

## 2022-06-23 PROCEDURE — 6370000000 HC RX 637 (ALT 250 FOR IP): Performed by: HOSPITALIST

## 2022-06-23 PROCEDURE — 2580000003 HC RX 258: Performed by: FAMILY MEDICINE

## 2022-06-23 PROCEDURE — 80048 BASIC METABOLIC PNL TOTAL CA: CPT

## 2022-06-23 PROCEDURE — 36415 COLL VENOUS BLD VENIPUNCTURE: CPT

## 2022-06-23 PROCEDURE — 83735 ASSAY OF MAGNESIUM: CPT

## 2022-06-23 PROCEDURE — 6370000000 HC RX 637 (ALT 250 FOR IP): Performed by: INTERNAL MEDICINE

## 2022-06-23 PROCEDURE — 6370000000 HC RX 637 (ALT 250 FOR IP): Performed by: FAMILY MEDICINE

## 2022-06-23 PROCEDURE — 93005 ELECTROCARDIOGRAM TRACING: CPT | Performed by: INTERNAL MEDICINE

## 2022-06-23 PROCEDURE — 1100000003 HC PRIVATE W/ TELEMETRY

## 2022-06-23 PROCEDURE — 99232 SBSQ HOSP IP/OBS MODERATE 35: CPT | Performed by: INTERNAL MEDICINE

## 2022-06-23 RX ORDER — FAMOTIDINE 20 MG/1
20 TABLET, FILM COATED ORAL DAILY
Status: DISCONTINUED | OUTPATIENT
Start: 2022-06-23 | End: 2022-06-24 | Stop reason: HOSPADM

## 2022-06-23 RX ADMIN — METOPROLOL SUCCINATE 100 MG: 100 TABLET, EXTENDED RELEASE ORAL at 20:19

## 2022-06-23 RX ADMIN — DIPHENHYDRAMINE HCL 25 MG: 25 CAPSULE ORAL at 00:10

## 2022-06-23 RX ADMIN — METOPROLOL SUCCINATE 100 MG: 100 TABLET, EXTENDED RELEASE ORAL at 08:23

## 2022-06-23 RX ADMIN — ASPIRIN 81 MG: 81 TABLET ORAL at 08:22

## 2022-06-23 RX ADMIN — DOXYCYCLINE HYCLATE 100 MG: 100 CAPSULE ORAL at 20:49

## 2022-06-23 RX ADMIN — MONTELUKAST 10 MG: 10 TABLET, FILM COATED ORAL at 20:19

## 2022-06-23 RX ADMIN — SODIUM CHLORIDE, PRESERVATIVE FREE 10 ML: 5 INJECTION INTRAVENOUS at 20:25

## 2022-06-23 RX ADMIN — FLUTICASONE PROPIONATE 1 SPRAY: 50 SPRAY, METERED NASAL at 08:25

## 2022-06-23 RX ADMIN — SODIUM CHLORIDE, PRESERVATIVE FREE 10 ML: 5 INJECTION INTRAVENOUS at 08:25

## 2022-06-23 RX ADMIN — FENOFIBRATE 160 MG: 160 TABLET ORAL at 08:23

## 2022-06-23 RX ADMIN — APIXABAN 5 MG: 5 TABLET, FILM COATED ORAL at 20:49

## 2022-06-23 RX ADMIN — Medication 400 MG: at 08:23

## 2022-06-23 RX ADMIN — DOFETILIDE 250 MCG: 0.25 CAPSULE ORAL at 20:19

## 2022-06-23 RX ADMIN — DOXYCYCLINE HYCLATE 100 MG: 100 CAPSULE ORAL at 08:23

## 2022-06-23 RX ADMIN — DOFETILIDE 250 MCG: 0.25 CAPSULE ORAL at 08:22

## 2022-06-23 RX ADMIN — FAMOTIDINE 20 MG: 20 TABLET ORAL at 09:38

## 2022-06-23 RX ADMIN — LISINOPRIL 30 MG: 20 TABLET ORAL at 08:23

## 2022-06-23 RX ADMIN — ATORVASTATIN CALCIUM 40 MG: 40 TABLET, FILM COATED ORAL at 20:19

## 2022-06-23 RX ADMIN — APIXABAN 5 MG: 5 TABLET, FILM COATED ORAL at 08:22

## 2022-06-23 RX ADMIN — AZELASTINE HCL 1 SPRAY: 205.5 SPRAY NASAL at 08:25

## 2022-06-23 ASSESSMENT — PAIN SCALES - GENERAL
PAINLEVEL_OUTOF10: 0

## 2022-06-23 NOTE — PROGRESS NOTES
Physician Progress Note      Steffen Stark  [facility] #:                  [id number]  :                       1950  ADMIT DATE:       2022 11:52 PM  [address] [place] DATE:  RESPONDING  PROVIDER #:        Mago FELIX DO          QUERY TEXT:    Patient admitted with sepsis. Noted documentation of acute respiratory   failure in H&P on 22. In order to support the diagnosis of acute   respiratory failure, please include additional clinical indicators in your   documentation. Or please document if the diagnosis of acute respiratory   failure has been ruled out after further study. The medical record reflects the following:  Risk Factors: sepsis, PNA  Clinical Indicators: \"LUNGS: no accessory muscle use. \", \"Lungs: CTAB. No   wheezing, rhonchi, or rales. Respirations even, unlabored\"  Treatment: 02 @ 2L N/C    Acute Respiratory Failure Clinical Indicators per [employer] MS-DRG Training Guide and   Quick Reference Guide:  pO2 < 60 mmHg or SpO2 (pulse oximetry) < 91% breathing room air  pCO2 > 50 and pH < 7.35  P/F ratio (pO2 / FIO2) < 300  pO2 decrease or pCO2 increase by 10 mmHg from baseline (if known)  Supplemental oxygen of 40% or more  Presence of respiratory distress, tachypnea, dyspnea, shortness of breath,   wheezing  Unable to speak in complete sentences  Use of accessory muscles to breathe  Extreme anxiety and feeling of impending doom  Tripod position  Confusion/altered mental status/obtunded  email: [email or web address]  Options provided:  -- Acute Respiratory Failure as evidenced by, Please document evidence. -- Acute Respiratory Failure ruled out after study  -- Other - I will add my own diagnosis  -- Disagree - Not applicable / Not valid  -- Disagree - Clinically unable to determine / Unknown  -- Refer to Clinical Documentation Reviewer    PROVIDER RESPONSE TEXT:    Acute Respiratory Failure has been ruled out after study.     Query created by: Jesus Manuel Leger on 2022 3:04

## 2022-06-23 NOTE — PROGRESS NOTES
Hospitalist Progress Note   Admit Date:  2022 11:52 PM   Name:  Lisha Tsang   Age:  80 y.o. Sex:  male  :  1950   MRN:  [id number]   Room:  [facility]/    Presenting Complaint: Leg Swelling and Cough     Reason(s) for Admission: Peripheral edema [R60.9]  Atrial fibrillation with RVR ([address] [I48.91]  Sepsis ([address] [A41.9]  Pneumonia of both lungs due to infectious organism, unspecified part of lung [J18.9]  Sepsis with acute hypoxic respiratory failure and septic shock, due to unspecified organism ([address] [A41.9, R65.21, J96.01]     Hospital Course & Interval History:     80 y. o. male with medical history of atrial fibrillation with scheduled outpatient cardioversion on , HTN, CKD presented to ED on 22 with worsening SOB and cough with associated fevers and chills x 3-4 days.  States that he saw an outpatient doctor about 3 days prior, who checked a BNP and when it resulted ~300, he was started on PO lasix.  Despite taking the Lasix, his SOB worsened. Upon ER evaluation, patient was tachycardic and hypotensive.  CXR shows B infiltrates.  WBC is 12. 6.  pBNP is elevated at 3,566 and with BLE edema.  Last, recent echo from 2022 shows mildly reduced systolic function with EF of 45-50%.  He has known afib (and is currently in RVR), and is scheduled for CARDIOVERSION on Monday. He was given IVFs and antibiotics in the ER. His BP initially improved then dropped so he was admitted to the ICU on Phenylephrine and Cardizem gtt. His BP normalized. He was sent to the floor. He was started on Tikosyn and changed to Doxycycline. He converted to NSR on . Subjective/24hr Events (22): Patient back in afib today. Admits to being discouraged. Says he \"hasn't seen a doctor\" in two days. Tried to correct him to tell him that both cardiology and  have been seeing him but he still doesn't believe me. Denies chest pain, pressure, dyspnea. Had palpitations last night. Wants to go home. Assessment & Plan:     Septic shock ([facility] (Resolved)  - Likely due to pneumonia and rapid Afib  - Was on pressors initially  - Now off pressors since 6/18  - Continue Doxycycline PO - EOT 6/25  - Not on IVFs with fluid overload  - Blood cultures negative  - UA normal  - Procal normal  - Lactic acid normal     Active Problems:    Atrial fibrillation with rapid ventricular response (HCC) (Resolved)  - Likely exacerbated by pneumonia and sepsis  - Converted to NSR on 6/21  - Tikosyn started  On 6/21  - Continue Eliquis  - Stopped Cardizem gtt  - Cardiology following    6/23 - back in Afib this AM. Plans to continue tikosyn per protocol. NPO A/M if remains afib for poss DCCV in AM per cardiology       Bilateral pneumonia  - CXR with multilobar infilatrates  - Concerning for edema vs atypical pneumonia  - CT Chest with bilateral atypical pneumonia  - Continue Doxycycline PO - EOT 6/25       Essential hypertension, benign  - Hypotension improving  - Continue Toprol  - Continue Lisinopril       Chronic systolic congestive heart failure (HCC)  - EF 45% on previos ECHO  - 6/19 ECHO now with EF 30%-35%  - Cardiology following       Paroxysmal atrial fibrillation ([facility]  - See above       Edema of both lower legs  - Due to fluid overload  - Stopped IV Lasix       Atherosclerosis of native coronary artery of native heart without angina pectoris       LBBB (left bundle branch block)       Chronic renal disease, stage III (HCC)  - Creatinine at baseline       High cholesterol      Discharge Planning:    Pending response to tikosyn and ?need for DCCV    Diet:  ADULT DIET;  Regular; Low Fat/Low Chol/High Fiber/2 gm Na  Diet NPO  DVT PPx: eliquis  Code status: Full Code    Hospital Problems:  Principal Problem:    Sepsis with acute hypoxic respiratory failure and septic shock (HCC)  Active Problems:    Edema of both lower legs    Atrial fibrillation with rapid ventricular response (HCC)    High cholesterol    Bilateral pneumonia Essential hypertension, benign    Atherosclerosis of native coronary artery of native heart without angina pectoris    Chronic systolic congestive heart failure (HCC)    LBBB (left bundle branch block)    Chronic renal disease, stage III (HCC)    Paroxysmal atrial fibrillation ([facility]  Resolved Problems:    * No resolved hospital problems. *      Objective:     Patient Vitals for the past 24 hrs:   Temp Pulse Resp BP SpO2   06/23/22 1200 98 °F (36.7 °C) (!) 104 20 (!) 132/99 95 %   06/23/22 0823 -- (!) 114 -- -- --   06/23/22 0800 98.1 °F (36.7 °C) (!) 108 20 (!) 124/95 94 %   06/23/22 0458 98.2 °F (36.8 °C) (!) 112 20 (!) 121/99 91 %   06/23/22 0026 98.6 °F (37 °C) 71 20 123/77 91 %   06/22/22 2023 -- -- -- 115/70 --   06/22/22 1948 98.1 °F (36.7 °C) 77 20 123/81 90 %   06/22/22 1727 98.4 °F (36.9 °C) 73 20 113/79 94 %       Oxygen Therapy  SpO2: 95 %  Pulse Oximetry Type: Continuous  Pulse via Oximetry: 112 beats per minute  SPO2 Low Alarm Limit POX: 89  Pulse Oximeter Device Mode: Other (Comment)  Pulse Oximeter Device Location: Left,Finger  O2 Device: None (Room air)  Skin Assessment: Clean, dry, & intact  Skin Protection for O2 Device: No  O2 Flow Rate (L/min): 2 L/min    Estimated body mass index is 32.52 kg/m² as calculated from the following:    Height as of this encounter: 5' 9\" (1.753 m). Weight as of this encounter: 220 lb 3.2 oz (99.9 kg). Intake/Output Summary (Last 24 hours) at 6/23/2022 1607  Last data filed at 6/23/2022 5437  Gross per 24 hour   Intake 240 ml   Output 1100 ml   Net -860 ml         Physical Exam:     Blood pressure (!) 132/99, pulse (!) 104, temperature 98 °F (36.7 °C), temperature source Oral, resp. rate 20, height 5' 9\" (1.753 m), weight 220 lb 3.2 oz (99.9 kg), SpO2 95 %. General:    Well nourished. Head:  Normocephalic, atraumatic  Eyes:  Sclerae appear normal.  Pupils equally round. ENT:  Nares appear normal, no drainage. Moist oral mucosa  Neck:  No restricted ROM. Trachea midline   CV:   RRR. No m/r/g. No jugular venous distension. Lungs:   CTAB. No wheezing, rhonchi, or rales. Respirations even, unlabored  Abdomen: Bowel sounds present. Soft, nontender, nondistended. Extremities: No cyanosis or clubbing. No edema  Skin:     No rashes and normal coloration. Warm and dry. Neuro:  CN II-XII grossly intact. Sensation intact. A&Ox3  Psych:  Normal mood and affect.       I have reviewed ordered lab tests and independently visualized imaging below:    Recent Labs:  Recent Results (from the past 48 hour(s))   EKG 12 Lead    Collection Time: 06/21/22 11:58 PM   Result Value Ref Range    Ventricular Rate 70 BPM    Atrial Rate 70 BPM    P-R Interval 182 ms    QRS Duration 150 ms    Q-T Interval 474 ms    QTc Calculation (Bazett) 511 ms    P Axis 45 degrees    R Axis 71 degrees    T Axis 60 degrees    Diagnosis Normal sinus rhythm    CBC with Auto Differential    Collection Time: 06/22/22  6:01 AM   Result Value Ref Range    WBC 8.2 4.3 - 11.1 K/uL    RBC 4.07 (L) 4.23 - 5.6 M/uL    Hemoglobin 11.6 (L) 13.6 - 17.2 g/dL    Hematocrit 37.8 (L) 41.1 - 50.3 %    MCV 92.9 79.6 - 97.8 FL    MCH 28.5 26.1 - 32.9 PG    MCHC 30.7 (L) 31.4 - 35.0 g/dL    RDW 14.2 11.9 - 14.6 %    Platelets 738 165 - 985 K/uL    MPV 9.3 (L) 9.4 - 12.3 FL    nRBC 0.00 0.0 - 0.2 K/uL    Differential Type AUTOMATED      Seg Neutrophils 73 43 - 78 %    Lymphocytes 17 13 - 44 %    Monocytes 6 4.0 - 12.0 %    Eosinophils % 2 0.5 - 7.8 %    Basophils 1 0.0 - 2.0 %    Immature Granulocytes 1 0.0 - 5.0 %    Segs Absolute 6.1 1.7 - 8.2 K/UL    Absolute Lymph # 1.4 0.5 - 4.6 K/UL    Absolute Mono # 0.5 0.1 - 1.3 K/UL    Absolute Eos # 0.2 0.0 - 0.8 K/UL    Basophils Absolute 0.0 0.0 - 0.2 K/UL    Absolute Immature Granulocyte 0.1 0.0 - 0.5 K/UL   Basic Metabolic Panel    Collection Time: 06/22/22  7:01 AM   Result Value Ref Range    Sodium 141 138 - 145 mmol/L    Potassium 4.4 3.5 - 5.1 mmol/L    Chloride 106 98 - 107 mmol/L    CO2 30 21 - 32 mmol/L    Anion Gap 5 (L) 7 - 16 mmol/L    Glucose 135 (H) 65 - 100 mg/dL    BUN 19 8 - 23 MG/DL    CREATININE 1.20 0.8 - 1.5 MG/DL    GFR African American >60 >60 ml/min/1.73m2    GFR Non- >60 >60 ml/min/1.73m2    Calcium 8.0 (L) 8.3 - 10.4 MG/DL   Magnesium    Collection Time: 06/22/22  7:01 AM   Result Value Ref Range    Magnesium 1.9 1.8 - 2.4 mg/dL   EKG 12 Lead    Collection Time: 06/22/22 11:19 AM   Result Value Ref Range    Ventricular Rate 69 BPM    Atrial Rate 69 BPM    P-R Interval 192 ms    QRS Duration 154 ms    Q-T Interval 440 ms    QTc Calculation (Bazett) 471 ms    P Axis 55 degrees    R Axis -66 degrees    T Axis 134 degrees    Diagnosis Normal sinus rhythm    EKG 12 Lead    Collection Time: 06/22/22 10:25 PM   Result Value Ref Range    Ventricular Rate 68 BPM    Atrial Rate 68 BPM    P-R Interval 192 ms    QRS Duration 150 ms    Q-T Interval 440 ms    QTc Calculation (Bazett) 467 ms    P Axis 23 degrees    R Axis 45 degrees    T Axis 70 degrees    Diagnosis Sinus rhythm with blocked PACs    Basic Metabolic Panel    Collection Time: 06/23/22  3:47 AM   Result Value Ref Range    Sodium 142 136 - 145 mmol/L    Potassium 4.1 3.5 - 5.1 mmol/L    Chloride 108 (H) 98 - 107 mmol/L    CO2 27 21 - 32 mmol/L    Anion Gap 7 7 - 16 mmol/L    Glucose 170 (H) 65 - 100 mg/dL    BUN 14 8 - 23 MG/DL    CREATININE 1.10 0.8 - 1.5 MG/DL    GFR African American >60 >60 ml/min/1.73m2    GFR Non- >60 >60 ml/min/1.73m2    Calcium 8.6 8.3 - 10.4 MG/DL   Magnesium    Collection Time: 06/23/22  3:47 AM   Result Value Ref Range    Magnesium 1.9 1.8 - 2.4 mg/dL   EKG 12 Lead    Collection Time: 06/23/22 10:31 AM   Result Value Ref Range    Ventricular Rate 105 BPM    Atrial Rate 105 BPM    QRS Duration 150 ms    Q-T Interval 418 ms    QTc Calculation (Bazett) 552 ms    R Axis 80 degrees    T Axis -7 degrees    Diagnosis       Atrial fibrillation with rapid ventricular response       Other Studies:  CT CHEST W CONTRAST   Final Result   Multilobar atypical pneumonia         XR CHEST PORTABLE   Final Result   Bilateral lung infiltrates as described above may represent pneumonia.              Current Meds:  Current Facility-Administered Medications   Medication Dose Route Frequency    famotidine (PEPCID) tablet 20 mg  20 mg Oral Daily    albuterol (PROVENTIL) nebulizer solution 2.5 mg  2.5 mg Nebulization Q6H PRN    diphenhydrAMINE (BENADRYL) capsule 25 mg  25 mg Oral Q6H PRN    potassium chloride 20 mEq/50 mL IVPB (Central Line)  20 mEq IntraVENous PRN    Or    potassium chloride 10 mEq/100 mL IVPB (Peripheral Line)  10 mEq IntraVENous PRN    magnesium sulfate 2000 mg in 50 mL IVPB premix  2,000 mg IntraVENous PRN    dofetilide (TIKOSYN) capsule 250 mcg  250 mcg Oral 2 times per day    diazePAM (VALIUM) tablet 5 mg  5 mg Oral Q6H PRN    doxycycline hyclate (VIBRAMYCIN) capsule 100 mg  100 mg Oral 2 times per day    apixaban (ELIQUIS) tablet 5 mg  5 mg Oral BID    aspirin EC tablet 81 mg  81 mg Oral Daily    atorvastatin (LIPITOR) tablet 40 mg  40 mg Oral Nightly    azelastine HCl 0.15 % SOLN 1 spray- pt supply (Patient Supplied)  1 spray Nasal Daily    fenofibrate (TRIGLIDE) tablet 160 mg  160 mg Oral Daily    fluticasone (FLONASE) 50 MCG/ACT nasal spray 1 spray  1 spray Each Nostril Daily    lisinopril (PRINIVIL;ZESTRIL) tablet 30 mg  30 mg Oral Daily    magnesium oxide (MAG-OX) tablet 400 mg  400 mg Oral Daily    metoprolol succinate (TOPROL XL) extended release tablet 100 mg  100 mg Oral BID    montelukast (SINGULAIR) tablet 10 mg  10 mg Oral Nightly    albuterol sulfate HFA (PROVENTIL;VENTOLIN;PROAIR) 108 (90 Base) MCG/ACT inhaler 1 puff  1 puff Inhalation Q4H PRN    sodium chloride flush 0.9 % injection 5-40 mL  5-40 mL IntraVENous 2 times per day    sodium chloride flush 0.9 % injection 5-40 mL  5-40 mL IntraVENous PRN    0.9 % sodium chloride infusion   IntraVENous PRN    ondansetron (ZOFRAN-ODT) disintegrating tablet 4 mg  4 mg Oral Q8H PRN    Or    ondansetron (ZOFRAN) injection 4 mg  4 mg IntraVENous Q6H PRN    polyethylene glycol (GLYCOLAX) packet 17 g  17 g Oral Daily PRN    acetaminophen (TYLENOL) tablet 650 mg  650 mg Oral Q6H PRN    Or    acetaminophen (TYLENOL) suppository 650 mg  650 mg Rectal Q6H PRN    dilTIAZem 100 mg in sodium chloride 0.9 % 100 mL infusion (ADD[place])  2.5-15 mg/hr IntraVENous Continuous    guaiFENesin-dextromethorphan (ROBITUSSIN DM) 100-10 MG/5ML syrup 5 mL  5 mL Oral Q4H PRN    temazepam (RESTORIL) capsule 15 mg  15 mg Oral Nightly PRN       Signed:  Saddie Apgar, DO    Part of this note may have been written by using a voice dictation software. The note has been proof read but may still contain some grammatical/other typographical errors.

## 2022-06-23 NOTE — TELEPHONE ENCOUNTER
Pt asking to see Dr. Delilah Brar. Is currently admitted and claims he has been asking to see Dr. Delilah Brar for 2 days and hasn't seen him. Explained we cannot be involved in pt's care until we are invited by the admitting physician. Pt is frustrated and states he is ready to walk out. He has been \"laying here for 6 days and I'm not laying here all day in afib again waiting on him. \"

## 2022-06-23 NOTE — PROGRESS NOTES
[facility] [place] CARDIOLOGY PROGRESS NOTE           6/23/2022 7:14 AM    Admit Date: 6/18/2022    Admit Diagnosis: Peripheral edema [R60.9]  Atrial fibrillation with RVR (HCC) [I48.91]  Sepsis ([address] [A41.9]  Pneumonia of both lungs due to infectious organism, unspecified part of lung [J18.9]  Sepsis with acute hypoxic respiratory failure and septic shock, due to unspecified organism ([address] [A41.9, R65.21, J96.01]    Assessment:   Principal Problem:    Sepsis with acute hypoxic respiratory failure and septic shock ([address]  Active Problems:    Edema of both lower legs    Atrial fibrillation with rapid ventricular response (HCC)    High cholesterol    Bilateral pneumonia    Essential hypertension, benign    Atherosclerosis of native coronary artery of native heart without angina pectoris    Chronic systolic congestive heart failure (HCC)    LBBB (left bundle branch block)    Chronic renal disease, stage III (HCC)    Paroxysmal atrial fibrillation ([address]  Resolved Problems:    * No resolved hospital problems. *      Plan:     80year old male with a history of persistent AF/AFL s/p AF and AFL ablations admitted with AF and atypical PNA.       -AF - s/p for AF ablation. Continue Eliquis and metoprolol. On Tikosyn protocol and converted to NSR after 1st dose, back in AF 6/23. Reduced to 250 mcg po BID 2/2 prolonged QTc. QTc stable this AM. Continue protocol as scheduled. -NPO p MN for possible DCCV if remains in AF. -Atypical PNA - continue abx per medicine.     -HFrEF  - continue GDMT.   -Dispo - anticipate tomorrow after Tikosyn induction complete. (May need DCCV). Thank you for allowing me to participate in the electrophysiologic care of this most pleasant patient. Please feel free to contact me if there are any questions or concerns. Nallely Broussard.  Amna Dawkins MD, MS  Clinical Cardiac Electrophysiology  [facility] Cardiology    Subjective:   No complaints this AM, no chest pain or shortness of breath    Interval History: (History of pertinent interval events obtained from nursing staff)    ROS:  GEN:  No fever or chills  Cardiovascular:  As noted above  Pulmonary:  As noted above  Neuro:  No new focal motor or sensory loss      Objective:     Vitals:    06/22/22 1948 06/22/22 2023 06/23/22 0026 06/23/22 0458   BP: 123/81 115/70 123/77 (!) 121/99   Pulse: 77  71 (!) 112   Resp: 20  20 20   Temp: 98.1 °F (36.7 °C)  98.6 °F (37 °C) 98.2 °F (36.8 °C)   TempSrc: Oral  Oral Oral   SpO2: 90%  91% 91%   Weight:    220 lb 3.2 oz (99.9 kg)   Height:           Physical Exam:  General-Well Developed, Well Nourished, No Acute Distress, Alert & Oriented x 3, appropriate mood. Neck- supple, no JVD  CV- IRIR, tachy, no MRG  Lung- clear bilaterally  Abd- soft, nontender, nondistended  Ext- no edema bilaterally.   Skin- warm and dry    Current Facility-Administered Medications   Medication Dose Route Frequency    albuterol (PROVENTIL) nebulizer solution 2.5 mg  2.5 mg Nebulization Q6H PRN    diphenhydrAMINE (BENADRYL) capsule 25 mg  25 mg Oral Q6H PRN    potassium chloride 20 mEq/50 mL IVPB (Central Line)  20 mEq IntraVENous PRN    Or    potassium chloride 10 mEq/100 mL IVPB (Peripheral Line)  10 mEq IntraVENous PRN    magnesium sulfate 2000 mg in 50 mL IVPB premix  2,000 mg IntraVENous PRN    dofetilide (TIKOSYN) capsule 250 mcg  250 mcg Oral 2 times per day    diazePAM (VALIUM) tablet 5 mg  5 mg Oral Q6H PRN    doxycycline hyclate (VIBRAMYCIN) capsule 100 mg  100 mg Oral 2 times per day    apixaban (ELIQUIS) tablet 5 mg  5 mg Oral BID    aspirin EC tablet 81 mg  81 mg Oral Daily    atorvastatin (LIPITOR) tablet 40 mg  40 mg Oral Nightly    azelastine HCl 0.15 % SOLN 1 spray- pt supply (Patient Supplied)  1 spray Nasal Daily    fenofibrate (TRIGLIDE) tablet 160 mg  160 mg Oral Daily    fluticasone (FLONASE) 50 MCG/ACT nasal spray 1 spray  1 spray Each Nostril Daily    lisinopril (PRINIVIL;ZESTRIL) tablet 30 mg  30 mg Oral Daily    magnesium oxide (MAG-OX) tablet 400 mg  400 mg Oral Daily    metoprolol succinate (TOPROL XL) extended release tablet 100 mg  100 mg Oral BID    montelukast (SINGULAIR) tablet 10 mg  10 mg Oral Nightly    albuterol sulfate HFA (PROVENTIL;VENTOLIN;PROAIR) 108 (90 Base) MCG/ACT inhaler 1 puff  1 puff Inhalation Q4H PRN    sodium chloride flush 0.9 % injection 5-40 mL  5-40 mL IntraVENous 2 times per day    sodium chloride flush 0.9 % injection 5-40 mL  5-40 mL IntraVENous PRN    0.9 % sodium chloride infusion   IntraVENous PRN    ondansetron (ZOFRAN-ODT) disintegrating tablet 4 mg  4 mg Oral Q8H PRN    Or    ondansetron (ZOFRAN) injection 4 mg  4 mg IntraVENous Q6H PRN    polyethylene glycol (GLYCOLAX) packet 17 g  17 g Oral Daily PRN    acetaminophen (TYLENOL) tablet 650 mg  650 mg Oral Q6H PRN    Or    acetaminophen (TYLENOL) suppository 650 mg  650 mg Rectal Q6H PRN    dilTIAZem 100 mg in sodium chloride 0.9 % 100 mL infusion (ADD-[place])  2.5-15 mg/hr IntraVENous Continuous    guaiFENesin-dextromethorphan (ROBITUSSIN DM) 100-10 MG/5ML syrup 5 mL  5 mL Oral Q4H PRN    temazepam (RESTORIL) capsule 15 mg  15 mg Oral Nightly PRN       Data Review:   Recent Results (from the past 24 hour(s))   EKG 12 Lead    Collection Time: 06/22/22 11:19 AM   Result Value Ref Range    Ventricular Rate 69 BPM    Atrial Rate 69 BPM    P-R Interval 192 ms    QRS Duration 154 ms    Q-T Interval 440 ms    QTc Calculation (Bazett) 471 ms    P Axis 55 degrees    R Axis -66 degrees    T Axis 134 degrees    Diagnosis Normal sinus rhythm    EKG 12 Lead    Collection Time: 06/22/22 10:25 PM   Result Value Ref Range    Ventricular Rate 68 BPM    Atrial Rate 68 BPM    P-R Interval 192 ms    QRS Duration 150 ms    Q-T Interval 440 ms    QTc Calculation (Bazett) 467 ms    P Axis 23 degrees    R Axis 45 degrees    T Axis 70 degrees    Diagnosis Sinus rhythm with marked sinus arrhythmia    Basic Metabolic Panel

## 2022-06-24 VITALS
HEIGHT: 69 IN | SYSTOLIC BLOOD PRESSURE: 132 MMHG | TEMPERATURE: 98.2 F | BODY MASS INDEX: 32.05 KG/M2 | OXYGEN SATURATION: 96 % | DIASTOLIC BLOOD PRESSURE: 98 MMHG | RESPIRATION RATE: 20 BRPM | WEIGHT: 216.4 LBS | HEART RATE: 81 BPM

## 2022-06-24 LAB
ANION GAP SERPL CALC-SCNC: 3 MMOL/L (ref 7–16)
BACTERIA SPEC CULT: NORMAL
BACTERIA SPEC CULT: NORMAL
BUN SERPL-MCNC: 14 MG/DL (ref 8–23)
CALCIUM SERPL-MCNC: 8.8 MG/DL (ref 8.3–10.4)
CHLORIDE SERPL-SCNC: 107 MMOL/L (ref 98–107)
CO2 SERPL-SCNC: 31 MMOL/L (ref 21–32)
CREAT SERPL-MCNC: 1.2 MG/DL (ref 0.8–1.5)
EKG ATRIAL RATE: 138 BPM
EKG ATRIAL RATE: 75 BPM
EKG DIAGNOSIS: NORMAL
EKG DIAGNOSIS: NORMAL
EKG P AXIS: 48 DEGREES
EKG P-R INTERVAL: 174 MS
EKG Q-T INTERVAL: 414 MS
EKG Q-T INTERVAL: 480 MS
EKG QRS DURATION: 148 MS
EKG QRS DURATION: 148 MS
EKG QTC CALCULATION (BAZETT): 536 MS
EKG QTC CALCULATION (BAZETT): 585 MS
EKG R AXIS: -37 DEGREES
EKG R AXIS: -64 DEGREES
EKG T AXIS: 117 DEGREES
EKG T AXIS: 97 DEGREES
EKG VENTRICULAR RATE: 120 BPM
EKG VENTRICULAR RATE: 75 BPM
GLUCOSE SERPL-MCNC: 149 MG/DL (ref 65–100)
MAGNESIUM SERPL-MCNC: 2.1 MG/DL (ref 1.8–2.4)
POTASSIUM SERPL-SCNC: 4.9 MMOL/L (ref 3.5–5.1)
SERVICE CMNT-IMP: NORMAL
SERVICE CMNT-IMP: NORMAL
SODIUM SERPL-SCNC: 141 MMOL/L (ref 136–145)

## 2022-06-24 PROCEDURE — 6370000000 HC RX 637 (ALT 250 FOR IP): Performed by: INTERNAL MEDICINE

## 2022-06-24 PROCEDURE — 2580000003 HC RX 258: Performed by: FAMILY MEDICINE

## 2022-06-24 PROCEDURE — 99232 SBSQ HOSP IP/OBS MODERATE 35: CPT | Performed by: INTERNAL MEDICINE

## 2022-06-24 PROCEDURE — 6370000000 HC RX 637 (ALT 250 FOR IP): Performed by: FAMILY MEDICINE

## 2022-06-24 PROCEDURE — 36415 COLL VENOUS BLD VENIPUNCTURE: CPT

## 2022-06-24 PROCEDURE — 80048 BASIC METABOLIC PNL TOTAL CA: CPT

## 2022-06-24 PROCEDURE — 83735 ASSAY OF MAGNESIUM: CPT

## 2022-06-24 PROCEDURE — 93005 ELECTROCARDIOGRAM TRACING: CPT | Performed by: INTERNAL MEDICINE

## 2022-06-24 RX ORDER — DOXYCYCLINE HYCLATE 100 MG/1
100 CAPSULE ORAL EVERY 12 HOURS SCHEDULED
Qty: 2 CAPSULE | Refills: 0 | Status: SHIPPED | OUTPATIENT
Start: 2022-06-24 | End: 2022-06-25

## 2022-06-24 RX ORDER — DOFETILIDE 0.25 MG/1
250 CAPSULE ORAL EVERY 12 HOURS SCHEDULED
Qty: 60 CAPSULE | Refills: 3 | Status: SHIPPED | OUTPATIENT
Start: 2022-06-24 | End: 2022-07-22 | Stop reason: SDUPTHER

## 2022-06-24 RX ADMIN — APIXABAN 5 MG: 5 TABLET, FILM COATED ORAL at 08:55

## 2022-06-24 RX ADMIN — FENOFIBRATE 160 MG: 160 TABLET ORAL at 08:55

## 2022-06-24 RX ADMIN — FLUTICASONE PROPIONATE 1 SPRAY: 50 SPRAY, METERED NASAL at 09:01

## 2022-06-24 RX ADMIN — LISINOPRIL 30 MG: 20 TABLET ORAL at 08:54

## 2022-06-24 RX ADMIN — DOFETILIDE 250 MCG: 0.25 CAPSULE ORAL at 08:54

## 2022-06-24 RX ADMIN — SODIUM CHLORIDE, PRESERVATIVE FREE 10 ML: 5 INJECTION INTRAVENOUS at 09:02

## 2022-06-24 RX ADMIN — AZELASTINE HCL 1 SPRAY: 205.5 SPRAY NASAL at 09:02

## 2022-06-24 RX ADMIN — ASPIRIN 81 MG: 81 TABLET ORAL at 08:55

## 2022-06-24 RX ADMIN — DOXYCYCLINE HYCLATE 100 MG: 100 CAPSULE ORAL at 08:55

## 2022-06-24 RX ADMIN — FAMOTIDINE 20 MG: 20 TABLET ORAL at 08:55

## 2022-06-24 RX ADMIN — METOPROLOL SUCCINATE 100 MG: 100 TABLET, EXTENDED RELEASE ORAL at 08:55

## 2022-06-24 RX ADMIN — Medication 400 MG: at 08:55

## 2022-06-24 ASSESSMENT — PAIN SCALES - GENERAL
PAINLEVEL_OUTOF10: 0

## 2022-06-24 NOTE — DISCHARGE SUMMARY
Hospitalist Discharge Summary   Admit Date:  2022 11:52 PM   DC Note date: 2022  Name:  Pascual Bowman   Age:  80 y.o. Sex:  male  :  1950   MRN:  [id number]   Room:  [facility]/  PCP:  Jenny Ambrocio MD    Presenting Complaint: Leg Swelling and Cough     Initial Admission Diagnosis: Peripheral edema [R60.9]  Atrial fibrillation with RVR ([address] [I48.91]  Sepsis ([address] [A41.9]  Pneumonia of both lungs due to infectious organism, unspecified part of lung [J18.9]  Sepsis with acute hypoxic respiratory failure and septic shock, due to unspecified organism ([address] [A41.9, R65.21, J96.01]     Problem List for this Hospitalization (present on admission):    Principal Problem:    Sepsis with acute hypoxic respiratory failure and septic shock ([address]  Active Problems:    Edema of both lower legs    Atrial fibrillation with rapid ventricular response (HCC)    High cholesterol    Bilateral pneumonia    Essential hypertension, benign    Atherosclerosis of native coronary artery of native heart without angina pectoris    Chronic systolic congestive heart failure (HCC)    LBBB (left bundle branch block)    Chronic renal disease, stage III (HCC)    Paroxysmal atrial fibrillation ([address]  Resolved Problems:    * No resolved hospital problems. [facility] Course:  80 y. o. male with medical history of atrial fibrillation with scheduled outpatient cardioversion on , HTN, CKD presented to ED on 22 with worsening SOB and cough with associated fevers and chills x 3-4 days.  States that he saw an outpatient doctor about 3 days prior, who checked a BNP and when it resulted ~300, he was started on PO lasix.  Despite taking the Lasix, his SOB worsened. Upon ER evaluation, patient was tachycardic and hypotensive.  CXR shows B infiltrates.  WBC is 12. 6.  pBNP is elevated at 3,566 and with BLE edema.  Last, recent echo from 2022 shows mildly reduced systolic function with EF of 45-50%.  He has known afib (and is currently in RVR), and is scheduled for CARDIOVERSION on Monday. He was given IVFs and antibiotics in the ER. His BP initially improved then dropped so he was admitted to the ICU on Phenylephrine and Cardizem gtt. His BP normalized. He was sent to the floor. He was started on Tikosyn and changed to Doxycycline. He converted to NSR on 6/21. On day of discharge patient remains in NSR. ECG reviewed by Cardiology. Will continue tikosyn, toprol, lisinopril and eliquis. Pt medically stable to discharge home. Aware to return to hospital for chest pain/pressure/palpitations/lightheadedness or other acute concerning symptoms. Disposition: home  Diet: ADULT DIET; Regular  Code Status: Full Code    Follow Ups:   Follow-up Information     Huma Vyas MD.    Why: for follow up in one week  Contact information:  [address]  [address]  [phone]                       Follow up labs/diagnostics (ultimately defer to outpatient provider):  PCP in 1 week, Cardiology 1-2 weeks      Time spent in patient discharge and coordination 34 minutes. Plan was discussed with patient/RN/cardiology. All questions answered. Patient was stable at time of discharge. Instructions given to call a physician or return if any concerns.     Discharge Medication List as of 6/24/2022  1:52 PM      START taking these medications    Details   dofetilide (TIKOSYN) 250 MCG capsule Take 1 capsule by mouth every 12 hours, Disp-60 capsule, R-3Normal      doxycycline hyclate (VIBRAMYCIN) 100 MG capsule Take 1 capsule by mouth every 12 hours for 2 doses, Disp-2 capsule, R-0Normal         CONTINUE these medications which have NOT CHANGED    Details   albuterol sulfate HFA (PROVENTIL;VENTOLIN;PROAIR) 108 (90 Base) MCG/ACT inhaler Historical Med      apixaban (ELIQUIS) 5 MG TABS tablet Take 5 mg by mouth 2 times dailyHistorical Med      aspirin 81 MG EC tablet Take by mouth dailyHistorical Med      atorvastatin (LIPITOR) 40 MG tablet Take by mouth dailyHistorical Med      azelastine HCl 0.15 % SOLN 1 spray by Nasal route dailyHistorical Med      famotidine (PEPCID) 20 MG tablet TAKE 1 TABLET BY MOUTH ONCE DAILY AT BEDTIME AS NEEDED FOR 90 DAYSHistorical Med      fenofibrate (TRICOR) 145 MG tablet TAKE 1 TABLET BY MOUTH ONCE DAILY FOR 90 DAYSHistorical Med      fluticasone (FLONASE) 50 MCG/ACT nasal spray USE 1 SPRAY(S) IN EACH NOSTRIL TWICE DAILYHistorical Med      lisinopril (PRINIVIL;ZESTRIL) 30 MG tablet Take 30 mg by mouth dailyHistorical Med      magnesium oxide (MAG-OX) 400 (240 Mg) MG tablet Take 400 mg by mouth dailyHistorical Med      metFORMIN (GLUCOPHAGE) 1000 MG tablet Take 1,000 mg by mouth 2 times daily (with meals)Historical Med      metoprolol succinate (TOPROL XL) 100 MG extended release tablet Take 100 mg by mouth 2 times daily Indications: 100 mg am 50 mg pm Historical Med      montelukast (SINGULAIR) 10 MG tablet TAKE 1 TABLET BY MOUTH ONCE DAILY AT BEDTIME FOR ALLERGIES FOR 90 DAYSHistorical Med      sildenafil (REVATIO) 20 MG tablet Take 20 mg by mouth 3 times dailyHistorical Med             Procedures done this admission:  * No surgery found *    Consults this admission:  IP CONSULT TO CARDIOLOGY    Echocardiogram results:  06/18/22    TRANSTHORACIC ECHOCARDIOGRAM (TTE) COMPLETE (CONTRAST/BUBBLE/3D PRN) 06/19/2022  1:49 PM (Final)    Interpretation Summary    Left Ventricle: Severely reduced left ventricular systolic function with a visually estimated EF of 30 - 35%. Left ventricle size is normal. Mildly increased wall thickness. Severe global hypokinesis present. Abnormal diastolic function.   Right Ventricle: Mildly reduced systolic function.   Mitral Valve: Mildly thickened leaflet. Mild annular calcification of the mitral valve. Moderate regurgitation. ERO 0.33 cm2. RVol 35 mL.   Interatrial Septum: Agitated saline study was negative with and without provocation.     Signed by: Jennifer Huddleston MD on 6/19/2022  1:49 PM      Diagnostic Imaging/Tests:   CT CHEST W CONTRAST    Result Date: 6/19/2022  CT of the chest with contrast. CLINICAL INDICATION: Pneumonia, atrial fibrillation PROCEDURE: Serial thin section axial images obtained from the thoracic inlet through the upper abdomen following the administration of 80 cc of Optiray-350 intravenous contrast.  Radiation dose reduction techniques were used for this study. Our CT scanners use one or all of the following: Automated exposure control, adjusted of the mA and/or kV according to patient size, iterative reconstruction COMPARISON:Chest x-ray from the previous day FINDINGS: No mediastinal or axillary adenopathy. There is no pleural or pericardial effusion. Patchy airspace consolidation is noted centrally in the right upper lobe extending peripherally from the jessy with air bronchograms. There is also similar patchy consolidation in the superior segment of the right lower lobe extending more caudally in the central right lower lobe that spares the subpleural lung zones. Tiny irregular nodular opacities noted in the lingula and left lower lobe. Atypical pneumonia is favored. There is no pleural effusion. There is no pneumothorax. Limited evaluation of the upper abdomen is unremarkable. No aggressive osseous is identified. Multilobar atypical pneumonia     XR CHEST PORTABLE    Result Date: 6/18/2022  EXAM: XR CHEST PORTABLE HISTORY: Shortness of Breath. TECHNIQUE: Frontal chest. COMPARISON: None available. FINDINGS: The cardiac silhouette, mediastinum, and pulmonary vasculature are within normal limits. There is no pneumothorax. Infiltrates are seen in the right upper and lower lung and in the left lower lung. There is pleural thickening along the right chest wall. There there is no appreciable pleural effusion No significant osseous abnormalities are observed. Bilateral lung infiltrates as described above may represent pneumonia.      Transthoracic echocardiogram (TTE) complete with contrast, bubble, strain, and 3D PRN    Result Date: 6/19/2022    Left Ventricle: Severely reduced left ventricular systolic function with a visually estimated EF of 30 - 35%. Left ventricle size is normal. Mildly increased wall thickness. Severe global hypokinesis present. Abnormal diastolic function.   Right Ventricle: Mildly reduced systolic function.   Mitral Valve: Mildly thickened leaflet. Mild annular calcification of the mitral valve. Moderate regurgitation. ERO 0.33 cm2. RVol 35 mL.   Interatrial Septum: Agitated saline study was negative with and without provocation. Labs: Results:       BMP, Mg, Phos Recent Labs     06/22/22  0701 06/23/22  0347 06/24/22  0513    142 141   K 4.4 4.1 4.9    108* 107   CO2 30 27 31   BUN 19 14 14   MG 1.9 1.9 2.1      CBC Recent Labs     06/22/22  0601   WBC 8.2   RBC 4.07*   HGB 11.6*   HCT 37.8*         LFT No results for input(s): ALT, TP, ALB, GLOB in the last 72 hours.     Invalid input(s): SGOT, TBIL, AP, AGRAT, GPT   Cardiac Testing No results found for: BNP, CPK, RCK1, CKMB   Coagulation Tests Lab Results   Component Value Date    INR 1.1 05/12/2022      A1c No results found for: HBA1C   Lipid Panel No results found for: CHOL, CHOLPOCT, CHOLX, CHLST, CHOLV, 138383, HDL, HDLC, LDL, LDLC, 785530, VLDLC, VLDL, TGLX, TRIGL   Thyroid Panel No results found for: TSH, T4, FT4     Most Recent UA Lab Results   Component Value Date    MUCUS 0 06/19/2022    UCOM RESULTS VERIFIED MANUALLY 06/19/2022          All Labs from Last 24 Hrs:  Recent Results (from the past 24 hour(s))   EKG 12 Lead    Collection Time: 06/23/22 10:19 PM   Result Value Ref Range    Ventricular Rate 120 BPM    Atrial Rate 138 BPM    QRS Duration 148 ms    Q-T Interval 414 ms    QTc Calculation (Bazett) 585 ms    R Axis -64 degrees    T Axis 97 degrees    Diagnosis       Atrial fibrillation with rapid ventricular response   Magnesium Collection Time: 06/24/22  5:13 AM   Result Value Ref Range    Magnesium 2.1 1.8 - 2.4 mg/dL   Basic Metabolic Panel    Collection Time: 06/24/22  5:13 AM   Result Value Ref Range    Sodium 141 136 - 145 mmol/L    Potassium 4.9 3.5 - 5.1 mmol/L    Chloride 107 98 - 107 mmol/L    CO2 31 21 - 32 mmol/L    Anion Gap 3 (L) 7 - 16 mmol/L    Glucose 149 (H) 65 - 100 mg/dL    BUN 14 8 - 23 MG/DL    CREATININE 1.20 0.8 - 1.5 MG/DL    GFR African American >60 >60 ml/min/1.73m2    GFR Non- >60 >60 ml/min/1.73m2    Calcium 8.8 8.3 - 10.4 MG/DL   EKG 12 Lead    Collection Time: 06/24/22 10:58 AM   Result Value Ref Range    Ventricular Rate 75 BPM    Atrial Rate 75 BPM    P-R Interval 174 ms    QRS Duration 148 ms    Q-T Interval 480 ms    QTc Calculation (Bazett) 536 ms    P Axis 48 degrees    R Axis -37 degrees    T Axis 117 degrees    Diagnosis Normal sinus rhythm        No Known Allergies  Immunization History   Administered Date(s) Administered    COVID-19, Moderna, Primary or Immunocompromised, PF, 100mcg/0.5mL 02/18/2021, 03/18/2021, 01/28/2022       Recent Vital Data:  Patient Vitals for the past 24 hrs:   Temp Pulse Resp BP SpO2   06/24/22 1200 98.2 °F (36.8 °C) 81 20 (!) 132/98 96 %   06/24/22 0855 -- 82 -- -- --   06/24/22 0800 98.3 °F (36.8 °C) 81 20 (!) 131/94 92 %   06/24/22 0442 98.1 °F (36.7 °C) 77 20 (!) 123/91 94 %   06/23/22 2357 98.4 °F (36.9 °C) 57 20 109/89 93 %   06/23/22 2019 -- (!) 119 -- 122/83 --   06/23/22 2013 98.2 °F (36.8 °C) (!) 117 20 120/86 93 %   06/23/22 1700 98 °F (36.7 °C) (!) 102 20 125/87 94 %       Oxygen Therapy  SpO2: 96 %  Pulse Oximetry Type: Continuous  Pulse via Oximetry: 112 beats per minute  SPO2 Low Alarm Limit POX: 89  Pulse Oximeter Device Mode: Other (Comment)  Pulse Oximeter Device Location: Left,Finger  O2 Device: None (Room air)  Skin Assessment: Clean, dry, & intact  Skin Protection for O2 Device: No  O2 Flow Rate (L/min): 2 L/min    Estimated body mass index is 31.96 kg/m² as calculated from the following:    Height as of this encounter: 5' 9\" (1.753 m). Weight as of this encounter: 216 lb 6.4 oz (98.2 kg). Intake/Output Summary (Last 24 hours) at 6/24/2022 1459  Last data filed at 6/24/2022 1200  Gross per 24 hour   Intake 200 ml   Output 1550 ml   Net -1350 ml         Physical Exam:  General:    Well nourished. No overt distress  Head:  Normocephalic, atraumatic  Eyes:  Sclerae appear normal.  Pupils equally round. HENT:  Nares appear normal, no drainage. Moist mucous membranes  Neck:  No restricted ROM. Trachea midline  CV:   RRR. No m/r/g. No JVD  Lungs:   CTAB. No wheezing, rhonchi, or rales. Even, unlabored  Abdomen:   Soft, nontender, nondistended. Extremities: Warm and dry. No cyanosis or clubbing. No edema. Skin:     No rashes. Normal coloration  Neuro:  CN II-XII grossly intact. Psych:  Normal mood and affect. Signed:  Nathalie Ornelas DO    Part of this note may have been written by using a voice dictation software. The note has been proof read but may still contain some grammatical/other typographical errors.

## 2022-06-24 NOTE — CARE COORDINATION
Discharge order is in. Pt is discharging home in stable condition. No d/c needs were identified. Tx goals have been met.       06/24/22 [address] Discharge None   [employer] Information Provided? No   Mode of Transport at Discharge Other (see comment)  (Family)   Confirm Follow Up Transport Family   Condition of Participation: Discharge Planning   The Patient and/or Patient Representative was provided with a Choice of Provider? Patient   The Patient and/Or Patient Representative agree with the Discharge Plan? Yes   Freedom of Choice list was provided with basic dialogue that supports the patient's individualized plan of care/goals, treatment preferences, and shares the quality data associated with the providers?   Yes

## 2022-06-24 NOTE — PLAN OF CARE
Problem: Discharge Planning  Goal: Discharge to home or other facility with appropriate resources  Outcome: Completed  Flowsheets (Taken 6/24/2022 7161 by Jan Back RN)  Discharge to home or other facility with appropriate resources: Identify barriers to discharge with patient and caregiver     Problem: Pain  Goal: Verbalizes/displays adequate comfort level or baseline comfort level  Outcome: Completed     Problem: ABCDS Injury Assessment  Goal: Absence of physical injury  Outcome: Completed     Problem: Safety - Adult  Goal: Free from fall injury  Outcome: Completed
Problem: Pain  Goal: Verbalizes/displays adequate comfort level or baseline comfort level  Outcome: Progressing  Flowsheets (Taken 6/19/2022 1930 by Leo Leslie RN)  Verbalizes/displays adequate comfort level or baseline comfort level: Encourage patient to monitor pain and request assistance     Problem: Discharge Planning  Goal: Discharge to home or other facility with appropriate resources  Recent Flowsheet Documentation  Taken 6/19/2022 1930 by Leo Leslie RN  Discharge to home or other facility with appropriate resources: Identify barriers to discharge with patient and caregiver     Problem: ABCDS Injury Assessment  Goal: Absence of physical injury  Recent Flowsheet Documentation  Taken 6/19/2022 1930 by Leo Leslie RN  Absence of Physical Injury: Implement safety measures based on patient assessment
RN  Outcome: Progressing  6/19/2022 1811 by Margarita Broderick RN  Outcome: Progressing  6/19/2022 0624 by Mariaelena Villeda RN  Outcome: Progressing  Flowsheets (Taken 6/19/2022 0522)  Absence of Physical Injury: Implement safety measures based on patient assessment

## 2022-06-24 NOTE — PROGRESS NOTES
[facility] CARDIOLOGY PROGRESS NOTE           6/24/2022 7:50 AM    Admit Date: 6/18/2022         Subjective: Rec'd sixth dose of tikosyn yesterday evening. ECG pending this AM, QT on last was acceptable. Back in SR today    ROS:  Cardiovascular:  As noted above    Objective:      Vitals:    06/23/22 2013 06/23/22 2019 06/23/22 2357 06/24/22 0442   BP: 120/86 122/83 109/89 (!) 123/91   Pulse: (!) 117 (!) 119 57 77   Resp: 20 20 20   Temp: 98.2 °F (36.8 °C)  98.4 °F (36.9 °C) 98.1 °F (36.7 °C)   TempSrc: Oral  Oral Oral   SpO2: 93%  93% 94%   Weight:    216 lb 6.4 oz (98.2 kg)   Height:           On telemetry:sr      Physical Exam:  General: Well Developed, Well Nourished, No Acute Distress, Alert & Oriented x 3, Appropriate mood  Neck: supple, no JVD  Heart: S1S2 with RRR without murmurs or gallops  Lungs: Clear throughout auscultation bilaterally without adventitious sounds  Abd: soft, nontender, nondistended, with good bowel sounds  Ext: no edema bilaterally  Skin: warm and dry      Data Review:   Recent Labs     06/22/22  0601 06/22/22  0701 06/23/22  0347 06/24/22  0513   NA  --    < > 142 141   K  --    < > 4.1 4.9   MG  --    < > 1.9 2.1   BUN  --    < > 14 14   WBC 8.2  --   --   --    HGB 11.6*  --   --   --    HCT 37.8*  --   --   --      --   --   --     < > = values in this interval not displayed. No results for input(s): TNIPOC in the last 72 hours.     Invalid input(s): TROIQ      Assessment/Plan:     Principal Problem:    Sepsis with acute hypoxic respiratory failure and septic shock (HCC)  Active Problems:    Edema of both lower legs    Atrial fibrillation with rapid ventricular response (HCC)    High cholesterol    Bilateral pneumonia    Essential hypertension, benign    Atherosclerosis of native coronary artery of native heart without angina pectoris    Chronic systolic congestive heart failure (HCC)    LBBB (left bundle branch block)    Chronic renal disease,

## 2022-06-29 ENCOUNTER — TELEPHONE (OUTPATIENT)
Dept: CARDIOLOGY CLINIC | Age: 72
End: 2022-06-29

## 2022-06-29 ENCOUNTER — PATIENT MESSAGE (OUTPATIENT)
Dept: CARDIOLOGY CLINIC | Age: 72
End: 2022-06-29

## 2022-06-29 NOTE — TELEPHONE ENCOUNTER
From: [facility]  To: Dr. Karolyn Allen  Sent: 6/29/2022  4:38 PM EDT  Subject: Swelling in legs    My legs have begun to swell some.

## 2022-06-29 NOTE — TELEPHONE ENCOUNTER
Patient reports swelling more in left than right lower leg from knee down that started yesterday. Denies pain, redness, or warm to touch. Denies CP or SOB. He has been loosing weight. He is taking all medications as prescribed. Swelling resolved with some elevations. Advised watching sodium along with hydrating adequately with water. Elevated legs thru the evening and into the night and call in the morning with an update. Current Outpatient Medications   Medication Instructions    albuterol sulfate HFA (PROVENTIL;VENTOLIN;PROAIR) 108 (90 Base) MCG/ACT inhaler No dose, route, or frequency recorded.     apixaban (ELIQUIS) 5 mg, Oral, 2 TIMES DAILY    aspirin 81 MG EC tablet Oral, DAILY    atorvastatin (LIPITOR) 40 MG tablet Oral, DAILY    azelastine HCl 0.15 % SOLN 1 spray, Nasal, DAILY    dofetilide (TIKOSYN) 250 mcg, Oral, EVERY 12 HOURS SCHEDULED    famotidine (PEPCID) 20 MG tablet TAKE 1 TABLET BY MOUTH ONCE DAILY AT BEDTIME AS NEEDED FOR 90 DAYS    fenofibrate (TRICOR) 145 MG tablet TAKE 1 TABLET BY MOUTH ONCE DAILY FOR 90 DAYS    fluticasone (FLONASE) 50 MCG/ACT nasal spray USE 1 SPRAY(S) IN EACH NOSTRIL TWICE DAILY    lisinopril (PRINIVIL;ZESTRIL) 30 mg, Oral, DAILY    magnesium oxide (MAG-OX) 400 mg, Oral, DAILY    metFORMIN (GLUCOPHAGE) 1,000 mg, Oral, 2 TIMES DAILY WITH MEALS    metoprolol succinate (TOPROL XL) 100 mg, Oral, 2 TIMES DAILY    montelukast (SINGULAIR) 10 MG tablet TAKE 1 TABLET BY MOUTH ONCE DAILY AT BEDTIME FOR ALLERGIES FOR 90 DAYS    sildenafil (REVATIO) 20 mg, Oral, 3 TIMES DAILY

## 2022-06-29 NOTE — TELEPHONE ENCOUNTER
From: [facility]  To: Dr. Aleah Mendez  Sent: 6/29/2022 4:38 PM EDT  Subject: Swelling in legs    My legs have begun to swell some.

## 2022-06-30 NOTE — TELEPHONE ENCOUNTER
Spoke with patient this morning. Patient reports swelling has resolved. Nurse visit scheduled in [facility] this afternoon for evaluation.

## 2022-06-30 NOTE — TELEPHONE ENCOUNTER
Reviewed information with Dr. Dominik Franks. He said its okay to cancel nurse visit and have patient keep us posted. Pt notified of physicians response. I canceled the nurse visit. Questions and concerns addressed. Pt inst to call for additional needs. Pt verb understanding.

## 2022-07-08 RX ORDER — PANTOPRAZOLE SODIUM 40 MG/1
TABLET, DELAYED RELEASE ORAL
Qty: 60 TABLET | OUTPATIENT
Start: 2022-07-08

## 2022-07-22 RX ORDER — DOFETILIDE 0.25 MG/1
250 CAPSULE ORAL EVERY 12 HOURS SCHEDULED
Qty: 180 CAPSULE | Refills: 3 | Status: SHIPPED | OUTPATIENT
Start: 2022-07-22 | End: 2022-07-25 | Stop reason: SDUPTHER

## 2022-07-25 RX ORDER — DOFETILIDE 0.25 MG/1
250 CAPSULE ORAL EVERY 12 HOURS SCHEDULED
Qty: 60 CAPSULE | Refills: 0 | Status: SHIPPED | OUTPATIENT
Start: 2022-07-25

## 2022-08-04 ENCOUNTER — TELEPHONE (OUTPATIENT)
Dept: CARDIOLOGY CLINIC | Age: 72
End: 2022-08-04

## 2022-08-04 NOTE — TELEPHONE ENCOUNTER
Patient having EGD/Colonoscopy TBD at hospital due to [facility] .  Needs to hold Eliquis 2 days prior and 14 days post. . Fax: [phone]

## 2022-08-16 ENCOUNTER — TELEPHONE (OUTPATIENT)
Dept: CARDIOLOGY CLINIC | Age: 72
End: 2022-08-16

## 2022-08-16 NOTE — TELEPHONE ENCOUNTER
----- Message from Radha Vivar MD sent at 8/15/2022 12:05 PM EDT -----  His EF remains low. May need to consider some further work here.  Ensure follow up in the next 1-2 months.   ----- Message -----  From: Tony Crane MD  Sent: 8/11/2022  12:33 PM EDT  To: Radha Vivar MD - Electrolytes: replete K<4, Mg<2  - Diet: regular - advance as tolerated.   - DVT Prophylaxis: hold home Xarelto 20 mg until after EGD  - DISPO: medically stable for discharge. Pt. appealed discharge notice today (7/11)

## 2022-08-16 NOTE — TELEPHONE ENCOUNTER
Spoke with pt made him aware per Dr Inez Gonzalez his EF remains low. May need to consider some further work here. Ensure follow up in the next 1-2 months. Pt verbalized understanding and confirmed he will keep his upcoming appt with Dr Inez Gonzalez on 8/19/22.

## 2022-08-19 ENCOUNTER — OFFICE VISIT (OUTPATIENT)
Dept: CARDIOLOGY CLINIC | Age: 72
End: 2022-08-19
Payer: MEDICARE

## 2022-08-19 VITALS
HEIGHT: 70 IN | WEIGHT: 216.2 LBS | DIASTOLIC BLOOD PRESSURE: 84 MMHG | SYSTOLIC BLOOD PRESSURE: 138 MMHG | BODY MASS INDEX: 30.95 KG/M2 | HEART RATE: 64 BPM

## 2022-08-19 DIAGNOSIS — I48.91 ATRIAL FIBRILLATION WITH RAPID VENTRICULAR RESPONSE (HCC): ICD-10-CM

## 2022-08-19 DIAGNOSIS — I50.23 ACUTE ON CHRONIC SYSTOLIC HEART FAILURE (HCC): Primary | ICD-10-CM

## 2022-08-19 PROCEDURE — 99214 OFFICE O/P EST MOD 30 MIN: CPT | Performed by: INTERNAL MEDICINE

## 2022-08-19 PROCEDURE — 93000 ELECTROCARDIOGRAM COMPLETE: CPT | Performed by: INTERNAL MEDICINE

## 2022-08-19 PROCEDURE — 1036F TOBACCO NON-USER: CPT | Performed by: INTERNAL MEDICINE

## 2022-08-19 PROCEDURE — G8417 CALC BMI ABV UP PARAM F/U: HCPCS | Performed by: INTERNAL MEDICINE

## 2022-08-19 PROCEDURE — 1123F ACP DISCUSS/DSCN MKR DOCD: CPT | Performed by: INTERNAL MEDICINE

## 2022-08-19 PROCEDURE — G8427 DOCREV CUR MEDS BY ELIG CLIN: HCPCS | Performed by: INTERNAL MEDICINE

## 2022-08-19 PROCEDURE — 3017F COLORECTAL CA SCREEN DOC REV: CPT | Performed by: INTERNAL MEDICINE

## 2022-08-19 RX ORDER — OLMESARTAN MEDOXOMIL 20 MG/1
TABLET ORAL
COMMUNITY
Start: 2022-07-11

## 2022-08-19 NOTE — PROGRESS NOTES
[facility] CARDIOLOGY  [address] [place] [address], [address] [facility], [address] [place] [address]  PHONE: [phone]        22      NAME:  Amina Ferguson [facility]  : 1950  MRN: [id number]       Referring Cardiologist: Pee Alonso MD      Reason for Consultation: Atrial fibrillation, persistent       ASSESSMENT and PLAN:   Diagnoses and all orders for this visit:      1. Persistent atrial fibrillation ([facility] [address] s/p AF ablation 2022. Tikosyn induction 2022      2. Essential hypertension, benign      3. Stage 3a chronic kidney disease ([facility] [address]      4. Atherosclerosis of native coronary artery of native heart without angina pectoris      5. LBBB (left bundle branch block), QRS > 150 msec. 6. LV dysfunction, EF 30-35%, by the evidence he has a NICM (nonobstructive CAD by [facility] in ). 9. HTN      80year old male with a history of persistent AF/AFL to whom has undergone an AFL last year and an AF ablation recently. He is now in recurrent AF after his ablation. He was admitted for Tikosyn induction and has been in NSR since. He feels well but EF remains reduced, 30-35%. -AF - s/p for AF ablation. Continue Eliquis and metoprolol.     -Plan for sotalol induction. -HFrEF  - continue GDMT. EF remains reduced 30-35%, may need CRT-D. Will recheck limited echo after  and follow up soon after this. -HTN - controlled. Continue medical therapy.    -Routine cardiac care per Dr. Racquel Leija. -EP follow up in 3 months or PRN. Mr. GRANT [facility] has an upcoming CSPY. He has a moderate CV risk (risk optimized with medical therapy) for a low to moderate risk procedure. He can hold his Eliquis for 2-3 days and restart ASAP/when able post procedure. Patient has been instructed and agrees to call our office with any issues or other concerns related to their cardiac condition(s) and/or complaint(s). Thank you for allowing me to participate in the electrophysiologic care of Mr. Tam Doran.  Please contact me if any questions or concerns were to arise. Sirisha Segundo. Cecilia STILES, MS  Clinical Cardiac Electrophysiology  [facility] Cardiology  08/19/22  9:47 AM    ===================================================================  Chief Complant:    Chief Complaint   Patient presents with    Atrial Fibrillation     10-12 weeks post afib abl    Surgical Clearance        Consultation is requested by Sandro Tijerina MD for evaluation of Atrial Fibrillation (10-12 weeks post afib abl) and Surgical Clearance    History:  Kraig Fonseca is a most pleasant 71 y.o. male with a past medical and cardiac history significant for HTN, LBBB and CAD with previous AFL ablation in 10/2021. He's had a previous flutter ablation. After his flutter ablation, he went into persistent atrial fibrillation. He continues to have persistent AF with NYHA class II-III symptoms. He otherwise is compliant with medical therapy. He comes in for follow up after his AF ablation. He was OOR after his ablation and was admitted for Tikosyn induction. He remains in NSR today but his EF was reduced by a recent echo at 30-35%. The patient otherwise denies presyncope, syncope or lateralizing symptoms. His wife has severe back problems. Cardiac PMH: (Old records have been reviewed and summarized below)    AF ablation 5/17/2022         EKG:  (EKG has been independently visualized by me with interpretation below): NSR, LBBB, QRS > 150 msec. Monitor: 3/2022, persistent AF, 100%. Periods of bradycardia. ECHO: 8/2022  Left Ventricle Moderately reduced left ventricular systolic function with a visually estimated EF of 30 - 35%. Left ventricle is mildly dilated. Mildly increased wall thickness. Moderate global hypokinesis present. Left Atrium Left atrium is moderately dilated. Right Ventricle Right ventricle size is normal. Normal systolic function.    Right Atrium Right atrium size is normal.   Aortic Valve Valve structure is normal. No regurgitation. No stenosis. Mitral Valve Valve structure is normal. Mild regurgitation. No stenosis noted. Tricuspid Valve Valve structure is normal. Mild regurgitation. No stenosis noted. Pulmonic Valve Valve structure is normal. Physiologically normal regurgitation. No stenosis noted. Aorta Normal sized aortic root. Pericardium No pericardial effusion. ECHO: 10/2021    LA: Moderately dilated left atrium. Left Atrium volume index is 39 mL/m2. TV: Mild tricuspid valve regurgitation is present. Right Ventricular Arterial Pressure (RVSP) is 37 mmHg. LV: Estimated LVEF is 45 - 50%. Normal cavity size and wall thickness. Abnormal left ventricular septal motion consistent with left bundle branch block. Moderate (grade 2) left ventricular diastolic dysfunction. MV: Mild mitral annular calcification. Mild to moderate mitral valve regurgitation is present. Previous Heart Catheterization: 1/2020  The central aortic pressure is 140/70 mmHg. Left ventricular end-diastolic pressure is 12 mmHg. There is no gradient on pullback across the aortic valve. The overall left ventricular size is normal.  There is mild generalized left ventricular hypokinesis. Left ventricular ejection fraction on the order of 50%. Coronary angiography reveals the right coronary artery to be normal in its origin. There is mild-to-moderate smooth atherosclerotic disease in the midportion. The greatest stenosis in this region on the order of 40%. The middle and distal portion of the right coronary artery shows some calcification and minor irregularity. The left main is normal.  Divides into LAD and left circumflex. LAD has calcification. In the midportion of the vessel, there is plaque formation with the greatest stenosis in this region on the order of 40-50%. The further middle and distal LAD segments are normal.  The LAD diagonal is a big vessel with minor disease. The left circumflex is a large artery. It has mild atherosclerotic disease present. IMPRESSION:  1. Mild left ventricular systolic dysfunction. 2.  Nonobstructive coronary artery disease as described. There is mild-to-moderate nonobstructive disease of the left anterior descending, left circumflex, and right coronary arteries. Stress Test: n/a       DEVICE INTERROGATION: n/a       Past Medical History, Past Surgical History, Family history, Social History, and Medications were all reviewed with the patient today and updated as necessary. Current Outpatient Medications   Medication Sig Dispense Refill    olmesartan (BENICAR) 20 MG tablet       dofetilide (TIKOSYN) 250 MCG capsule Take 1 capsule by mouth in the morning and 1 capsule before bedtime. 60 capsule 0    apixaban (ELIQUIS) 5 MG TABS tablet Take 1 tablet by mouth in the morning and 1 tablet before bedtime. 180 tablet 3    aspirin 81 MG EC tablet Take by mouth daily      atorvastatin (LIPITOR) 40 MG tablet Take by mouth daily      azelastine HCl 0.15 % SOLN 1 spray by Nasal route daily      famotidine (PEPCID) 20 MG tablet TAKE 1 TABLET BY MOUTH ONCE DAILY AT BEDTIME AS NEEDED FOR 90 DAYS      fenofibrate (TRICOR) 145 MG tablet TAKE 1 TABLET BY MOUTH ONCE DAILY FOR 90 DAYS      fluticasone (FLONASE) 50 MCG/ACT nasal spray USE 1 SPRAY(S) IN EACH NOSTRIL TWICE DAILY      magnesium oxide (MAG-OX) 400 (240 Mg) MG tablet Take 400 mg by mouth daily      metFORMIN (GLUCOPHAGE) 1000 MG tablet Take 1,000 mg by mouth 2 times daily (with meals)      metoprolol succinate (TOPROL XL) 100 MG extended release tablet Take 100 mg by mouth 2 times daily      montelukast (SINGULAIR) 10 MG tablet TAKE 1 TABLET BY MOUTH ONCE DAILY AT BEDTIME FOR ALLERGIES FOR 90 DAYS      sildenafil (REVATIO) 20 MG tablet Take 20 mg by mouth 3 times daily       No current facility-administered medications for this visit.      No Known Allergies    Past Medical History:   Diagnosis Date    Atherosclerosis of native patient today. No results found for any visits on 08/19/22.

## 2022-08-24 ENCOUNTER — TELEPHONE (OUTPATIENT)
Dept: CARDIOLOGY CLINIC | Age: 72
End: 2022-08-24

## 2022-08-24 NOTE — LETTER
[address]  [address] [place] [address]  [phone]  _____________________________________      PRE-OP RISK ASSESSMENT    Tayla Bonilla  1950    Lance Chávez is scheduled for upcoming [place]/EAD  With [facility] [place], operated by [facility]. Office is asking to Hold Eliquis 2 days prior. Lance Chávez is considered MODERATE cardiovascular risk for this procedure. He has a moderate CV risk (risk optimized with medical therapy) for a low to moderate risk procedure. He can hold his Eliquis for 2-3 days and restart ASAP/when able post procedure. But should resume: ASAP/when able post procedure. .    Comments:        Thank you,     Dr Kathleen Police            8/25/2022  10:56 AM

## 2022-08-30 RX ORDER — PANTOPRAZOLE SODIUM 40 MG/1
TABLET, DELAYED RELEASE ORAL
Qty: 60 TABLET | Refills: 0 | Status: SHIPPED | OUTPATIENT
Start: 2022-08-30 | End: 2022-10-28

## 2022-09-14 RX ORDER — METOPROLOL SUCCINATE 100 MG/1
100 TABLET, EXTENDED RELEASE ORAL 2 TIMES DAILY
Qty: 180 TABLET | Refills: 3 | Status: SHIPPED | OUTPATIENT
Start: 2022-09-14

## 2022-09-28 RX ORDER — DILTIAZEM HYDROCHLORIDE 120 MG/1
CAPSULE, COATED, EXTENDED RELEASE ORAL
Qty: 180 CAPSULE | Refills: 3 | Status: SHIPPED | OUTPATIENT
Start: 2022-09-28

## 2022-10-28 RX ORDER — PANTOPRAZOLE SODIUM 40 MG/1
TABLET, DELAYED RELEASE ORAL
Qty: 180 TABLET | Refills: 0 | Status: SHIPPED | OUTPATIENT
Start: 2022-10-28

## 2022-11-04 RX ORDER — VITAMIN B COMPLEX
1 CAPSULE ORAL DAILY
COMMUNITY

## 2022-11-04 RX ORDER — HYDROXYZINE 50 MG/1
TABLET, FILM COATED ORAL 3 TIMES DAILY PRN
COMMUNITY
Start: 2022-10-03

## 2022-11-04 NOTE — PROGRESS NOTES
RN called Pt to confirm appointment time off [phone], arrival time 0720, location,  requirement, and instructions for registration at the hospital.  RN left VM message

## 2022-11-04 NOTE — PERIOP NOTE
Patient verified name, , and procedure. Type: 1a; abbreviated assessment per anesthesia guidelines    Labs per anesthesia: SQBS, BMP, Mg, EKG s/h for DOS- patient is on tikosyn    Instructed pt that they will be notified the day before their procedure by the GI Lab for time of arrival if their procedure is [place] and Pre-op for [place] cases. Arrival times should be called by 5 pm. If no phone is received the patient should contact their respective hospital. The GI lab telephone number is [phone] and ES Pre-op is [phone]. Follow diet and prep instructions per office including NPO status. If patient has NOT received instructions from office patient is advised to call surgeon office, verbalizes understanding. Bath or shower the night before and the am of surgery with non-mositurizing soap. No lotions, oils, powders, cologne on skin. No make up, eye make up or jewelry. Wear loose fitting comfortable, clean clothing. Must have adult present in building the entire time . Medications for the day of procedure hydroxyzine if needed, aspirin 81 mg, atorvastatin, metoprolol, diltiazem, pantoprazole. patient to hold eliquis as instructed by surgeon and cardiology. Medication clearance in EHR  Hold tikosyn the morning of the procedure, bring it in the original prescription bottle tot the hospital.       Patient states that he has talked to the doctors and he will take his medications when he gets home. The following discharge instructions reviewed with patient: medication given during procedure may cause drowsiness for several hours, therefore, do not drive or operate machinery for remainder of the day. You may not drink alcohol on the day of your procedure, please resume regular diet and activity unless otherwise directed. You may experience abdominal distention for several hours that is relieved by the passage of gas.  Contact your physician if you have any of the following: fever or chills, severe abdominal pain or excessive amount of bleeding or a large amount when having a bowel movement.  Occasional specks of blood with bowel movement would not be unusual.

## 2022-11-06 ENCOUNTER — ANESTHESIA EVENT (OUTPATIENT)
Dept: ENDOSCOPY | Age: 72
End: 2022-11-06
Payer: MEDICARE

## 2022-11-07 ENCOUNTER — HOSPITAL ENCOUNTER (OUTPATIENT)
Age: 72
Setting detail: OUTPATIENT SURGERY
Discharge: HOME | End: 2022-11-07
Attending: INTERNAL MEDICINE | Admitting: INTERNAL MEDICINE
Payer: MEDICARE

## 2022-11-07 ENCOUNTER — ANESTHESIA (OUTPATIENT)
Dept: ENDOSCOPY | Age: 72
End: 2022-11-07
Payer: MEDICARE

## 2022-11-07 VITALS
BODY MASS INDEX: 32.21 KG/M2 | OXYGEN SATURATION: 93 % | TEMPERATURE: 97.1 F | DIASTOLIC BLOOD PRESSURE: 82 MMHG | RESPIRATION RATE: 20 BRPM | SYSTOLIC BLOOD PRESSURE: 142 MMHG | HEART RATE: 89 BPM | WEIGHT: 225 LBS | HEIGHT: 70 IN

## 2022-11-07 LAB
EKG ATRIAL RATE: 92 BPM
EKG DIAGNOSIS: NORMAL
EKG P AXIS: 67 DEGREES
EKG P-R INTERVAL: 176 MS
EKG Q-T INTERVAL: 420 MS
EKG QRS DURATION: 152 MS
EKG QTC CALCULATION (BAZETT): 519 MS
EKG R AXIS: -35 DEGREES
EKG T AXIS: 110 DEGREES
EKG VENTRICULAR RATE: 92 BPM
GLUCOSE BLD STRIP.AUTO-MCNC: 188 MG/DL (ref 65–100)
POTASSIUM BLD-SCNC: 3.8 MMOL/L (ref 3.5–5.1)
SERVICE CMNT-IMP: ABNORMAL

## 2022-11-07 PROCEDURE — 88305 TISSUE EXAM BY PATHOLOGIST: CPT

## 2022-11-07 PROCEDURE — 84132 ASSAY OF SERUM POTASSIUM: CPT

## 2022-11-07 PROCEDURE — 2500000003 HC RX 250 WO HCPCS

## 2022-11-07 PROCEDURE — 2580000003 HC RX 258

## 2022-11-07 PROCEDURE — 6360000002 HC RX W HCPCS

## 2022-11-07 PROCEDURE — 2709999900 HC NON-CHARGEABLE SUPPLY: Performed by: INTERNAL MEDICINE

## 2022-11-07 PROCEDURE — 3609010600 HC COLONOSCOPY POLYPECTOMY SNARE/COLD BIOPSY: Performed by: INTERNAL MEDICINE

## 2022-11-07 PROCEDURE — 82962 GLUCOSE BLOOD TEST: CPT

## 2022-11-07 PROCEDURE — 3609012400 HC EGD TRANSORAL BIOPSY SINGLE/MULTIPLE: Performed by: INTERNAL MEDICINE

## 2022-11-07 PROCEDURE — 88312 SPECIAL STAINS GROUP 1: CPT

## 2022-11-07 PROCEDURE — 2580000003 HC RX 258: Performed by: ANESTHESIOLOGY

## 2022-11-07 PROCEDURE — 7100000011 HC PHASE II RECOVERY - ADDTL 15 MIN: Performed by: INTERNAL MEDICINE

## 2022-11-07 PROCEDURE — 3700000001 HC ADD 15 MINUTES (ANESTHESIA): Performed by: INTERNAL MEDICINE

## 2022-11-07 PROCEDURE — 7100000010 HC PHASE II RECOVERY - FIRST 15 MIN: Performed by: INTERNAL MEDICINE

## 2022-11-07 PROCEDURE — 3700000000 HC ANESTHESIA ATTENDED CARE: Performed by: INTERNAL MEDICINE

## 2022-11-07 PROCEDURE — 93005 ELECTROCARDIOGRAM TRACING: CPT | Performed by: ANESTHESIOLOGY

## 2022-11-07 RX ORDER — ETOMIDATE 2 MG/ML
INJECTION INTRAVENOUS PRN
Status: DISCONTINUED | OUTPATIENT
Start: 2022-11-07 | End: 2022-11-07 | Stop reason: SDUPTHER

## 2022-11-07 RX ORDER — SODIUM CHLORIDE 0.9 % (FLUSH) 0.9 %
5-40 SYRINGE (ML) INJECTION EVERY 12 HOURS SCHEDULED
Status: DISCONTINUED | OUTPATIENT
Start: 2022-11-07 | End: 2022-11-07 | Stop reason: HOSPADM

## 2022-11-07 RX ORDER — SODIUM CHLORIDE, SODIUM LACTATE, POTASSIUM CHLORIDE, CALCIUM CHLORIDE 600; 310; 30; 20 MG/100ML; MG/100ML; MG/100ML; MG/100ML
INJECTION, SOLUTION INTRAVENOUS CONTINUOUS
Status: DISCONTINUED | OUTPATIENT
Start: 2022-11-07 | End: 2022-11-07 | Stop reason: HOSPADM

## 2022-11-07 RX ORDER — SODIUM CHLORIDE 0.9 % (FLUSH) 0.9 %
5-40 SYRINGE (ML) INJECTION PRN
Status: DISCONTINUED | OUTPATIENT
Start: 2022-11-07 | End: 2022-11-07 | Stop reason: HOSPADM

## 2022-11-07 RX ORDER — PROPOFOL 10 MG/ML
INJECTION, EMULSION INTRAVENOUS PRN
Status: DISCONTINUED | OUTPATIENT
Start: 2022-11-07 | End: 2022-11-07 | Stop reason: SDUPTHER

## 2022-11-07 RX ORDER — LIDOCAINE HYDROCHLORIDE 10 MG/ML
1 INJECTION, SOLUTION INFILTRATION; PERINEURAL
Status: DISCONTINUED | OUTPATIENT
Start: 2022-11-07 | End: 2022-11-07 | Stop reason: HOSPADM

## 2022-11-07 RX ORDER — SODIUM CHLORIDE 9 MG/ML
INJECTION, SOLUTION INTRAVENOUS PRN
Status: DISCONTINUED | OUTPATIENT
Start: 2022-11-07 | End: 2022-11-07 | Stop reason: HOSPADM

## 2022-11-07 RX ORDER — PROPOFOL 10 MG/ML
INJECTION, EMULSION INTRAVENOUS CONTINUOUS PRN
Status: DISCONTINUED | OUTPATIENT
Start: 2022-11-07 | End: 2022-11-07 | Stop reason: SDUPTHER

## 2022-11-07 RX ADMIN — PHENYLEPHRINE HYDROCHLORIDE 100 MCG: 10 INJECTION INTRAVENOUS at 08:57

## 2022-11-07 RX ADMIN — SODIUM CHLORIDE, POTASSIUM CHLORIDE, SODIUM LACTATE AND CALCIUM CHLORIDE: 600; 310; 30; 20 INJECTION, SOLUTION INTRAVENOUS at 08:11

## 2022-11-07 RX ADMIN — PROPOFOL 50 MG: 10 INJECTION, EMULSION INTRAVENOUS at 08:50

## 2022-11-07 RX ADMIN — PROPOFOL 140 MCG/KG/MIN: 10 INJECTION, EMULSION INTRAVENOUS at 08:50

## 2022-11-07 RX ADMIN — PROPOFOL 30 MG: 10 INJECTION, EMULSION INTRAVENOUS at 08:45

## 2022-11-07 RX ADMIN — PROPOFOL 50 MG: 10 INJECTION, EMULSION INTRAVENOUS at 08:42

## 2022-11-07 RX ADMIN — ETOMIDATE 5 MG: 2 INJECTION, SOLUTION INTRAVENOUS at 08:42

## 2022-11-07 ASSESSMENT — PAIN - FUNCTIONAL ASSESSMENT: PAIN_FUNCTIONAL_ASSESSMENT: NONE - DENIES PAIN

## 2022-11-07 ASSESSMENT — ENCOUNTER SYMPTOMS: SHORTNESS OF BREATH: 1

## 2022-11-07 NOTE — ANESTHESIA POSTPROCEDURE EVALUATION
Department of Anesthesiology  Postprocedure Note    Patient: Mayelin Puckett  MRN: [id number]  YOB: 1950  Date of evaluation: 11/7/2022      Procedure Summary     Date: 11/07/22 Room / Location: [facility] ENDO 04 / [facility] ENDOSCOPY    Anesthesia Start: 4222 Anesthesia Stop: 1444    Procedures:       EGD BIOPSY (Upper GI Region)      COLONOSCOPY POLYPECTOMY SNARE/COLD BIOPSY (Lower GI Region) Diagnosis:       Special screening for malignant neoplasm of colon      (Special screening for malignant neoplasm of colon [Z12.11])    Surgeons: Liu Chacon MD Responsible Provider: Mariam Evans MD    Anesthesia Type: TIVA ASA Status: 4          Anesthesia Type: No value filed. Jama Phase I: Jama Score: 10    Jama Phase II: Jama Score: 10      Anesthesia Post Evaluation    Patient location during evaluation: PACU  Patient participation: complete - patient participated  Level of consciousness: awake and alert  Airway patency: patent  Nausea: well controlled. Complications: no  Cardiovascular status: acceptable.   Respiratory status: acceptable  Hydration status: stable

## 2022-11-07 NOTE — ANESTHESIA PRE PROCEDURE
Department of Anesthesiology  Preprocedure Note       Name:  Leandro Child   Age:  71 y.o.  :  1950                                          MRN:  [id number]         Date:  2022      Surgeon: Solange Lehman):  Henri Weaver MD    Procedure: Procedure(s):  EGD ESOPHAGOGASTRODUODENOSCOPY  COLORECTAL CANCER SCREENING, NOT HIGH RISK/BMI 31    Medications prior to admission:   Prior to Admission medications    Medication Sig Start Date End Date Taking? Authorizing Provider   b complex vitamins capsule Take 1 capsule by mouth daily   Yes Historical Provider, MD   hydrOXYzine HCl (ATARAX) 50 MG tablet 3 times daily as needed 10/3/22   Historical Provider, MD   pantoprazole (PROTONIX) 40 MG tablet TAKE 1 TABLET TWICE DAILY 10/28/22   Huber Holland MD   dilTIAZem (CARDIZEM CD) 120 MG extended release capsule TAKE 1 CAPSULE EVERY 12 HOURS 22   Huber Holland MD   metoprolol succinate (TOPROL XL) 100 MG extended release tablet Take 1 tablet by mouth 2 times daily 22   Rich Fitzpatrick MD   olmesartan (BENICAR) 20 MG tablet  22   Historical Provider, MD   dofetilide (TIKOSYN) 250 MCG capsule Take 1 capsule by mouth in the morning and 1 capsule before bedtime. 22   Huber Holland MD   apixaban (ELIQUIS) 5 MG TABS tablet Take 1 tablet by mouth in the morning and 1 tablet before bedtime.  22   Amanda Kowalski MD   aspirin 81 MG EC tablet Take by mouth daily    Ar Automatic Reconciliation   atorvastatin (LIPITOR) 40 MG tablet Take by mouth daily    Ar Automatic Reconciliation   azelastine HCl 0.15 % SOLN 1 spray by Nasal route daily    Ar Automatic Reconciliation   famotidine (PEPCID) 20 MG tablet TAKE 1 TABLET BY MOUTH ONCE DAILY AT BEDTIME AS NEEDED FOR 90 DAYS 21   Ar Automatic Reconciliation   fenofibrate (TRICOR) 145 MG tablet TAKE 1 TABLET BY MOUTH ONCE DAILY FOR 90 DAYS 21   Ar Automatic Reconciliation   fluticasone (FLONASE) 50 MCG/ACT nasal spray USE 1 SPRAY(S) IN EACH NOSTRIL TWICE DAILY 8/5/21   Ar Automatic Reconciliation   magnesium oxide (MAG-OX) 400 (240 Mg) MG tablet Take 400 mg by mouth daily    Ar Automatic Reconciliation   metFORMIN (GLUCOPHAGE) 1000 MG tablet Take 1,000 mg by mouth 2 times daily (with meals)    Ar Automatic Reconciliation   montelukast (SINGULAIR) 10 MG tablet TAKE 1 TABLET BY MOUTH ONCE DAILY AT BEDTIME FOR ALLERGIES FOR 90 DAYS 8/5/21   Ar Automatic Reconciliation   sildenafil (REVATIO) 20 MG tablet Take 20 mg by mouth 3 times daily  Patient not taking: Reported on 11/4/2022 7/6/21   Ar Automatic Reconciliation       Current medications:    No current facility-administered medications for this encounter.        Allergies:  No Known Allergies    Problem List:    Patient Active Problem List   Diagnosis Code    Essential hypertension, benign I10    Atherosclerosis of native coronary artery of native heart without angina pectoris I25.10    Chronic systolic congestive heart failure (HCC) I50.22    Abnormal cardiovascular stress test R94.39    Atrial flutter (HCC) I48.92    LBBB (left bundle branch block) I44.7    Chronic renal disease, stage III (HCC) N18.30    Paroxysmal atrial fibrillation (HCC) I48.0    Sepsis with acute hypoxic respiratory failure and septic shock ([facility]) A41.9, R65.21, J96.01    Edema of both lower legs R60.0    Atrial fibrillation with rapid ventricular response (HCC) I48.91    High cholesterol E78.00    Bilateral pneumonia J18.9       Past Medical History:        Diagnosis Date    Atherosclerosis of native coronary artery of native heart without angina pectoris 02/17/2020    Atrial fibrillation ([facility] [address]     eliquis, tikosyn    Chronic renal disease, stage III ([facility] [address]     Diabetes mellitus ([facility] [address]     metformin, 's, denies hypoglycemia    GERD (gastroesophageal reflux disease)     medication    High cholesterol     Hypertension     medication    LBBB (left bundle branch block) 01/28/2020    Sepsis ([facility] [address] 06/18/2022    bilateral pneumonia, atrial fib RVR       Past Surgical History:        Procedure Laterality Date    CARDIAC CATHETERIZATION  2020    mild to moderated nonobstructive CAD    CARDIAC SURGERY  05/12/2022    atrial fib ablation    COLONOSCOPY      HEENT      left tube placement    ORTHOPEDIC SURGERY      L5 disc surgery    OTHER SURGICAL HISTORY      skin grafts on right hip and thigh       Social History:    Social History     Tobacco Use    Smoking status: Never    Smokeless tobacco: Never   Substance Use Topics    Alcohol use: No                                Counseling given: Not Answered      Vital Signs (Current):   Vitals:    11/04/22 1047   Weight: 225 lb (102.1 kg)   Height: 5' 10\" (1.778 m)                                              BP Readings from Last 3 Encounters:   08/19/22 138/84   08/11/22 (!) 142/90   06/24/22 (!) 132/98       NPO Status:                                                                                 BMI:   Wt Readings from Last 3 Encounters:   11/04/22 225 lb (102.1 kg)   08/19/22 216 lb 3.2 oz (98.1 kg)   08/11/22 216 lb (98 kg)     Body mass index is 32.28 kg/m².     CBC:   Lab Results   Component Value Date/Time    WBC 8.2 06/22/2022 06:01 AM    RBC 4.07 06/22/2022 06:01 AM    HGB 11.6 06/22/2022 06:01 AM    HCT 37.8 06/22/2022 06:01 AM    MCV 92.9 06/22/2022 06:01 AM    RDW 14.2 06/22/2022 06:01 AM     06/22/2022 06:01 AM       CMP:   Lab Results   Component Value Date/Time     06/24/2022 05:13 AM    K 4.9 06/24/2022 05:13 AM     06/24/2022 05:13 AM    CO2 31 06/24/2022 05:13 AM    BUN 14 06/24/2022 05:13 AM    CREATININE 1.20 06/24/2022 05:13 AM    GFRAA >60 06/24/2022 05:13 AM    AGRATIO 1.2 10/11/2021 07:40 AM    LABGLOM >60 06/24/2022 05:13 AM    GLUCOSE 149 06/24/2022 05:13 AM    PROT 6.1 06/18/2022 11:20 PM    CALCIUM 8.8 06/24/2022 05:13 AM    BILITOT 0.6 06/18/2022 11:20 PM    ALKPHOS 56 06/18/2022 11:20 PM    ALKPHOS 42 10/11/2021 07:40 AM    AST 42 06/18/2022 11:20 PM    ALT 70 06/18/2022 11:20 PM       POC Tests: No results for input(s): POCGLU, POCNA, POCK, POCCL, POCBUN, POCHEMO, POCHCT in the last 72 hours. Coags:   Lab Results   Component Value Date/Time    PROTIME 14.3 05/12/2022 12:41 PM    INR 1.1 05/12/2022 12:41 PM       HCG (If Applicable): No results found for: PREGTESTUR, PREGSERUM, HCG, HCGQUANT     ABGs: No results found for: PHART, PO2ART, TUG0ISD, TLA4OEO, BEART, H3TNIOPQ     Type & Screen (If Applicable):  No results found for: LABABO, LABRH    Drug/Infectious Status (If Applicable):  No results found for: HIV, HEPCAB    COVID-19 Screening (If Applicable):   Lab Results   Component Value Date/Time    COVID19 Not detected 06/19/2022 01:11 AM    COVID19 Performed 10/07/2021 01:36 PM    COVID19 Not Detected 10/07/2021 01:36 PM           Anesthesia Evaluation  Patient summary reviewed and Nursing notes reviewed no history of anesthetic complications:   Airway: Mallampati: II  TM distance: >3 FB   Neck ROM: full  Mouth opening: > = 3 FB   Dental: normal exam         Pulmonary: breath sounds clear to auscultation  (+) shortness of breath: chronic,                             Cardiovascular:  Exercise tolerance: good (>4 METS), Denied chest pain, SOB, syncope   (+) hypertension:, CAD:, dysrhythmias (Afib - eliquis held. On Tikosyn. LBBB): atrial fibrillation and atrial flutter, CHF (EF 04-95%): systolic, hyperlipidemia    (-)  JVD and peripheral edema      Rhythm: regular  Rate: normal                 ROS comment: Echo 8/2022 - EF 30-35%, mild MR    PE comment: SR with LBBB on EKG today   Neuro/Psych:   Negative Neuro/Psych ROS              GI/Hepatic/Renal:   (+) GERD: well controlled, renal disease: CRI,           Endo/Other:    (+) DiabetesType II DM, , : arthritis:., .                 Abdominal:             Vascular: negative vascular ROS.          Other Findings:           Anesthesia Plan      TIVA     ASA 4 Induction: intravenous. Anesthetic plan and risks discussed with patient.                         Castro Whiteside MD   11/7/2022

## 2022-11-07 NOTE — OP NOTE
COLONOSCOPY    DATE of PROCEDURE: 11/7/2022    INDICATIONS:  1. Personal history of colon polyps  2. Colon cancer screening    MEDICATION:  MAC      INSTRUMENT: PCF    PROCEDURE: After obtaining informed consent, the patient was placed in the left lateral position and sedated. The endoscope was advanced to the cecum where the appendiceal orifice and ileocecal valve were identified. On withdrawal, the colon was carefully visualized in a 360 degree fashion. Retroflexion was performed in the rectum. The patient was taken to the recovery area in stable condition. FINDINGS:  Anus: external hemorrhoids  Rectum: internal hemorrhoids  Sigmoid: Diverticulosis present  Descending Colon:  4 mm sessile polyp removed by cold snare polypectomy. Diverticulosis present. Transverse Colon:   4 mm sessile polyp removed by cold snare polypectomy. 3 and 3 mm sessile polyps removed by cold biopsy polypectomy. Ascending Colon:   4and 3 mm sessile polyps removed by cold biopsy polypectomy. Retroflexion performed with good visualization behind the colonic folds. Cecum: normal  Terminal ileum: normal    Estimated blood loss: 0-minimal     ASSESSMENT/PLAN:  1.  Colonoscopy 3 years    Roslyn Loza MD  [facility] [place]

## 2022-11-07 NOTE — DISCHARGE INSTRUCTIONS
Gastrointestinal Esophagogastroduodenoscopy (EGD) - Upper Exam Discharge Instructions    1. Call Dr. Marcus Watkins at [phone] for any problems or questions. 2. Contact the doctor's office for follow up appointment as directed. 3. Medication may cause drowsiness for several hours, therefore:  Do not drive or operate machinery for remainder of the day. No alcohol today. Do not make any important or legal decisions for 24 hours. Do not sign any legal documents for 24 hours. 5. Ordinarily, you may resume regular diet and activity after exam unless otherwise specified by your physician. 6. For mild soreness in your throat you may use Cepacol throat lozenges or warm salt-water gargles as needed. Gastrointestinal Colonoscopy/Flexible Sigmoidoscopy - Lower Exam Discharge Instructions    Medication may cause drowsiness for several hours, therefore, do not drive or operate machinery for remainder of the day. No alcohol today. Ordinarily, you may resume regular diet and activity after exam unless otherwise specified by your physician. Because of air put into your colon during exam, you may experience some abdominal distension, relieved by the passage of gas, for several hours. Contact your physician if you have any of the following:  Excessive amount of bleeding - large amount when having a bowel movement. Occasional specks of blood with bowel movement would not be unusual.  Severe abdominal pain  Fever or Chills  Polyp Removal - follow these additional instructions  Clear liquid diet for the next meal (jell-o, broth, clear drinks)  Soft diet for 24 hours, then resume regular diet   Take Metamucil - 1 tablespoon in juice every morning for 3 days  No Aspirin, Advil, Aleve, Nuprin, Ibuprofen, or medications that contain these drugs for 2 weeks.

## 2022-11-07 NOTE — H&P
[facility] H&P (Admission)        Date:  11/7/2022    Chief Complaint:  GERD, CCS    Subjective:     History of Present Illness:  Patient is a 71 y.o. being admitted for EGD/colon. PMH:  Past Medical History:   Diagnosis Date    Atherosclerosis of native coronary artery of native heart without angina pectoris 02/17/2020    Atrial fibrillation (HCC)     eliquis, tikosyn    Chronic renal disease, stage III (HCC)     Diabetes mellitus ([facility] [address]     metformin, 's, denies hypoglycemia    GERD (gastroesophageal reflux disease)     medication    High cholesterol     Hypertension     medication    LBBB (left bundle branch block) 01/28/2020    Sepsis ([facility] [address] 06/18/2022    bilateral pneumonia, atrial fib RVR       PSH:  Past Surgical History:   Procedure Laterality Date    CARDIAC CATHETERIZATION  2020    mild to moderated nonobstructive CAD    CARDIAC SURGERY  05/12/2022    atrial fib ablation    COLONOSCOPY      HEENT      left tube placement    ORTHOPEDIC SURGERY      L5 disc surgery    OTHER SURGICAL HISTORY      skin grafts on right hip and thigh       Allergies:  No Known Allergies    Home Medications:  Prior to Admission medications    Medication Sig Start Date End Date Taking?  Authorizing Provider   b complex vitamins capsule Take 1 capsule by mouth daily   Yes Historical Provider, MD   hydrOXYzine HCl (ATARAX) 50 MG tablet 3 times daily as needed 10/3/22   Historical Provider, MD   pantoprazole (PROTONIX) 40 MG tablet TAKE 1 TABLET TWICE DAILY 10/28/22   Heidy Galo MD   dilTIAZem (CARDIZEM CD) 120 MG extended release capsule TAKE 1 CAPSULE EVERY 12 HOURS 9/28/22   Heidy Galo MD   metoprolol succinate (TOPROL XL) 100 MG extended release tablet Take 1 tablet by mouth 2 times daily 9/14/22   Lilo Scruggs MD   olmesartan (BENICAR) 20 MG tablet  7/11/22   Historical Provider, MD   dofetilide (TIKOSYN) 250 MCG capsule Take 1 capsule by mouth in the morning and 1 capsule before bedtime. 7/25/22   Jean-Pierre Dennis MD   apixaban (ELIQUIS) 5 MG TABS tablet Take 1 tablet by mouth in the morning and 1 tablet before bedtime.  7/21/22   Zee Oro MD   aspirin 81 MG EC tablet Take by mouth daily    Ar Automatic Reconciliation   atorvastatin (LIPITOR) 40 MG tablet Take by mouth daily    Ar Automatic Reconciliation   azelastine HCl 0.15 % SOLN 1 spray by Nasal route daily    Ar Automatic Reconciliation   famotidine (PEPCID) 20 MG tablet TAKE 1 TABLET BY MOUTH ONCE DAILY AT BEDTIME AS NEEDED FOR 90 DAYS 7/6/21   Ar Automatic Reconciliation   fenofibrate (TRICOR) 145 MG tablet TAKE 1 TABLET BY MOUTH ONCE DAILY FOR 90 DAYS 7/8/21   Ar Automatic Reconciliation   fluticasone (FLONASE) 50 MCG/ACT nasal spray USE 1 SPRAY(S) IN EACH NOSTRIL TWICE DAILY 8/5/21   Ar Automatic Reconciliation   magnesium oxide (MAG-OX) 400 (240 Mg) MG tablet Take 400 mg by mouth daily    Ar Automatic Reconciliation   metFORMIN (GLUCOPHAGE) 1000 MG tablet Take 1,000 mg by mouth 2 times daily (with meals)    Ar Automatic Reconciliation   montelukast (SINGULAIR) 10 MG tablet TAKE 1 TABLET BY MOUTH ONCE DAILY AT BEDTIME FOR ALLERGIES FOR 90 DAYS 8/5/21   Ar Automatic Reconciliation   sildenafil (REVATIO) 20 MG tablet Take 20 mg by mouth 3 times daily  Patient not taking: Reported on 11/4/2022 7/6/21   Ar Automatic Reconciliation       Hospital Medications:  Current Facility-Administered Medications   Medication Dose Route Frequency    lidocaine 1 % injection 1 mL  1 mL IntraDERmal Once PRN    lactated ringers infusion   IntraVENous Continuous    sodium chloride flush 0.9 % injection 5-40 mL  5-40 mL IntraVENous 2 times per day    sodium chloride flush 0.9 % injection 5-40 mL  5-40 mL IntraVENous PRN    0.9 % sodium chloride infusion   IntraVENous PRN       Social History:  Social History     Tobacco Use    Smoking status: Never    Smokeless tobacco: Never   Substance Use Topics    Alcohol use: No         Family History:  Family History   Problem Relation Age of Onset    Other Neg Hx     Post-op Cognitive Dysfunction Neg Hx     Emergence Delirium Neg Hx     Post-op Nausea/Vomiting Neg Hx     Delayed Awakening Neg Hx     Pseudochol. Deficiency Neg Hx     Malig Hypertherm Neg Hx        Review of Systems:  A detailed 10 system ROS is obtained, with pertinent positives as listed above. All others are negative. Objective:     Physical Exam:  Vitals:  BP (!) 159/94   Pulse 92   Temp 98.3 °F (36.8 °C) (Oral)   Resp 16   Ht 5' 10\" (1.778 m)   Wt 225 lb (102.1 kg)   SpO2 93%   BMI 32.28 kg/m²   Gen:  Pt is alert, cooperative, no acute distress  Skin: no large lesions  HEENT: MMM  Cardiovascular: Regular rate and rhythm. No murmurs, gallops, or rubs. Respiratory:  Comfortable breathing with no accessory muscle use. Clear breath sounds with no wheezes, rales, or rhonchi. GI:  Abdomen nondistended, soft, and nontender. Normal active bowel sounds. Neurological:  Gross memory appears intact. Patient is alert and oriented. Psychiatric:  Mood appears appropriate with judgement intact. Laboratory:    No results for input(s): WBC, HGB, HCT, PLT, MCV, NA, K, CL, CO2, BUN, CREA, CA, MG, GLU, ALB, TP, AML, INR, APTT in the last 72 hours. Invalid input(s): AP, SGOT, GPT, TBIL, DBIL, CBIL, LPSE, PTP    Assessment:       Active Problems:    * No active hospital problems. *  Resolved Problems:    * No resolved hospital problems. *      Plan:     Endoscopy and risks explained to the patient. Risks including reaction to sedation, cardiopulmonary events, infection, bleeding, perforation, requirement for surgery if complications should occur, death were explained to patient who expressed understanding and agreed to proceed with endoscopy.         Marques Schmidt MD  [facility], [place]

## 2022-11-07 NOTE — PROGRESS NOTES
0945-Discharge instructions were reviewed with patient and pt spouse, Vanessa Alvarez. An opportunity was given for questions. Patient and spouse verbalized understanding, and has no questions at this time. 1006-To lobby via wheelchair accompanied by staff. Discharged to home in good condition via private vehicle.

## 2022-11-07 NOTE — OP NOTE
Esophagogastroduodenoscopy    DATE of PROCEDURE: 11/7/2022    INDICATIONS:  1. Chronic heartburn    MEDICATIONS ADMINISTERED: MAC    INSTRUMENT: GIF    PROCEDURE:  After obtaining informed consent, the patient was placed in the left lateral position and sedated. The endoscope was advanced under direct vision without difficulty. The esophagus, stomach (including retroflexed views) and duodenum were evaluated. The patient was taken to the recovery area in stable condition. FINDINGS:  ESOPHAGUS:  irregular Z-line. Biopsies taken. Otherwise normal esophagus. STOMACH:   Normal.  Random biopsies taken of the antrum, incisura and body of the stomach. DUODENUM:   Normal    Estimated blood loss: 0-minimal     PLAN:  1.   Proceed with colonoscopy    Yuliet Heard MD  [facility], [place]

## 2022-11-17 ENCOUNTER — NURSE ONLY (OUTPATIENT)
Dept: CARDIOLOGY CLINIC | Age: 72
End: 2022-11-17

## 2022-11-17 DIAGNOSIS — Z98.890 S/P ABLATION OF ATRIAL FIBRILLATION: Primary | ICD-10-CM

## 2022-11-17 DIAGNOSIS — Z86.79 S/P ABLATION OF ATRIAL FIBRILLATION: Primary | ICD-10-CM

## 2022-11-22 ENCOUNTER — OFFICE VISIT (OUTPATIENT)
Dept: CARDIOLOGY CLINIC | Age: 72
End: 2022-11-22
Payer: MEDICARE

## 2022-11-22 VITALS
DIASTOLIC BLOOD PRESSURE: 82 MMHG | SYSTOLIC BLOOD PRESSURE: 136 MMHG | HEART RATE: 66 BPM | HEIGHT: 70 IN | WEIGHT: 225 LBS | BODY MASS INDEX: 32.21 KG/M2

## 2022-11-22 DIAGNOSIS — I48.91 ATRIAL FIBRILLATION WITH RAPID VENTRICULAR RESPONSE (HCC): Primary | ICD-10-CM

## 2022-11-22 PROCEDURE — 1123F ACP DISCUSS/DSCN MKR DOCD: CPT | Performed by: INTERNAL MEDICINE

## 2022-11-22 PROCEDURE — G8417 CALC BMI ABV UP PARAM F/U: HCPCS | Performed by: INTERNAL MEDICINE

## 2022-11-22 PROCEDURE — 3078F DIAST BP <80 MM HG: CPT | Performed by: INTERNAL MEDICINE

## 2022-11-22 PROCEDURE — 3017F COLORECTAL CA SCREEN DOC REV: CPT | Performed by: INTERNAL MEDICINE

## 2022-11-22 PROCEDURE — 99214 OFFICE O/P EST MOD 30 MIN: CPT | Performed by: INTERNAL MEDICINE

## 2022-11-22 PROCEDURE — 1036F TOBACCO NON-USER: CPT | Performed by: INTERNAL MEDICINE

## 2022-11-22 PROCEDURE — 3074F SYST BP LT 130 MM HG: CPT | Performed by: INTERNAL MEDICINE

## 2022-11-22 PROCEDURE — G8484 FLU IMMUNIZE NO ADMIN: HCPCS | Performed by: INTERNAL MEDICINE

## 2022-11-22 PROCEDURE — 93000 ELECTROCARDIOGRAM COMPLETE: CPT | Performed by: INTERNAL MEDICINE

## 2022-11-22 PROCEDURE — G8428 CUR MEDS NOT DOCUMENT: HCPCS | Performed by: INTERNAL MEDICINE

## 2022-11-22 ASSESSMENT — ENCOUNTER SYMPTOMS
RESPIRATORY NEGATIVE: 1
EYES NEGATIVE: 1
ALLERGIC/IMMUNOLOGIC NEGATIVE: 1
GASTROINTESTINAL NEGATIVE: 1

## 2022-11-22 NOTE — PROGRESS NOTES
[facility] CARDIOLOGY  [address] [place] [address], [address] [facility], [address] [place] [address]  PHONE: [phone]        22      [facility]  [facility] 1950  MRN: [id number]       Referring Cardiologist: Angeline Krabbe, MD      Reason for Consultation: Atrial fibrillation, persistent       ASSESSMENT and PLAN:   Diagnoses and all orders for this visit:      1. Persistent atrial fibrillation ([facility] [address] s/p AF ablation 2022. Tikosyn induction 2022      2. Essential hypertension, benign      3. Stage 3a chronic kidney disease ([facility] [address]      4. Atherosclerosis of native coronary artery of native heart without angina pectoris      5. LBBB (left bundle branch block), QRS > 150 msec. 6. LV dysfunction, EF 30-35%, by the evidence he has a NICM (nonobstructive CAD by [facility] in ). 9. HTN      80year old male with a history of persistent AF/AFL to whom has undergone AF and AFL ablations. He was in recurrent AFL after his AF ablation and he was admitted for Tikosyn induction and has been in NSR since. He feels well but EF remains reduced, 30-35%. -AF - s/p for AF ablation. Continue Eliquis and metoprolol. S/p Tikosyn induction. -HFrEF  - continue GDMT. EF remains reduced 30-35%, may need CRT-D. Needs limited echo, will look to perform today. If EF reduced, plan for CRT-D given wide LBBB. -HTN - controlled. Continue medical therapy.    -Routine cardiac care per Dr. Daly Gaona. -EP follow up in 6 months or PRN. Patient has been instructed and agrees to call our office with any issues or other concerns related to their cardiac condition(s) and/or complaint(s). Thank you for allowing me to participate in the electrophysiologic care of Mr. Mayelin Puckett. Please contact me if any questions or concerns were to arise. Staci Farr.  Cecilia STILES, MS  Clinical Cardiac Electrophysiology  [facility]  22  10:36 AM    ===================================================================  Chief Complant:    Chief Complaint   Patient presents with    Congestive Heart Failure    Irregular Heart Beat        Consultation is requested by Nataliya Garcia MD for evaluation of Congestive Heart Failure and Irregular Heart Beat    History:  Chad Martin is a most pleasant 71 y.o. male with a past medical and cardiac history significant for HTN, LBBB and CAD with previous AFL ablation in 10/2021. He's had a previous flutter ablation. After his flutter ablation, he went into persistent atrial fibrillation. He continues to have persistent AF with NYHA class II-III symptoms. He otherwise is compliant with medical therapy. He comes in for follow up after his AF ablation. He was OOR after his ablation and was admitted for Tikosyn induction. He remains in NSR today but his EF was reduced by a recent echo at 30-35%. Needs repeat echo to decide on ICD. The patient otherwise denies presyncope, syncope or lateralizing symptoms. His wife has severe back problems. Cardiac PMH: (Old records have been reviewed and summarized below)    AF ablation 5/17/2022         EKG:  (EKG has been independently visualized by me with interpretation below): NSR, LBBB, QRS > 150 msec. Monitor: 3/2022, persistent AF, 100%. Periods of bradycardia. ECHO: 8/2022  Left Ventricle Moderately reduced left ventricular systolic function with a visually estimated EF of 30 - 35%. Left ventricle is mildly dilated. Mildly increased wall thickness. Moderate global hypokinesis present. Left Atrium Left atrium is moderately dilated. Right Ventricle Right ventricle size is normal. Normal systolic function. Right Atrium Right atrium size is normal.   Aortic Valve Valve structure is normal. No regurgitation. No stenosis. Mitral Valve Valve structure is normal. Mild regurgitation. No stenosis noted. Tricuspid Valve Valve structure is normal. Mild regurgitation. No stenosis noted.    Pulmonic Valve Valve structure is normal. Physiologically normal regurgitation. No stenosis noted. Aorta Normal sized aortic root. Pericardium No pericardial effusion. ECHO: 10/2021    LA: Moderately dilated left atrium. Left Atrium volume index is 39 mL/m2. TV: Mild tricuspid valve regurgitation is present. Right Ventricular Arterial Pressure (RVSP) is 37 mmHg. LV: Estimated LVEF is 45 - 50%. Normal cavity size and wall thickness. Abnormal left ventricular septal motion consistent with left bundle branch block. Moderate (grade 2) left ventricular diastolic dysfunction. MV: Mild mitral annular calcification. Mild to moderate mitral valve regurgitation is present. Previous Heart Catheterization: 1/2020  The central aortic pressure is 140/70 mmHg. Left ventricular end-diastolic pressure is 12 mmHg. There is no gradient on pullback across the aortic valve. The overall left ventricular size is normal.  There is mild generalized left ventricular hypokinesis. Left ventricular ejection fraction on the order of 50%. Coronary angiography reveals the right coronary artery to be normal in its origin. There is mild-to-moderate smooth atherosclerotic disease in the midportion. The greatest stenosis in this region on the order of 40%. The middle and distal portion of the right coronary artery shows some calcification and minor irregularity. The left main is normal.  Divides into LAD and left circumflex. LAD has calcification. In the midportion of the vessel, there is plaque formation with the greatest stenosis in this region on the order of 40-50%. The further middle and distal LAD segments are normal.  The LAD diagonal is a big vessel with minor disease. The left circumflex is a large artery. It has mild atherosclerotic disease present. IMPRESSION:  1. Mild left ventricular systolic dysfunction. 2.  Nonobstructive coronary artery disease as described.   There is mild-to-moderate nonobstructive disease of the left anterior descending, left circumflex, and right coronary arteries. Stress Test: n/a       DEVICE INTERROGATION: n/a       Past Medical History, Past Surgical History, Family history, Social History, and Medications were all reviewed with the patient today and updated as necessary. Current Outpatient Medications   Medication Sig Dispense Refill    hydrOXYzine HCl (ATARAX) 50 MG tablet 3 times daily as needed      b complex vitamins capsule Take 1 capsule by mouth daily      pantoprazole (PROTONIX) 40 MG tablet TAKE 1 TABLET TWICE DAILY 180 tablet 0    metoprolol succinate (TOPROL XL) 100 MG extended release tablet Take 1 tablet by mouth 2 times daily 180 tablet 3    olmesartan (BENICAR) 20 MG tablet       dofetilide (TIKOSYN) 250 MCG capsule Take 1 capsule by mouth in the morning and 1 capsule before bedtime. 60 capsule 0    apixaban (ELIQUIS) 5 MG TABS tablet Take 1 tablet by mouth in the morning and 1 tablet before bedtime. 180 tablet 3    aspirin 81 MG EC tablet Take by mouth daily      atorvastatin (LIPITOR) 40 MG tablet Take by mouth daily      azelastine HCl 0.15 % SOLN 1 spray by Nasal route daily      famotidine (PEPCID) 20 MG tablet TAKE 1 TABLET BY MOUTH ONCE DAILY AT BEDTIME AS NEEDED FOR 90 DAYS      fenofibrate (TRICOR) 145 MG tablet TAKE 1 TABLET BY MOUTH ONCE DAILY FOR 90 DAYS      fluticasone (FLONASE) 50 MCG/ACT nasal spray USE 1 SPRAY(S) IN EACH NOSTRIL TWICE DAILY      magnesium oxide (MAG-OX) 400 (240 Mg) MG tablet Take 400 mg by mouth daily      metFORMIN (GLUCOPHAGE) 1000 MG tablet Take 1,000 mg by mouth 2 times daily (with meals)      montelukast (SINGULAIR) 10 MG tablet TAKE 1 TABLET BY MOUTH ONCE DAILY AT BEDTIME FOR ALLERGIES FOR 90 DAYS      sildenafil (REVATIO) 20 MG tablet Take 20 mg by mouth 3 times daily       No current facility-administered medications for this visit.      No Known Allergies    Past Medical History:   Diagnosis Date    Atherosclerosis of native coronary artery of native heart without angina pectoris 02/17/2020    Atrial fibrillation (HCC)     eliquis, tikosyn    Chronic renal disease, stage III (HCC)     Diabetes mellitus ([facility] [address]     metformin, 's, denies hypoglycemia    GERD (gastroesophageal reflux disease)     medication    High cholesterol     Hypertension     medication    LBBB (left bundle branch block) 01/28/2020    Sepsis ([facility] [address] 06/18/2022    bilateral pneumonia, atrial fib RVR     Past Surgical History:   Procedure Laterality Date    CARDIAC CATHETERIZATION  2020    mild to moderated nonobstructive CAD    CARDIAC SURGERY  05/12/2022    atrial fib ablation    COLONOSCOPY      COLONOSCOPY N/A 11/7/2022    COLONOSCOPY POLYPECTOMY SNARE/COLD BIOPSY performed by Creighton Cranker, MD at [facility]      left tube placement    ORTHOPEDIC SURGERY      L5 disc surgery    OTHER SURGICAL HISTORY      skin grafts on right hip and thigh    UPPER GASTROINTESTINAL ENDOSCOPY N/A 11/7/2022    EGD BIOPSY performed by Creighton Cranker, MD at [facility] ENDOSCOPY     Family History   Problem Relation Age of Onset    Other Neg Hx     Post-op Cognitive Dysfunction Neg Hx     Emergence Delirium Neg Hx     Post-op Nausea/Vomiting Neg Hx     Delayed Awakening Neg Hx     Pseudochol. Deficiency Neg Hx     Malig Hypertherm Neg Hx      Social History     Tobacco Use    Smoking status: Never    Smokeless tobacco: Never   Substance Use Topics    Alcohol use: No       ROS:  A comprehensive review of systems was performed with the pertinent positives and negatives as noted in the HPI in addition to:  Review of Systems   Constitutional: Negative. HENT: Negative. Eyes: Negative. Respiratory: Negative. Cardiovascular: Negative. Gastrointestinal: Negative. Endocrine: Negative. Genitourinary: Negative. Musculoskeletal: Negative. Skin: Negative. Allergic/Immunologic: Negative. Neurological: Negative. Hematological: Negative. Psychiatric/Behavioral: Negative.

## 2023-03-27 RX ORDER — PANTOPRAZOLE SODIUM 40 MG/1
40 TABLET, DELAYED RELEASE ORAL 2 TIMES DAILY
Qty: 180 TABLET | Refills: 0 | Status: SHIPPED | OUTPATIENT
Start: 2023-03-27

## 2023-03-27 NOTE — TELEPHONE ENCOUNTER
Spoke to pt made him aware this refill request will need to be further maintained by PCP.  Pt verbalized understanding    Requested Prescriptions     Pending Prescriptions Disp Refills    pantoprazole (PROTONIX) 40 MG tablet 180 tablet 0     Sig: Take 1 tablet by mouth 2 times daily

## 2023-03-30 RX ORDER — PANTOPRAZOLE SODIUM 40 MG/1
TABLET, DELAYED RELEASE ORAL
Qty: 180 TABLET | Refills: 0 | OUTPATIENT
Start: 2023-03-30

## 2023-03-30 NOTE — TELEPHONE ENCOUNTER
Spoke with pt made him aware this prescription was sent on 3/27/23 for a 90 day supply only as previously discussed. Pt stated he is going to verify with [facility] prescription was received.

## 2023-05-17 ENCOUNTER — APPOINTMENT (OUTPATIENT)
Dept: GENERAL RADIOLOGY | Age: 73
End: 2023-05-17
Payer: MEDICARE

## 2023-05-17 ENCOUNTER — HOSPITAL ENCOUNTER (EMERGENCY)
Age: 73
Discharge: HOME | End: 2023-05-18
Attending: EMERGENCY MEDICINE
Payer: MEDICARE

## 2023-05-17 DIAGNOSIS — M79.605 PAIN OF LEFT LOWER EXTREMITY: Primary | ICD-10-CM

## 2023-05-17 PROCEDURE — 73552 X-RAY EXAM OF FEMUR 2/>: CPT

## 2023-05-17 PROCEDURE — 6370000000 HC RX 637 (ALT 250 FOR IP): Performed by: EMERGENCY MEDICINE

## 2023-05-17 PROCEDURE — 96372 THER/PROPH/DIAG INJ SC/IM: CPT

## 2023-05-17 PROCEDURE — 2500000003 HC RX 250 WO HCPCS: Performed by: EMERGENCY MEDICINE

## 2023-05-17 PROCEDURE — 99284 EMERGENCY DEPT VISIT MOD MDM: CPT

## 2023-05-17 RX ORDER — IBUPROFEN 800 MG/1
800 TABLET ORAL EVERY 8 HOURS PRN
Qty: 21 TABLET | Refills: 0 | Status: SHIPPED | OUTPATIENT
Start: 2023-05-17

## 2023-05-17 RX ORDER — HYDROMORPHONE HYDROCHLORIDE 1 MG/ML
1 INJECTION, SOLUTION INTRAMUSCULAR; INTRAVENOUS; SUBCUTANEOUS
Status: COMPLETED | OUTPATIENT
Start: 2023-05-17 | End: 2023-05-17

## 2023-05-17 RX ORDER — ONDANSETRON 4 MG/1
8 TABLET, ORALLY DISINTEGRATING ORAL
Status: COMPLETED | OUTPATIENT
Start: 2023-05-17 | End: 2023-05-17

## 2023-05-17 RX ORDER — CYCLOBENZAPRINE HCL 10 MG
10 TABLET ORAL
Status: COMPLETED | OUTPATIENT
Start: 2023-05-17 | End: 2023-05-17

## 2023-05-17 RX ORDER — HYDROCODONE BITARTRATE AND ACETAMINOPHEN 10; 325 MG/1; MG/1
1 TABLET ORAL
Status: DISCONTINUED | OUTPATIENT
Start: 2023-05-18 | End: 2023-05-18

## 2023-05-17 RX ORDER — CYCLOBENZAPRINE HCL 10 MG
10 TABLET ORAL 3 TIMES DAILY PRN
Qty: 21 TABLET | Refills: 0 | Status: SHIPPED | OUTPATIENT
Start: 2023-05-17 | End: 2023-05-24

## 2023-05-17 RX ORDER — TRAMADOL HYDROCHLORIDE 50 MG/1
50-100 TABLET ORAL EVERY 8 HOURS PRN
Qty: 12 TABLET | Refills: 0 | Status: SHIPPED | OUTPATIENT
Start: 2023-05-17 | End: 2023-05-20

## 2023-05-17 RX ADMIN — CYCLOBENZAPRINE 10 MG: 10 TABLET, FILM COATED ORAL at 22:54

## 2023-05-17 RX ADMIN — ONDANSETRON 8 MG: 4 TABLET, ORALLY DISINTEGRATING ORAL at 22:54

## 2023-05-17 RX ADMIN — HYDROMORPHONE HYDROCHLORIDE 1 MG: 1 INJECTION, SOLUTION INTRAMUSCULAR; INTRAVENOUS; SUBCUTANEOUS at 22:52

## 2023-05-17 ASSESSMENT — PAIN SCALES - GENERAL
PAINLEVEL_OUTOF10: 9
PAINLEVEL_OUTOF10: 10
PAINLEVEL_OUTOF10: 10

## 2023-05-17 ASSESSMENT — LIFESTYLE VARIABLES
HOW MANY STANDARD DRINKS CONTAINING ALCOHOL DO YOU HAVE ON A TYPICAL DAY: PATIENT DOES NOT DRINK
HOW OFTEN DO YOU HAVE A DRINK CONTAINING ALCOHOL: NEVER

## 2023-05-17 ASSESSMENT — PAIN - FUNCTIONAL ASSESSMENT
PAIN_FUNCTIONAL_ASSESSMENT: 0-10
PAIN_FUNCTIONAL_ASSESSMENT: 0-10

## 2023-05-18 VITALS
BODY MASS INDEX: 33.18 KG/M2 | SYSTOLIC BLOOD PRESSURE: 166 MMHG | RESPIRATION RATE: 17 BRPM | OXYGEN SATURATION: 99 % | TEMPERATURE: 98.4 F | DIASTOLIC BLOOD PRESSURE: 92 MMHG | HEIGHT: 69 IN | HEART RATE: 65 BPM | WEIGHT: 224 LBS

## 2023-05-18 NOTE — ED NOTES
I have reviewed discharge instructions with the patient. The patient verbalized understanding. Patient left ED via Discharge Method: wheelchair to Home with son. Opportunity for questions and clarification provided. Patient given 3 scripts. To continue your aftercare when you leave the hospital, you may receive an automated call from our care team to check in on how you are doing. This is a free service and part of our promise to provide the best care and service to meet your aftercare needs.  If you have questions, or wish to unsubscribe from this service please call [phone]. Thank you for Choosing our [employer] Emergency Department.        Clem Cox RN  05/18/23 8188

## 2023-05-18 NOTE — ED TRIAGE NOTES
Pt arrives to triage c/o L quad pain after stepping off porch and landing on a root around 1900. Pt states pain worsened after trying to walk on leg and cannot put weight on leg. Pt denies hitting head and other injures from fall.

## 2023-05-23 ENCOUNTER — OFFICE VISIT (OUTPATIENT)
Age: 73
End: 2023-05-23
Payer: MEDICARE

## 2023-05-23 VITALS
SYSTOLIC BLOOD PRESSURE: 132 MMHG | HEIGHT: 70 IN | WEIGHT: 233 LBS | BODY MASS INDEX: 33.36 KG/M2 | HEART RATE: 69 BPM | DIASTOLIC BLOOD PRESSURE: 92 MMHG

## 2023-05-23 DIAGNOSIS — I48.91 ATRIAL FIBRILLATION WITH RAPID VENTRICULAR RESPONSE (HCC): Primary | ICD-10-CM

## 2023-05-23 PROCEDURE — 1123F ACP DISCUSS/DSCN MKR DOCD: CPT | Performed by: INTERNAL MEDICINE

## 2023-05-23 PROCEDURE — 1036F TOBACCO NON-USER: CPT | Performed by: INTERNAL MEDICINE

## 2023-05-23 PROCEDURE — G8417 CALC BMI ABV UP PARAM F/U: HCPCS | Performed by: INTERNAL MEDICINE

## 2023-05-23 PROCEDURE — 99214 OFFICE O/P EST MOD 30 MIN: CPT | Performed by: INTERNAL MEDICINE

## 2023-05-23 PROCEDURE — 3075F SYST BP GE 130 - 139MM HG: CPT | Performed by: INTERNAL MEDICINE

## 2023-05-23 PROCEDURE — 93000 ELECTROCARDIOGRAM COMPLETE: CPT | Performed by: INTERNAL MEDICINE

## 2023-05-23 PROCEDURE — 3080F DIAST BP >= 90 MM HG: CPT | Performed by: INTERNAL MEDICINE

## 2023-05-23 PROCEDURE — G8428 CUR MEDS NOT DOCUMENT: HCPCS | Performed by: INTERNAL MEDICINE

## 2023-05-23 PROCEDURE — 3017F COLORECTAL CA SCREEN DOC REV: CPT | Performed by: INTERNAL MEDICINE

## 2023-05-23 RX ORDER — CETIRIZINE HYDROCHLORIDE 10 MG/1
1 TABLET ORAL
COMMUNITY

## 2023-05-23 RX ORDER — DOFETILIDE 0.25 MG/1
250 CAPSULE ORAL EVERY 12 HOURS SCHEDULED
Qty: 180 CAPSULE | Refills: 3 | Status: SHIPPED | OUTPATIENT
Start: 2023-05-23

## 2023-05-23 RX ORDER — GLIPIZIDE 2.5 MG/1
TABLET, EXTENDED RELEASE ORAL
COMMUNITY
Start: 2023-03-31

## 2023-05-23 ASSESSMENT — ENCOUNTER SYMPTOMS
EYES NEGATIVE: 1
GASTROINTESTINAL NEGATIVE: 1
ALLERGIC/IMMUNOLOGIC NEGATIVE: 1
RESPIRATORY NEGATIVE: 1

## 2023-05-23 NOTE — PROGRESS NOTES
[facility] CARDIOLOGY  [address] [facility], [address] [facility], [address] [place] [address]  PHONE: [phone]        23      NAME:  Niru GRANT [facility] E[facility]  : 1950  MRN: [id number]     Referring Cardiologist: Rosalind Glynn MD     Reason for Consultation: Atrial fibrillation, persistent      ASSESSMENT and PLAN:  Diagnoses and all orders for this visit:      1. Persistent atrial fibrillation ([facility] [address] s/p AF ablation 2022. Tikosyn induction 2022      2. Essential hypertension, benign      3. Stage 3a chronic kidney disease ([facility] [address]      4. Atherosclerosis of native coronary artery of native heart without angina pectoris      5. LBBB (left bundle branch block), QRS > 150 msec. 6. LV dysfunction, EF 30-35%, improved 50-55%, by the evidence he has a NICM (nonobstructive CAD by [facility] in ). 9. HTN      71year old male with a history of persistent AF/AFL to whom has undergone AF and AFL ablations. He was in recurrent AFL after his AF ablation and he was admitted for Tikosyn induction and has been in NSR since. He feels well but EF remains reduced, 30-35%. -AF - s/p for AF ablation. Continue Eliquis, metoprolol and Tikosyn induction. -HFrEF  - continue GDMT. EF improved to 50-55%, if pacing indication, favor CRT given wide LBBB. -HTN - controlled. Continue medical therapy. -EP follow up in 6 months or PRN. Patient has been instructed and agrees to call our office with any issues or other concerns related to their cardiac condition(s) and/or complaint(s). Thank you for allowing me to participate in the electrophysiologic care of Mr. Shonda Oneil. Please contact me if any questions or concerns were to arise. Miryam Brooks MD, MS  Clinical Cardiac Electrophysiology  [facility]  23  11:06 AM    ===================================================================  Chief Complant:    Chief Complaint   Patient presents with    Irregular Heart Beat        Consultation is

## 2023-05-28 ASSESSMENT — ENCOUNTER SYMPTOMS
BACK PAIN: 0
COLOR CHANGE: 0

## 2023-07-05 RX ORDER — METOPROLOL SUCCINATE 100 MG/1
TABLET, EXTENDED RELEASE ORAL
Qty: 180 TABLET | Refills: 3 | Status: SHIPPED | OUTPATIENT
Start: 2023-07-05

## 2023-07-05 NOTE — TELEPHONE ENCOUNTER
Requested Prescriptions     Pending Prescriptions Disp Refills    metoprolol succinate (TOPROL XL) 100 MG extended release tablet [Pharmacy Med Name: METOPROLOL SUCCINATE  MG Tablet Extended Release 24 Hour] 180 tablet 3     Sig: TAKE 1 TABLET TWICE DAILY

## 2024-03-25 RX ORDER — DOFETILIDE 0.25 MG/1
CAPSULE ORAL
Qty: 180 CAPSULE | Refills: 3 | Status: SHIPPED | OUTPATIENT
Start: 2024-03-25

## 2025-01-09 RX ORDER — DOFETILIDE 0.25 MG/1
CAPSULE ORAL
Qty: 180 CAPSULE | Refills: 0 | Status: SHIPPED | OUTPATIENT
Start: 2025-01-09

## 2025-01-09 NOTE — TELEPHONE ENCOUNTER
Requested Prescriptions     Pending Prescriptions Disp Refills    dofetilide (TIKOSYN) 250 MCG capsule [Pharmacy Med Name: Dofetilide Oral Capsule 250 MCG] 180 capsule 0     Sig: TAKE 1 CAPSULE EVERY 12 HOURS    Verified rx in last OV date 5/23/23, appt sched 3/11/25. Pharmacy confirmed. Erx as requested

## 2025-03-11 ENCOUNTER — OFFICE VISIT (OUTPATIENT)
Age: 75
End: 2025-03-11
Payer: MEDICARE

## 2025-03-11 VITALS
WEIGHT: 230.6 LBS | DIASTOLIC BLOOD PRESSURE: 74 MMHG | HEART RATE: 70 BPM | SYSTOLIC BLOOD PRESSURE: 118 MMHG | BODY MASS INDEX: 33.01 KG/M2 | HEIGHT: 70 IN

## 2025-03-11 DIAGNOSIS — I48.91 ATRIAL FIBRILLATION WITH RAPID VENTRICULAR RESPONSE (HCC): Primary | ICD-10-CM

## 2025-03-11 PROCEDURE — 3017F COLORECTAL CA SCREEN DOC REV: CPT | Performed by: INTERNAL MEDICINE

## 2025-03-11 PROCEDURE — 3074F SYST BP LT 130 MM HG: CPT | Performed by: INTERNAL MEDICINE

## 2025-03-11 PROCEDURE — G8427 DOCREV CUR MEDS BY ELIG CLIN: HCPCS | Performed by: INTERNAL MEDICINE

## 2025-03-11 PROCEDURE — 1126F AMNT PAIN NOTED NONE PRSNT: CPT | Performed by: INTERNAL MEDICINE

## 2025-03-11 PROCEDURE — 1123F ACP DISCUSS/DSCN MKR DOCD: CPT | Performed by: INTERNAL MEDICINE

## 2025-03-11 PROCEDURE — G8417 CALC BMI ABV UP PARAM F/U: HCPCS | Performed by: INTERNAL MEDICINE

## 2025-03-11 PROCEDURE — 3078F DIAST BP <80 MM HG: CPT | Performed by: INTERNAL MEDICINE

## 2025-03-11 PROCEDURE — 99214 OFFICE O/P EST MOD 30 MIN: CPT | Performed by: INTERNAL MEDICINE

## 2025-03-11 PROCEDURE — 1036F TOBACCO NON-USER: CPT | Performed by: INTERNAL MEDICINE

## 2025-03-11 PROCEDURE — 1159F MED LIST DOCD IN RCRD: CPT | Performed by: INTERNAL MEDICINE

## 2025-03-11 PROCEDURE — 93000 ELECTROCARDIOGRAM COMPLETE: CPT | Performed by: INTERNAL MEDICINE

## 2025-03-11 RX ORDER — DOFETILIDE 0.25 MG/1
250 CAPSULE ORAL 2 TIMES DAILY
Qty: 180 CAPSULE | Refills: 3 | Status: SHIPPED | OUTPATIENT
Start: 2025-03-11

## 2025-03-11 RX ORDER — METOPROLOL SUCCINATE 100 MG/1
100 TABLET, EXTENDED RELEASE ORAL DAILY
Qty: 90 TABLET | Refills: 3 | Status: SHIPPED | OUTPATIENT
Start: 2025-03-11

## 2025-03-11 ASSESSMENT — ENCOUNTER SYMPTOMS
RESPIRATORY NEGATIVE: 1
GASTROINTESTINAL NEGATIVE: 1
ALLERGIC/IMMUNOLOGIC NEGATIVE: 1
EYES NEGATIVE: 1

## 2025-03-11 NOTE — PROGRESS NOTES
[facility] CARDIOLOGY  [address] [place] [address], [address]  [place] [address]  PHONE: [phone]        25      NAME:  Pedrito Bonilla  : 1950  MRN: [id number]     Referring Cardiologist: Oswald Sales MD     Reason for Consultation: Atrial fibrillation, persistent      ASSESSMENT and PLAN:  Diagnoses and all orders for this visit:      1. Persistent atrial fibrillation (HCC) s/p AF ablation 2022. Tikosyn induction 2022      2. Essential hypertension, benign      3. Stage 3a chronic kidney disease (HCC)      4. Atherosclerosis of native coronary artery of native heart without angina pectoris      5. LBBB (left bundle branch block), QRS > 150 msec.       6. LV dysfunction, EF 30-35%, improved 50-55%, by the evidence he has a NICM (nonobstructive CAD by [facility] in ).      7. HTN      72 year old male with a history of persistent AF/AFL to whom has undergone AF and AFL ablations.  He was in recurrent AFL after his AF ablation and he was admitted for Tikosyn induction and has been in NSR since.      -AF - Stable. s/p for AF ablation. Continue Eliquis, metoprolol and Tikosyn induction.    -Reduce metoprolol to 100 mg po daily and can further reduce to 50 mg po daily in follow up if doing well.      -HFrEF  - Stable, continue GDMT. EF improved to 50-55%, previously 30-35%, defining a TICM  -If pacing indication, favor CRT given wide LBBB.     -HTN - Stable, controlled. Continue medical therapy.      -EP follow up in 6 months or PRN.     Patient has been instructed and agrees to call our office with any issues or other concerns related to their cardiac condition(s) and/or complaint(s).    Thank you for allowing me to participate in the electrophysiologic care of Mr. Pedrito Bonilla. Please contact me if any questions or concerns were to arise.    Gregg Brooks MD, MS  Clinical Cardiac Electrophysiology  [facility]  25  4:35

## (undated) DEVICE — GAUZE,SPONGE,4"X4",12PLY,WOVEN,NS,LF: Brand: MEDLINE

## (undated) DEVICE — PATCH CARTO 3 EXT REF --

## (undated) DEVICE — PRESSURE MONITORING SET: Brand: TRUWAVE

## (undated) DEVICE — SNARE POLYP SM W13MMXL240CM SHTH DIA2.4MM OVL FLX DISP

## (undated) DEVICE — NEEDLE SYR 18GA L1.5IN RED PLAS HUB S STL BLNT FILL W/O

## (undated) DEVICE — CATHETER EP 7FR L115CM 2-8-2MM SPC TIP 2MM 10 ELECTRD FJ

## (undated) DEVICE — 18G NG KIT WITH 96IN PROBE COVER (10 PK): Brand: SITE-RITE

## (undated) DEVICE — TUBE SET IRR PUMP THERMALCOOL -- SMARTABLATE

## (undated) DEVICE — CATH EP MAP 2-6-2 7FR D CRV -- PENTARAY

## (undated) DEVICE — SYRINGE, LUER SLIP, STERILE, 60ML: Brand: MEDLINE

## (undated) DEVICE — CONNECTOR TBNG OD5-7MM O2 END DISP

## (undated) DEVICE — SYRINGE MED 10ML LUERLOCK TIP W/O SFTY DISP

## (undated) DEVICE — INTRODUCER SHTH CARDIAGUIDE FCL20101

## (undated) DEVICE — PINNACLE TIF INTRODUCER SHEATH: Brand: PINNACLE

## (undated) DEVICE — KENDALL RADIOLUCENT FOAM MONITORING ELECTRODE RECTANGULAR SHAPE: Brand: KENDALL

## (undated) DEVICE — FORCEPS BX L240CM JAW DIA2.8MM L CAP W/ NDL MIC MESH TOOTH

## (undated) DEVICE — TRAP SPEC RETRV CLR PLAS POLYP IN LN SUCT QUIK CTCH

## (undated) DEVICE — AIRLIFE™ OXYGEN TUBING 7 FEET (2.1 M) CRUSH RESISTANT OXYGEN TUBING, VINYL TIPPED: Brand: AIRLIFE™

## (undated) DEVICE — CATH BIDIR JCRV 8F 3.5X115MM -- THERMOCOOL SF

## (undated) DEVICE — SYSTEM CLOSURE 6-12 FR VEN VASC VASCADE MVP

## (undated) DEVICE — CATHETER US GE COMPATIBILITY SOUNDSTAR ECO 10FR

## (undated) DEVICE — Device: Brand: NRG TRANSSEPTAL NEEDLE

## (undated) DEVICE — CANNULA NSL ORAL AD FOR CAPNOFLEX CO2 O2 AIRLFE

## (undated) DEVICE — BLOCK BITE AD 60FR W/ VELC STRP ADDRESSES MOST PT AND

## (undated) DEVICE — SYRINGE MED 3ML CLR PLAS STD N CTRL LUERLOCK TIP DISP

## (undated) DEVICE — PINNACLE INTRODUCER SHEATH: Brand: PINNACLE

## (undated) DEVICE — SHTH GUID 8.5F 22MM MED CRV -- CARTO VIZIGO

## (undated) DEVICE — CATH DECANAV EP F CURVE 7FR --

## (undated) DEVICE — YANKAUER,BULB TIP,W/O VENT,RIGID,STERILE: Brand: MEDLINE

## (undated) DEVICE — SINGLE PORT MANIFOLD: Brand: NEPTUNE 2

## (undated) DEVICE — CONTAINER FORMALIN PREFILLED 10% NBF 60ML

## (undated) DEVICE — LUBE JELLY FOIL PACK 1.4 OZ: Brand: MEDLINE INDUSTRIES, INC.

## (undated) DEVICE — CATHETER ABLAT 8FR L115CM 1-6-2MM SPC TIP 3.5MM FJ CRV